# Patient Record
Sex: MALE | Race: WHITE | NOT HISPANIC OR LATINO | Employment: FULL TIME | URBAN - METROPOLITAN AREA
[De-identification: names, ages, dates, MRNs, and addresses within clinical notes are randomized per-mention and may not be internally consistent; named-entity substitution may affect disease eponyms.]

---

## 2019-02-05 ENCOUNTER — LAB VISIT (OUTPATIENT)
Dept: LAB | Facility: HOSPITAL | Age: 34
End: 2019-02-05
Attending: FAMILY MEDICINE
Payer: COMMERCIAL

## 2019-02-05 ENCOUNTER — OFFICE VISIT (OUTPATIENT)
Dept: INTERNAL MEDICINE | Facility: CLINIC | Age: 34
End: 2019-02-05
Payer: COMMERCIAL

## 2019-02-05 VITALS
SYSTOLIC BLOOD PRESSURE: 120 MMHG | WEIGHT: 156.31 LBS | HEIGHT: 69 IN | OXYGEN SATURATION: 98 % | BODY MASS INDEX: 23.15 KG/M2 | HEART RATE: 74 BPM | TEMPERATURE: 100 F | DIASTOLIC BLOOD PRESSURE: 76 MMHG

## 2019-02-05 DIAGNOSIS — Z13.6 SCREENING FOR CARDIOVASCULAR CONDITION: ICD-10-CM

## 2019-02-05 DIAGNOSIS — E55.9 VITAMIN D DEFICIENCY: ICD-10-CM

## 2019-02-05 DIAGNOSIS — J11.1 INFLUENZA: ICD-10-CM

## 2019-02-05 DIAGNOSIS — Z00.00 WELLNESS EXAMINATION: ICD-10-CM

## 2019-02-05 DIAGNOSIS — R50.9 FEVER, UNSPECIFIED FEVER CAUSE: ICD-10-CM

## 2019-02-05 DIAGNOSIS — Z00.00 WELLNESS EXAMINATION: Primary | ICD-10-CM

## 2019-02-05 DIAGNOSIS — R05.9 COUGH: ICD-10-CM

## 2019-02-05 DIAGNOSIS — Z82.49 FAMILY HISTORY OF PREMATURE CORONARY HEART DISEASE: ICD-10-CM

## 2019-02-05 LAB
25(OH)D3+25(OH)D2 SERPL-MCNC: 13 NG/ML
ANION GAP SERPL CALC-SCNC: 10 MMOL/L
BASOPHILS # BLD AUTO: 0.02 K/UL
BASOPHILS NFR BLD: 0.3 %
BUN SERPL-MCNC: 15 MG/DL
CALCIUM SERPL-MCNC: 9.8 MG/DL
CHLORIDE SERPL-SCNC: 102 MMOL/L
CHOLEST SERPL-MCNC: 211 MG/DL
CHOLEST/HDLC SERPL: 4.5 {RATIO}
CO2 SERPL-SCNC: 30 MMOL/L
CREAT SERPL-MCNC: 0.8 MG/DL
DIFFERENTIAL METHOD: ABNORMAL
EOSINOPHIL # BLD AUTO: 0.1 K/UL
EOSINOPHIL NFR BLD: 2.1 %
ERYTHROCYTE [DISTWIDTH] IN BLOOD BY AUTOMATED COUNT: 12.4 %
EST. GFR  (AFRICAN AMERICAN): >60 ML/MIN/1.73 M^2
EST. GFR  (NON AFRICAN AMERICAN): >60 ML/MIN/1.73 M^2
GLUCOSE SERPL-MCNC: 88 MG/DL
HCT VFR BLD AUTO: 46 %
HDLC SERPL-MCNC: 47 MG/DL
HDLC SERPL: 22.3 %
HGB BLD-MCNC: 15.2 G/DL
IMM GRANULOCYTES # BLD AUTO: 0.02 K/UL
IMM GRANULOCYTES NFR BLD AUTO: 0.3 %
INFLUENZA A, MOLECULAR: NEGATIVE
INFLUENZA B, MOLECULAR: NEGATIVE
LDLC SERPL CALC-MCNC: 98.6 MG/DL
LYMPHOCYTES # BLD AUTO: 1.7 K/UL
LYMPHOCYTES NFR BLD: 29.4 %
MCH RBC QN AUTO: 31.9 PG
MCHC RBC AUTO-ENTMCNC: 33 G/DL
MCV RBC AUTO: 97 FL
MONOCYTES # BLD AUTO: 0.6 K/UL
MONOCYTES NFR BLD: 10.5 %
NEUTROPHILS # BLD AUTO: 3.3 K/UL
NEUTROPHILS NFR BLD: 57.4 %
NONHDLC SERPL-MCNC: 164 MG/DL
NRBC BLD-RTO: 0 /100 WBC
PLATELET # BLD AUTO: 179 K/UL
PMV BLD AUTO: 11.7 FL
POTASSIUM SERPL-SCNC: 3.9 MMOL/L
RBC # BLD AUTO: 4.76 M/UL
SODIUM SERPL-SCNC: 142 MMOL/L
SPECIMEN SOURCE: NORMAL
TRIGL SERPL-MCNC: 327 MG/DL
TSH SERPL DL<=0.005 MIU/L-ACNC: 1.51 UIU/ML
WBC # BLD AUTO: 5.72 K/UL

## 2019-02-05 PROCEDURE — 3008F PR BODY MASS INDEX (BMI) DOCUMENTED: ICD-10-PCS | Mod: CPTII,S$GLB,, | Performed by: FAMILY MEDICINE

## 2019-02-05 PROCEDURE — 99999 PR PBB SHADOW E&M-NEW PATIENT-LVL IV: ICD-10-PCS | Mod: PBBFAC,,, | Performed by: FAMILY MEDICINE

## 2019-02-05 PROCEDURE — 99999 PR PBB SHADOW E&M-NEW PATIENT-LVL IV: CPT | Mod: PBBFAC,,, | Performed by: FAMILY MEDICINE

## 2019-02-05 PROCEDURE — 84443 ASSAY THYROID STIM HORMONE: CPT

## 2019-02-05 PROCEDURE — 99385 PREV VISIT NEW AGE 18-39: CPT | Mod: 25,1E,GY,S$GLB | Performed by: FAMILY MEDICINE

## 2019-02-05 PROCEDURE — 36415 COLL VENOUS BLD VENIPUNCTURE: CPT

## 2019-02-05 PROCEDURE — 80048 BASIC METABOLIC PNL TOTAL CA: CPT

## 2019-02-05 PROCEDURE — 87502 INFLUENZA DNA AMP PROBE: CPT

## 2019-02-05 PROCEDURE — 99203 PR OFFICE/OUTPT VISIT, NEW, LEVL III, 30-44 MIN: ICD-10-PCS | Mod: 25,S$GLB,, | Performed by: FAMILY MEDICINE

## 2019-02-05 PROCEDURE — 80061 LIPID PANEL: CPT

## 2019-02-05 PROCEDURE — 3008F BODY MASS INDEX DOCD: CPT | Mod: CPTII,S$GLB,, | Performed by: FAMILY MEDICINE

## 2019-02-05 PROCEDURE — 85025 COMPLETE CBC W/AUTO DIFF WBC: CPT

## 2019-02-05 PROCEDURE — 82306 VITAMIN D 25 HYDROXY: CPT

## 2019-02-05 PROCEDURE — 99203 OFFICE O/P NEW LOW 30 MIN: CPT | Mod: 25,S$GLB,, | Performed by: FAMILY MEDICINE

## 2019-02-05 PROCEDURE — 99385 PR PREVENTIVE VISIT,NEW,18-39: ICD-10-PCS | Mod: 25,1E,GY,S$GLB | Performed by: FAMILY MEDICINE

## 2019-02-05 RX ORDER — METHYLPREDNISOLONE 4 MG/1
TABLET ORAL
Qty: 1 PACKAGE | Refills: 0 | Status: SHIPPED | OUTPATIENT
Start: 2019-02-05 | End: 2019-02-05

## 2019-02-05 RX ORDER — METHYLPREDNISOLONE 4 MG/1
TABLET ORAL
Qty: 21 TABLET | Refills: 0 | Status: SHIPPED | OUTPATIENT
Start: 2019-02-05 | End: 2019-02-18 | Stop reason: ALTCHOICE

## 2019-02-05 RX ORDER — OSELTAMIVIR PHOSPHATE 75 MG/1
75 CAPSULE ORAL 2 TIMES DAILY
Qty: 10 CAPSULE | Refills: 0 | Status: SHIPPED | OUTPATIENT
Start: 2019-02-05 | End: 2019-02-10

## 2019-02-05 NOTE — LETTER
February 5,2019             [facility]  Internal Medicine  [address]  [place] [address]  Phone: [phone]  Fax: [phone]   February 5, 2019     Patient: Kaden Heller   YOB: 1985   Date of Visit: 2/5/2019       To Whom it May Concern:    Kaden Heller was seen in my clinic on 2/5/2019 and Dx with Influenza . He can return to work once fever free for 24 hrs .     If you have any questions or concerns, please don't hesitate to call.    Sincerely,         Dr. HARMAN Granda

## 2019-02-05 NOTE — PROGRESS NOTES
Subjective:       Patient ID: Kaden Heller is a 33 y.o. male.    Chief Complaint: Establish Care; Chest Congestion; Otalgia; and Sore Throat    34 yo male here to establish care. He has no major medical problems. Complains today of 1 day history of chest congestion, earache, and sore throat. Has tried OTC medications such as cough and cold remedies without much improvement. C/o low grade fever, cough and wheeze as most bothersome symptom. States symptoms started suddenly.    Also would like health maintenance visit; he has +FH of early MI/CAD in multiple family members. Requests referral to cardiology for eval.  He has surgical h/o appendectomy as well as repair of torn labrum of hip from track and field injury. No recent blood work; unsure of last tetanus but does not want booster; declines flu shot      does not have a problem list on file.  History reviewed. No pertinent past medical history.  Past Surgical History:   Procedure Laterality Date    APPENDECTOMY      HIP ARTHROSCOPY W/ LABRAL REPAIR       Family History   Problem Relation Age of Onset    Heart disease Mother     Heart attack Mother     Asthma Father     No Known Problems Sister     No Known Problems Daughter     No Known Problems Son     Heart attack Maternal Uncle     Heart disease Maternal Uncle     Heart attack Maternal Grandmother     Heart disease Maternal Grandmother     Heart attack Maternal Grandfather     Heart disease Maternal Grandfather      Social History     Socioeconomic History    Marital status: Single     Spouse name: Not on file    Number of children: Not on file    Years of education: Not on file    Highest education level: Not on file   Social Needs    Financial resource strain: Not on file    Food insecurity - worry: Not on file    Food insecurity - inability: Not on file    Transportation needs - medical: Not on file    Transportation needs - non-medical: Not on file   Occupational History    Not on  file   Tobacco Use    Smoking status: Never Smoker    Smokeless tobacco: Never Used   Substance and Sexual Activity    Alcohol use: Yes     Alcohol/week: 1.8 oz     Types: 1 Shots of liquor, 2 Cans of beer per week    Drug use: No    Sexual activity: No     Birth control/protection: None   Other Topics Concern    Not on file   Social History Narrative    Not on file     Review of Systems   Constitutional: Positive for activity change, appetite change, chills, diaphoresis, fatigue and fever.   HENT: Positive for congestion, postnasal drip, rhinorrhea, sneezing and sore throat.    Respiratory: Positive for cough and wheezing. Negative for shortness of breath and stridor.    Cardiovascular: Negative for chest pain and palpitations.   Gastrointestinal: Negative for diarrhea, nausea and vomiting.   Musculoskeletal: Positive for myalgias. Negative for arthralgias, back pain and gait problem.   Neurological: Negative for dizziness, light-headedness and headaches.   Psychiatric/Behavioral: Positive for sleep disturbance. Negative for dysphoric mood.   All other systems reviewed and are negative.      Objective:     Vitals:    02/05/19 1318   BP: 120/76   Pulse: 74   Temp: 99.5 °F (37.5 °C)        Physical Exam   Constitutional: He is oriented to person, place, and time. He appears well-developed and well-nourished. No distress.   HENT:   Head: Normocephalic and atraumatic.   Right Ear: Tympanic membrane normal.   Left Ear: Tympanic membrane normal.   Nose: Mucosal edema and rhinorrhea present. Right sinus exhibits maxillary sinus tenderness. Right sinus exhibits no frontal sinus tenderness. Left sinus exhibits maxillary sinus tenderness. Left sinus exhibits no frontal sinus tenderness.   Mouth/Throat: Posterior oropharyngeal erythema present. No oropharyngeal exudate, posterior oropharyngeal edema or tonsillar abscesses.   Eyes: EOM are normal. Pupils are equal, round, and reactive to light.   Neck: Normal range of  motion. Neck supple.   Cardiovascular: Normal rate, regular rhythm, normal heart sounds and intact distal pulses. Exam reveals no friction rub.   No murmur heard.  Pulmonary/Chest: Effort normal and breath sounds normal. No stridor. No respiratory distress. He has no wheezes. He has no rales.   Abdominal: Soft. Bowel sounds are normal. He exhibits no mass. There is no tenderness.   Musculoskeletal: Normal range of motion. He exhibits no edema, tenderness or deformity.   Neurological: He is alert and oriented to person, place, and time. No cranial nerve deficit or sensory deficit.   Skin: Skin is warm and dry. No rash noted. No pallor.   Psychiatric: He has a normal mood and affect. His behavior is normal.   Nursing note and vitals reviewed.      Assessment:       1. Wellness examination    2. Vitamin D deficiency    3. Screening for cardiovascular condition    4. Family history of premature coronary heart disease    5. Fever, unspecified fever cause    6. Cough    7. Influenza        Plan:           Problem List Items Addressed This Visit     None      Visit Diagnoses     Wellness examination    -  Primary    Relevant Orders    Basic metabolic panel (Completed)    CBC auto differential (Completed)    TSH (Completed)    Vitamin D deficiency        Relevant Orders    Vitamin D (Completed)    Screening for cardiovascular condition        Relevant Orders    Lipid panel (Completed)    Family history of premature coronary heart disease        Relevant Orders    Ambulatory Referral to Cardiology    Fever, unspecified fever cause        Relevant Orders    Influenza A & B by Molecular (Completed)    Cough        Relevant Orders    Influenza A & B by Molecular (Completed)    Influenza        Relevant Medications    oseltamivir (TAMIFLU) 75 MG capsule    methylPREDNISolone (MEDROL DOSEPACK) 4 mg tablet

## 2019-02-05 NOTE — PATIENT INSTRUCTIONS
The Flu (Influenza)     The virus that causes the flu spreads through the air in droplets when someone who has the flu coughs, sneezes, laughs, or talks.   The flu (influenza) is an infection that affects your respiratory tract. This tract is made up of your mouth, nose, and lungs, and the passages between them. Unlike a cold, the flu can make you very ill. And it can lead to pneumonia, a serious lung infection. The flu can have serious complications and even cause death.  Who is at risk for the flu?  Anyone can get the flu. But you are more likely to become infected if you:  · Have a weakened immune system  · Work in a healthcare setting where you may be exposed to flu germs  · Live or work with someone who has the flu  · Havent had an annual flu shot  How does the flu spread?  The flu is caused by a virus. The virus spreads through the air in droplets when someone who has the flu coughs, sneezes, laughs, or talks. You can become infected when you inhale these viruses directly. You can also become infected when you touch a surface on which the droplets have landed and then transfer the germs to your eyes, nose, or mouth. Touching used tissues, or sharing utensils, drinking glasses, or a toothbrush from an infected person can expose you to flu viruses, too.  What are the symptoms of the flu?  Flu symptoms tend to come on quickly and may last a few days to a few weeks. They include:  · Fever usually higher than 100.4°F  (38°C) and chills  · Sore throat and headache  · Dry cough  · Runny nose  · Tiredness and weakness  · Muscle aches  Who is at risk for flu complications?  For some people, the flu can be very serious. The risk for complications is greater for:  · Children younger than age 5  · Adults ages 65 and older  · People with a chronic illness such as diabetes or heart, kidney, or lung disease  · People who live in a nursing home or long-term care facility   How is the flu treated?  The flu usually gets  better after 7 days or so. In some cases, your healthcare provider may prescribe an antiviral medicine. This may help you get well a little sooner. For the medicine to help, you need to take it as soon as possible (ideally within 48 hours) after your symptoms start. If you develop pneumonia or other serious illness, you may need to stay in the hospital.  Easing flu symptoms  · Drink lots of fluids such as water, juice, and warm soup. A good rule is to drink enough so that you urinate your normal amount.  · Get plenty of rest.  · Ask your healthcare provider what to take for fever and pain.  · Call your provider if your fever is 100.4°F (38°C) or higher, or you become dizzy, lightheaded, or short of breath.  Taking steps to protect others  · Wash your hands often, especially after coughing or sneezing. Or clean your hands with an alcohol-based hand  containing at least 60% alcohol.  · Cough or sneeze into a tissue. Then throw the tissue away and wash your hands. If you dont have a tissue, cough and sneeze into your elbow.  · Stay home until at least 24 hours after you no longer have a fever or chills. Be sure the fever isnt being hidden by fever-reducing medicine.  · Dont share food, utensils, drinking glasses, or a toothbrush with others.  · Ask your healthcare provider if others in your household should get antiviral medicine to help them avoid infection.  How can the flu be prevented?  · One of the best ways to avoid the flu is to get a flu vaccine each year. The virus that causes the flu changes from year to year. For that reason, healthcare providers recommend getting the flu vaccine each year, as soon as it's available in your area. The vaccine is given as a shot. Your healthcare provider can tell you which vaccine is right for you. A nasal spray is also available but is not recommended for the 5887-8818 flu season. The CDC says this is because the nasal spray did not seem to protect against the flu  over the last several flu seasons. In the past, it was meant for people ages 2 to 49.  · Wash your hands often. Frequent handwashing is a proven way to help prevent infection.  · Carry an alcohol-based hand gel containing at least 60% alcohol. Use it when you can't use soap and water. Then wash your hands as soon as you can.  · Avoid touching your eyes, nose, and mouth.  · At home and work, clean phones, computer keyboards, and toys often with disinfectant wipes.  · If possible, avoid close contact with others who have the flu or symptoms of the flu.  Handwashing tips  Handwashing is one of the best ways to prevent many common infections. If you are caring for or visiting someone with the flu, wash your hands each time you enter and leave the room. Follow these steps:  · Use warm water and plenty of soap. Rub your hands together well.  · Clean the whole hand, including under your nails, between your fingers, and up the wrists.  · Wash for at least 15 seconds.  · Rinse, letting the water run down your fingers, not up your wrists.  · Dry your hands well. Use a paper towel to turn off the faucet and open the door.  Using alcohol-based hand   Alcohol-based hand  are also a good choice. Use them when you can't use soap and water. Follow these steps:  · Squeeze about a tablespoon of gel into the palm of one hand.  · Rub your hands together briskly, cleaning the backs of your hands, the palms, between your fingers, and up the wrists.  · Rub until the gel is gone and your hands are completely dry.  Preventing the flu in healthcare settings  The flu is a special concern for people in hospitals and long-term care facilities. To help prevent the spread of flu, many hospitals and nursing homes take these steps:  · Healthcare providers wash their hands or use an alcohol-based hand  before and after treating each patient.  · People with the flu have private rooms and bathrooms or share a room with someone  with the same infection.  · People who are at high risk for the flu but don't have it are encouraged to get the flu and pneumonia vaccines.  · All healthcare workers are encouraged or required to get flu shots.   Date Last Reviewed: 12/1/2016  © 1770-1858 [employer]. [address] [place] [address]. All rights reserved. This information is not intended as a substitute for professional medical care. Always follow your healthcare professional's instructions.

## 2019-02-06 DIAGNOSIS — Z82.49 FAMILY HISTORY OF EARLY CAD: Primary | ICD-10-CM

## 2019-02-18 ENCOUNTER — OFFICE VISIT (OUTPATIENT)
Dept: CARDIOLOGY | Facility: CLINIC | Age: 34
End: 2019-02-18
Payer: COMMERCIAL

## 2019-02-18 ENCOUNTER — CLINICAL SUPPORT (OUTPATIENT)
Dept: CARDIOLOGY | Facility: CLINIC | Age: 34
End: 2019-02-18
Payer: COMMERCIAL

## 2019-02-18 ENCOUNTER — CLINICAL SUPPORT (OUTPATIENT)
Dept: CARDIOLOGY | Facility: CLINIC | Age: 34
End: 2019-02-18
Attending: INTERNAL MEDICINE
Payer: COMMERCIAL

## 2019-02-18 VITALS
BODY MASS INDEX: 23.34 KG/M2 | SYSTOLIC BLOOD PRESSURE: 124 MMHG | DIASTOLIC BLOOD PRESSURE: 85 MMHG | HEIGHT: 68 IN | HEART RATE: 66 BPM | WEIGHT: 154 LBS

## 2019-02-18 DIAGNOSIS — Z82.49 FAMILY HISTORY OF CARDIOVASCULAR DISEASE: Primary | ICD-10-CM

## 2019-02-18 DIAGNOSIS — E78.1 HYPERTRIGLYCERIDEMIA: ICD-10-CM

## 2019-02-18 DIAGNOSIS — Z82.49 FAMILY HISTORY OF CARDIOVASCULAR DISEASE: ICD-10-CM

## 2019-02-18 DIAGNOSIS — Z82.49 FAMILY HISTORY OF EARLY CAD: ICD-10-CM

## 2019-02-18 DIAGNOSIS — R00.2 PALPITATIONS: ICD-10-CM

## 2019-02-18 DIAGNOSIS — E78.2 MIXED HYPERLIPIDEMIA: ICD-10-CM

## 2019-02-18 LAB
DIASTOLIC DYSFUNCTION: NO
ESTIMATED PA SYSTOLIC PRESSURE: 24.53
RETIRED EF AND QEF - SEE NOTES: 55 (ref 55–65)

## 2019-02-18 PROCEDURE — 99244 PR OFFICE CONSULTATION,LEVEL IV: ICD-10-PCS | Mod: S$GLB,,, | Performed by: INTERNAL MEDICINE

## 2019-02-18 PROCEDURE — 93306 2D ECHO WITH COLOR FLOW DOPPLER: ICD-10-PCS | Mod: S$GLB,,, | Performed by: NUCLEAR MEDICINE

## 2019-02-18 PROCEDURE — 93306 TTE W/DOPPLER COMPLETE: CPT | Mod: S$GLB,,, | Performed by: NUCLEAR MEDICINE

## 2019-02-18 PROCEDURE — 93000 EKG 12-LEAD: ICD-10-PCS | Mod: S$GLB,,, | Performed by: NUCLEAR MEDICINE

## 2019-02-18 PROCEDURE — 99999 PR PBB SHADOW E&M-EST. PATIENT-LVL III: CPT | Mod: PBBFAC,,, | Performed by: INTERNAL MEDICINE

## 2019-02-18 PROCEDURE — 99999 PR PBB SHADOW E&M-EST. PATIENT-LVL III: ICD-10-PCS | Mod: PBBFAC,,, | Performed by: INTERNAL MEDICINE

## 2019-02-18 PROCEDURE — 93000 ELECTROCARDIOGRAM COMPLETE: CPT | Mod: S$GLB,,, | Performed by: NUCLEAR MEDICINE

## 2019-02-18 PROCEDURE — 99244 OFF/OP CNSLTJ NEW/EST MOD 40: CPT | Mod: S$GLB,,, | Performed by: INTERNAL MEDICINE

## 2019-02-18 NOTE — LETTER
February 18, 2019      Mora Granda MD  [address] [facility] [address]           [facility]  [address] [address]  Phone: [phone]  Fax: [phone]          Patient: Kaden Heller   MR Number: [id number]   YOB: 1985   Date of Visit: 2/18/2019       Dear Dr. Mora Granda:    Thank you for referring Kaden Heller to me for evaluation. Attached you will find relevant portions of my assessment and plan of care.    If you have questions, please do not hesitate to call me. I look forward to following Kaden Heller along with you.    Sincerely,    Kj Cerda MD    Enclosure  CC:  No Recipients    If you would like to receive this communication electronically, please contact [email or web address] or [phone] to request more information on [employer] Link access.    For providers and/or their staff who would like to refer a patient to [facility], please contact us through our one-stop-shop provider referral line, [facility] [place], [facility], at [phone].    If you feel you have received this communication in error or would no longer like to receive these types of communications, please e-mail [email or web address]

## 2019-02-18 NOTE — PROGRESS NOTES
Subjective:   Patient ID:  Kaden Heller is a 33 y.o. male who presents for cardiac consult of [facility]      HPI  The patient came in today for cardiac consult of \[facility]\"" Care    Referring Physician: Mora Granda MD   Reason for consult: FH CVD    2/18/19  Kaden Heller is a 33 y.o. male pt without significant PMHx presents for initial CV evaluation due to strong FH of early CVD.     He had a recent appt with Dr. Granda and discussed significant CV disease. He had recent labwork with elevated cholesterol, TG - 327.   Ran [place] marathon recently. Saw cardiologist in past in , had occasional irregular heart beating at night when he rolls over - 7 or 8 years ago.   Had Holter which was negative. Still feels a flutter once or twice. Occ snoring.  Had syncopal episodes last year due to running, improved with hydration.     Patient feels no chest pain, no sob, no leg swelling, no PND, no dizziness,  no CNS symptoms.    Patient has fairly good exercise tolerance. 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     Patient is compliant with medications.    FH - Mother - MI at age 36, had 2, second at 41 - smoker; her mother - MI at 34, father - 52; maternal uncle - 14 MI's    ECG - NSR with sinus arrythmia    Past Medical History:   Diagnosis Date    Mixed hyperlipidemia 2/18/2019       Past Surgical History:   Procedure Laterality Date    APPENDECTOMY      HIP ARTHROSCOPY W/ LABRAL REPAIR         Social History     Tobacco Use    Smoking status: Never Smoker    Smokeless tobacco: Never Used   Substance Use Topics    Alcohol use: Yes     Alcohol/week: 1.8 oz     Types: 2 Cans of beer, 1 Shots of liquor per week    Drug use: No       Family History   Problem Relation Age of Onset    Heart disease Mother     Heart attack Mother     Asthma Father     Anemia Sister     No Known Problems Daughter     No Known Problems Son     Heart attack Maternal Uncle     Heart disease Maternal Uncle     Heart  "attack Maternal Grandmother     Heart disease Maternal Grandmother     Heart attack Maternal Grandfather     Heart disease Maternal Grandfather        Patient's Medications   New Prescriptions    No medications on file   Previous Medications    No medications on file   Modified Medications    No medications on file   Discontinued Medications    METHYLPREDNISOLONE (MEDROL DOSEPACK) 4 MG TABLET    use as directed       Review of Systems   Constitutional: Negative.    HENT: Negative.    Eyes: Negative.    Respiratory: Negative.  Negative for shortness of breath.    Cardiovascular: Positive for palpitations. Negative for chest pain and leg swelling.   Gastrointestinal: Negative.    Genitourinary: Negative.    Musculoskeletal: Negative.    Skin: Negative.    Neurological: Negative.    Endo/Heme/Allergies: Negative.    Psychiatric/Behavioral: Negative.    All 12 systems otherwise negative.      Wt Readings from Last 3 Encounters:   02/18/19 69.9 kg (154 lb)   02/05/19 70.9 kg (156 lb 4.9 oz)     Temp Readings from Last 3 Encounters:   02/05/19 99.5 °F (37.5 °C) (Oral)     BP Readings from Last 3 Encounters:   02/18/19 124/85   02/05/19 120/76     Pulse Readings from Last 3 Encounters:   02/18/19 66   02/05/19 74       /85 (BP Method: Small (Manual))   Pulse 66   Ht 5' 8" (1.727 m)   Wt 69.9 kg (154 lb)   BMI 23.42 kg/m²     Objective:   Physical Exam   Constitutional: He is oriented to person, place, and time. He appears well-developed and well-nourished. No distress.   HENT:   Head: Normocephalic and atraumatic.   Nose: Nose normal.   Mouth/Throat: Oropharynx is clear and moist.   Eyes: Conjunctivae and EOM are normal. No scleral icterus.   Neck: Normal range of motion. Neck supple. No JVD present. No thyromegaly present.   Cardiovascular: Normal rate, regular rhythm, S1 normal and S2 normal. Exam reveals no gallop, no S3, no S4 and no friction rub.   No murmur heard.  Pulmonary/Chest: Effort normal and " breath sounds normal. No stridor. No respiratory distress. He has no wheezes. He has no rales. He exhibits no tenderness.   Abdominal: Soft. Bowel sounds are normal. He exhibits no distension and no mass. There is no tenderness. There is no rebound.   Genitourinary:   Genitourinary Comments: Deferred   Musculoskeletal: Normal range of motion. He exhibits no edema, tenderness or deformity.   Lymphadenopathy:     He has no cervical adenopathy.   Neurological: He is alert and oriented to person, place, and time. He exhibits normal muscle tone. Coordination normal.   Skin: Skin is warm and dry. No rash noted. He is not diaphoretic. No erythema. No pallor.   Psychiatric: He has a normal mood and affect. His behavior is normal. Judgment and thought content normal.   Nursing note and vitals reviewed.      Lab Results   Component Value Date     02/05/2019    K 3.9 02/05/2019     02/05/2019    CO2 30 (H) 02/05/2019    BUN 15 02/05/2019    CREATININE 0.8 02/05/2019    GLU 88 02/05/2019    WBC 5.72 02/05/2019    HGB 15.2 02/05/2019    HCT 46.0 02/05/2019    MCV 97 02/05/2019     02/05/2019    TSH 1.507 02/05/2019    CHOL 211 (H) 02/05/2019    HDL 47 02/05/2019    LDLCALC 98.6 02/05/2019    TRIG 327 (H) 02/05/2019     Assessment:      1. Family history of cardiovascular disease    2. Palpitations    3. Hypertriglyceridemia    4. Mixed hyperlipidemia        Plan:   1. FH CVD  - check echo  - stress if abnormal echo  - overall very active    2. Palpitations  - ECHO eval valves/PFO    3. HLD with  HyperTG  - Mediterranean diet  - if remains elevated discussed starting meds    Thank you for allowing me to participate in this patient's care. Please do not hesitate to contact me with any questions or concerns. Consult note has been forwarded to the referral physician.

## 2019-02-22 ENCOUNTER — PATIENT MESSAGE (OUTPATIENT)
Dept: INTERNAL MEDICINE | Facility: CLINIC | Age: 34
End: 2019-02-22

## 2019-03-05 ENCOUNTER — PATIENT MESSAGE (OUTPATIENT)
Dept: INTERNAL MEDICINE | Facility: CLINIC | Age: 34
End: 2019-03-05

## 2019-03-06 ENCOUNTER — CLINICAL SUPPORT (OUTPATIENT)
Dept: AUDIOLOGY | Facility: CLINIC | Age: 34
End: 2019-03-06
Payer: COMMERCIAL

## 2019-03-06 ENCOUNTER — OFFICE VISIT (OUTPATIENT)
Dept: OTOLARYNGOLOGY | Facility: CLINIC | Age: 34
End: 2019-03-06
Payer: COMMERCIAL

## 2019-03-06 VITALS
DIASTOLIC BLOOD PRESSURE: 98 MMHG | BODY MASS INDEX: 23.57 KG/M2 | TEMPERATURE: 98 F | HEART RATE: 63 BPM | SYSTOLIC BLOOD PRESSURE: 142 MMHG | WEIGHT: 155 LBS

## 2019-03-06 DIAGNOSIS — H83.09 LABYRINTHITIS, UNSPECIFIED LATERALITY: ICD-10-CM

## 2019-03-06 DIAGNOSIS — H93.13 TINNITUS OF BOTH EARS: ICD-10-CM

## 2019-03-06 DIAGNOSIS — R42 DIZZINESS: ICD-10-CM

## 2019-03-06 DIAGNOSIS — H91.21 SUDDEN IDIOPATHIC HEARING LOSS OF RIGHT EAR WITH UNRESTRICTED HEARING OF LEFT EAR: Primary | ICD-10-CM

## 2019-03-06 PROCEDURE — 99204 OFFICE O/P NEW MOD 45 MIN: CPT | Mod: S$GLB,,, | Performed by: PHYSICIAN ASSISTANT

## 2019-03-06 PROCEDURE — 92567 PR TYMPA2METRY: ICD-10-PCS | Mod: S$GLB,,, | Performed by: AUDIOLOGIST-HEARING AID FITTER

## 2019-03-06 PROCEDURE — 92567 TYMPANOMETRY: CPT | Mod: S$GLB,,, | Performed by: AUDIOLOGIST-HEARING AID FITTER

## 2019-03-06 PROCEDURE — 3008F BODY MASS INDEX DOCD: CPT | Mod: CPTII,S$GLB,, | Performed by: PHYSICIAN ASSISTANT

## 2019-03-06 PROCEDURE — 99999 PR PBB SHADOW E&M-EST. PATIENT-LVL III: CPT | Mod: PBBFAC,,, | Performed by: PHYSICIAN ASSISTANT

## 2019-03-06 PROCEDURE — 99999 PR PBB SHADOW E&M-EST. PATIENT-LVL I: CPT | Mod: PBBFAC,,, | Performed by: AUDIOLOGIST-HEARING AID FITTER

## 2019-03-06 PROCEDURE — 92557 COMPREHENSIVE HEARING TEST: CPT | Mod: S$GLB,,, | Performed by: AUDIOLOGIST-HEARING AID FITTER

## 2019-03-06 PROCEDURE — 92557 PR COMPREHENSIVE HEARING TEST: ICD-10-PCS | Mod: S$GLB,,, | Performed by: AUDIOLOGIST-HEARING AID FITTER

## 2019-03-06 PROCEDURE — 99999 PR PBB SHADOW E&M-EST. PATIENT-LVL III: ICD-10-PCS | Mod: PBBFAC,,, | Performed by: PHYSICIAN ASSISTANT

## 2019-03-06 PROCEDURE — 99999 PR PBB SHADOW E&M-EST. PATIENT-LVL I: ICD-10-PCS | Mod: PBBFAC,,, | Performed by: AUDIOLOGIST-HEARING AID FITTER

## 2019-03-06 PROCEDURE — 99204 PR OFFICE/OUTPT VISIT, NEW, LEVL IV, 45-59 MIN: ICD-10-PCS | Mod: S$GLB,,, | Performed by: PHYSICIAN ASSISTANT

## 2019-03-06 PROCEDURE — 3008F PR BODY MASS INDEX (BMI) DOCUMENTED: ICD-10-PCS | Mod: CPTII,S$GLB,, | Performed by: PHYSICIAN ASSISTANT

## 2019-03-06 RX ORDER — PREDNISONE 20 MG/1
TABLET ORAL
Qty: 32 TABLET | Refills: 0 | Status: SHIPPED | OUTPATIENT
Start: 2019-03-06 | End: 2019-04-03

## 2019-03-06 NOTE — PROGRESS NOTES
Subjective:       Patient ID: Kaden Heller is a 33 y.o. male.    Chief Complaint: Other (dizziness, ringing in the right ear along with pressure, lots of pressure in the left ear with ringing.)    Patient is a very pleasant 33 year old male here to see me today for the first time for evaluation of tinnitus and aural fullness in his RIGHT ear for past 2-3 weeks; gradually worsening.  He denies hearing loss.  This morning he started with symptoms (pressure then tinnitus) in his LEFT ear too and then had severe dizziness which he describes as unsteadiness with nausea.  He gets relief with focusing on something and holding very still.  He initially denied hearing loss but later reported possible fluctuating hearing loss AD over past year.  He has not had any recent issues with ear pain (just pressure) or ear drainage.  He denies a family history of hearing loss, and has not had any previous otologic surgery.  He denies any history of significant loud noise exposure. He had issues with dizziness today for the first time.  He was sick with influenza earlier this month.  He does not use any nasal sprays.  Denies fever or headaches.        Review of Systems   Constitutional: Negative for activity change, appetite change, fatigue and fever.   HENT: Positive for hearing loss (AD) and tinnitus (severe AD ). Negative for congestion, ear discharge, ear pain (pressure), nosebleeds, postnasal drip, rhinorrhea, sinus pressure and sore throat.    Eyes: Positive for visual disturbance (hard to focus when dizzy). Negative for photophobia and discharge.   Respiratory: Negative for cough and shortness of breath.    Cardiovascular: Negative for chest pain and palpitations.   Gastrointestinal: Positive for nausea. Negative for diarrhea and vomiting.   Musculoskeletal: Negative for gait problem.   Allergic/Immunologic: Positive for food allergies.   Neurological: Positive for dizziness. Negative for light-headedness and headaches.    Hematological: Negative for adenopathy.   Psychiatric/Behavioral: Negative for confusion.       Objective:      Physical Exam   Constitutional: He is oriented to person, place, and time. Vital signs are normal. He appears well-developed and well-nourished. He is cooperative. He appears ill.   HENT:   Head: Normocephalic and atraumatic.   Right Ear: Tympanic membrane, external ear and ear canal normal. No middle ear effusion.   Left Ear: External ear and ear canal normal. Tympanic membrane is injected.  No middle ear effusion.   Nose: No mucosal edema, rhinorrhea, nasal deformity or septal deviation.   Mouth/Throat: Uvula is midline, oropharynx is clear and moist and mucous membranes are normal. No trismus in the jaw. Normal dentition. No uvula swelling. No oropharyngeal exudate or posterior oropharyngeal edema.   Eyes: Conjunctivae and EOM are normal. Pupils are equal, round, and reactive to light. Right eye exhibits no chemosis. Left eye exhibits no chemosis. Right conjunctiva is not injected. Left conjunctiva is not injected. No scleral icterus.   Neck: Trachea normal and phonation normal. No tracheal tenderness present. No tracheal deviation present. No thyroid mass and no thyromegaly present.   Cardiovascular: Intact distal pulses.   Pulmonary/Chest: Effort normal. No accessory muscle usage or stridor. No respiratory distress.   Lymphadenopathy:        Head (right side): No submental, no submandibular, no preauricular and no posterior auricular adenopathy present.        Head (left side): No submental, no submandibular, no preauricular and no posterior auricular adenopathy present.     He has no cervical adenopathy.        Right cervical: No superficial cervical and no deep cervical adenopathy present.       Left cervical: No superficial cervical and no deep cervical adenopathy present.   Neurological: He is alert and oriented to person, place, and time. No cranial nerve deficit.   Skin: Skin is warm and dry.  No rash noted. No erythema.   Psychiatric: He has a normal mood and affect. His behavior is normal. Thought content normal.         AUDIOGRAM:      Results reveal a mild rising to normal sensorineural hearing loss 250-8000 Hz for the right ear, and normal hearing from 250-8000 Hz for the left ear.   Speech Reception Thresholds were  25 dBHL for the right ear and 10 dBHL for the left ear.   Word recognition scores were excellent for the right ear and excellent for the left ear.   Tympanograms were Type A, normal for the right ear and Type A, normal for the left ear.     Tympanometry  Left: 0.6ml@-28daPa  ECV: 1.4ml     Right: 1.2ml@-46daPa  ECV: 1.4ml     [place] Hallpike Right: Negative for BPPV  [place] Hallpike Left: Nystagmus that had slight torsion noted. Nystagmus was a combination of vertical and horizontal movements. DHP repeated and nystagmus slowed significantly. Epley maneuver was competed due to slight torsion; however, it is unclear if non-classic BPPV is present.        A 5-position Epley maneuver was performed for the left.  Nystagmus took a amount of significant time to fatigue in each position. Patient tolerated the maneuver well and was asymptomatic upon discharge.  Post-Epley home instructions were reviewed and given to the patient.  Understanding was voiced.    Assessment:       1. Sudden idiopathic hearing loss of right ear with unrestricted hearing of left ear    2. Tinnitus of both ears    3. Dizziness    4. Labyrinthitis, unspecified laterality        Plan:         Discussed with patient that I suspect a labyrinthitis versus initial Meniere's presentation.  We discussed the relevant pathology and anatomy of sudden sensorineural hearing loss (SSNHL) and labyrinthitis, and that it is usually a viral sequelae.  The current treatment recommendation is prompt treatment with high dose oral steroids, Prednisone 60-40-20-10 over two weeks.  Risks of steroid use, including elevation of blood sugar, were  discussed with the patient who expressed understanding.  I will see the patient back in two weeks with a repeat audiogram.  If there has not been any improvement in his audiogram, then certainly transtympanic steroid injections could be considered.   In addition, due to the large asymmetry of his hearing loss, with a 20 dB difference across several frequencies, we will also order an MRI of the IAC with contrast to evaluate for retrocochlear lesions.    We discussed the relevant pathology of hydrops, and that it is caused by excess endolymphatic fluid in the inner ear.  Hydrops was previously classified as Meniere's disease in which the classic constellation of symptoms included dizziness, fullness, tinnitus, and fluctuating hearing loss.  However, as more research is being done, it is now accepted that a patient with hydrops can have one, some, or all of those symptoms.  I would recommend EcoG at time of VNG and if testing supports hydrops, the first step would be starting on the hydrops diet, which is a low sodium diet.     I had a long discussion with the patient regarding their symptoms.  Dizziness, vertigo and disequilibrium are common symptoms reported by adults during visits to their doctors. They are all symptoms that can result from a peripheral vestibular disorder (a dysfunction of the balance organs of the inner ear) or central vestibular disorder (a dysfunction of one or more parts of the central nervous system that help process balance and spatial information).  There are also non-vestibular causes of dizziness, and dizziness can be linked to a wide array of problems such as blood-flow irregularities from cardiovascular problems and blood pressure fluctuations.  I would recommend a VNG for further diagnostic testing, and will contact the patient with further recommendations when that is complete.  He may benefit from Valium and Meclizine for symptomatic relief once VNG is completed.

## 2019-03-06 NOTE — PROGRESS NOTES
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  Patient complains of tinnitus and aural fullness in his RIGHT ear for past 2-3 weeks; gradually worsening.  He was told previously it was an ear infection. He denies hearing loss.  This morning he started with symptoms (pressure then tinnitus) in his LEFT ear too and then had severe dizziness which he describes as unsteadiness and slight spinning with nausea.  He has had double vision. He gets relief with focusing on something and holding very still.  He initially denied hearing loss but later reported possible fluctuating hearing loss AD over past year.  He has not had any recent issues with ear pain (just pressure) or ear drainage.  He denies a family history of hearing loss, and has not had any previous otologic surgery.  He denies any history of significant loud noise exposure. He had issues with dizziness today for the first time.  He was sick with influenza earlier this month.  He does not use any nasal sprays.  Denies fever or headaches.    Results reveal a mild rising to normal sensorineural hearing loss 250-8000 Hz for the right ear, and normal hearing from 250-8000 Hz for the left ear.   Speech Reception Thresholds were  25 dBHL for the right ear and 10 dBHL for the left ear.   Word recognition scores were excellent for the right ear and excellent for the left ear.   Tympanograms were Type A, normal for the right ear and Type A, normal for the left ear.    Tympanometry  Left: 0.6ml@-28daPa  ECV: 1.4ml    Right: 1.2ml@-46daPa  ECV: 1.4ml    [place] Hallpike Right: Negative for BPPV  [place] Hallpike Left: Nystagmus that had slight torsion noted. Nystagmus was a combination of vertical and horizontal movements. DHP repeated and nystagmus slowed significantly. Epley maneuver was competed due to slight torsion; however, it is unclear if non-classic BPPV is present.       A 5-position Epley maneuver was performed for the  left.  Nystagmus took a amount of significant time to fatigue in each position. Patient tolerated the maneuver well and was asymptomatic upon discharge.  Post-Epley home instructions were reviewed and given to the patient.  Understanding was voiced.    Patient was counseled on the above findings.    Recommendations:  1. Diagnostic VNG and Ecog  2. Audiograms as needed.

## 2019-03-07 ENCOUNTER — TELEPHONE (OUTPATIENT)
Dept: OTOLARYNGOLOGY | Facility: CLINIC | Age: 34
End: 2019-03-07

## 2019-03-07 ENCOUNTER — CLINICAL SUPPORT (OUTPATIENT)
Dept: AUDIOLOGY | Facility: CLINIC | Age: 34
End: 2019-03-07
Payer: COMMERCIAL

## 2019-03-07 DIAGNOSIS — H81.01 MENIERE'S DISEASE (COCHLEAR HYDROPS), RIGHT: Primary | ICD-10-CM

## 2019-03-07 PROCEDURE — 92584 PR ELECTROCOCHLEOGRAPHY: ICD-10-PCS | Mod: S$GLB,,, | Performed by: AUDIOLOGIST-HEARING AID FITTER

## 2019-03-07 PROCEDURE — 92537 CALORIC VSTBLR TEST W/REC: CPT | Mod: S$GLB,,, | Performed by: AUDIOLOGIST-HEARING AID FITTER

## 2019-03-07 PROCEDURE — 92537 PR CALORIC VSTBLR TEST W/REC BITHERMAL: ICD-10-PCS | Mod: S$GLB,,, | Performed by: AUDIOLOGIST-HEARING AID FITTER

## 2019-03-07 PROCEDURE — 92553 PR AUDIOMETRY, AIR & BONE: ICD-10-PCS | Mod: S$GLB,,, | Performed by: AUDIOLOGIST-HEARING AID FITTER

## 2019-03-07 PROCEDURE — 92584 ELECTROCOCHLEOGRAPHY: CPT | Mod: S$GLB,,, | Performed by: AUDIOLOGIST-HEARING AID FITTER

## 2019-03-07 PROCEDURE — 92553 AUDIOMETRY AIR & BONE: CPT | Mod: S$GLB,,, | Performed by: AUDIOLOGIST-HEARING AID FITTER

## 2019-03-07 PROCEDURE — 92540 PR VESTIBULAR EVAL NYSTAG FOVL&PERPH STIM OSCIL TRACKING: ICD-10-PCS | Mod: S$GLB,,, | Performed by: AUDIOLOGIST-HEARING AID FITTER

## 2019-03-07 PROCEDURE — 92540 BASIC VESTIBULAR EVALUATION: CPT | Mod: S$GLB,,, | Performed by: AUDIOLOGIST-HEARING AID FITTER

## 2019-03-07 NOTE — PROGRESS NOTES
Referring provider: Janet Bailey PA-C    Kaden Heller was seen 03/07/2019 for an vestibular evaluation.  Patient was seen yesterday for tinnitus, aural fullness, hearing loss and vertigo.  He has noted pressure, fullness and decreased hearing in his right ear over the past year but yesterday had sudden onset of fullness and tinnitus in the left ear with vertigo.  He has not appreciated fluctuations in his hearing.  There is suspicion of ELH/MD vs labyrinthitis.  Patient was prescribed oral steroid therapy for possible sudden hearing loss AD.  He was also diagnosed yesterday with BPPV AS and Epley was maneuver completed in office.   He reports a few hours later his symptoms improving and by 6 pm last night, the aural fullness AU, tinnitus AU and dizziness resolved.  Patient ran a marathon a month ago and was following healthy diet, reduced sodium intake as part of his training.  Since marathon ended, he significantly reduced his aerobic activity and running, and has not been as strict with his diet.  He notes few days prior to onset of yesterday's, he was eating pre-seasoned chicken that had more salt than his usual serving.  He also took increased ibuprofen yesterday secondary to muscle pain. No history of migraine.     Audiology Report:  Results reveal normal audiometric thresholds 125-8000 Hz for the right ear and for the left ear.  Low-frequency SNHL right ear is no longer present; resolved vs fluctuations?    Electrocochleography (ECoghG):  ECochG test results were obtained utilizing insert TIPTrodes with a click stimulus rate of 9.1-11.1/sec presented at 85 dBnHL.  The average SP/AP ratio was .25 for the left ear.  The average SP/AP ratio was .42 for the right ear.    Summary: Utilizing an SP/AP criteria of .40 or greater as abnormal this finding is abnormal for the right ear, suggestive of Endolymphatic Hydrops (ELH) or Meniere's Disease (MD).      Videonystagmography Report (VNG):  Oculomotor function  tests (sinusoidal tracking, saccade, optokinetic) were normal and symmetric.  Spontaneous test was absent for nystagmus.  Gaze test was absent for nystagmus.  Head-shake test was absent for after head-shake nystagmus.  Static Positional test revealed <clinically insignificant> left-beating nystagmus:   Head Center: 3 d/s LB   Head Right: 2 d/s LB   Head Left: Absent  [place]-Hallpike Right was negative for BPPV.  [place]-Hallpike Left was negative for BPPV. There was <clinically insignificant> 2 d/s LB nystagmus.   Bi-thermal caloric test was Normal.  Fixation suppression following caloric irrigations was normal.  The following values were obtained:  Unilateral weakness (UW): 13% right ear  Directional preponderance (DP): 18% left beating  RC: 23 d/s (tested X2)   d/s  RW: 13 d/s (tested X2)  LW: 26 d/s    Summary: Normal VNG.  Interestingly, there is a right ear caloric weakness to warm air stimulations, most likely consistent with right ear ELH/MD.     Recommendations:  1. ENT  2. Patient was provided [place]'s diet handouts and we discussed in detail.  3. Patient was provided Left Epley handout in the event BPPV returns while he is on vacation next week. I also explained Right Epley maneuver; explained videos are available on YouTube.     Patient was counseled on the above findings.  Tracings are to be scanned.

## 2019-03-07 NOTE — TELEPHONE ENCOUNTER
Called and reviewed results of audio, VNG and EcoG with patient.  Supports hydrops; discussed hydrops diet.  RTC as needed

## 2019-03-11 ENCOUNTER — PATIENT MESSAGE (OUTPATIENT)
Dept: OTOLARYNGOLOGY | Facility: CLINIC | Age: 34
End: 2019-03-11

## 2019-03-11 ENCOUNTER — TELEPHONE (OUTPATIENT)
Dept: INTERNAL MEDICINE | Facility: CLINIC | Age: 34
End: 2019-03-11

## 2019-03-11 DIAGNOSIS — H93.19 TINNITUS, UNSPECIFIED LATERALITY: Primary | ICD-10-CM

## 2019-03-26 ENCOUNTER — TELEPHONE (OUTPATIENT)
Dept: RADIOLOGY | Facility: HOSPITAL | Age: 34
End: 2019-03-26

## 2019-03-27 ENCOUNTER — TELEPHONE (OUTPATIENT)
Dept: OTOLARYNGOLOGY | Facility: CLINIC | Age: 34
End: 2019-03-27

## 2019-03-27 NOTE — TELEPHONE ENCOUNTER
Spoke with patient.  He notes feeling much better and does not wish to do MRI at this time.  His asymmetrical hearing loss resolved on repeat audiogram.  I recommend he call us with any issues (worsening tinnitus, hearing loss or dizziness).  Annual audiograms and hydrops diet.

## 2019-03-29 ENCOUNTER — PATIENT MESSAGE (OUTPATIENT)
Dept: OTOLARYNGOLOGY | Facility: CLINIC | Age: 34
End: 2019-03-29

## 2019-04-03 ENCOUNTER — OFFICE VISIT (OUTPATIENT)
Dept: OTOLARYNGOLOGY | Facility: CLINIC | Age: 34
End: 2019-04-03
Payer: COMMERCIAL

## 2019-04-03 VITALS
SYSTOLIC BLOOD PRESSURE: 140 MMHG | DIASTOLIC BLOOD PRESSURE: 85 MMHG | BODY MASS INDEX: 24.87 KG/M2 | TEMPERATURE: 99 F | WEIGHT: 163.56 LBS | HEART RATE: 63 BPM

## 2019-04-03 DIAGNOSIS — H93.11 TINNITUS OF RIGHT EAR: ICD-10-CM

## 2019-04-03 DIAGNOSIS — H81.01 MENIERE DISEASE, RIGHT: Primary | ICD-10-CM

## 2019-04-03 PROCEDURE — 99214 PR OFFICE/OUTPT VISIT, EST, LEVL IV, 30-39 MIN: ICD-10-PCS | Mod: S$GLB,,, | Performed by: PHYSICIAN ASSISTANT

## 2019-04-03 PROCEDURE — 99999 PR PBB SHADOW E&M-EST. PATIENT-LVL III: CPT | Mod: PBBFAC,,, | Performed by: PHYSICIAN ASSISTANT

## 2019-04-03 PROCEDURE — 99999 PR PBB SHADOW E&M-EST. PATIENT-LVL III: ICD-10-PCS | Mod: PBBFAC,,, | Performed by: PHYSICIAN ASSISTANT

## 2019-04-03 PROCEDURE — 3008F BODY MASS INDEX DOCD: CPT | Mod: CPTII,S$GLB,, | Performed by: PHYSICIAN ASSISTANT

## 2019-04-03 PROCEDURE — 3008F PR BODY MASS INDEX (BMI) DOCUMENTED: ICD-10-PCS | Mod: CPTII,S$GLB,, | Performed by: PHYSICIAN ASSISTANT

## 2019-04-03 PROCEDURE — 99214 OFFICE O/P EST MOD 30 MIN: CPT | Mod: S$GLB,,, | Performed by: PHYSICIAN ASSISTANT

## 2019-04-03 NOTE — PROGRESS NOTES
"Subjective:       Patient ID: Kaden Heller is a 34 y.o. male.    Chief Complaint: Tinnitus and Other (ear fullness R > L)    Patient is a very pleasant 34 year old male here to see me today for aural fullness AD > AS and tinnitus AD.  He was here last month with suspected Meniere's on the RIGHT.  He had abnormal EcoG and caloric weakness on the right at that time.  He also had low-frequency HL on the RIGHT that resolved the next day.  I spoke with him last week (3/27) about scheduling MRI IAC and he declined at that time because his symptoms had resolved.  He says 2 days later he again had RIGHT ear pressure and ringing.  He's also had sensation of warmth around the external ear (AD) for past 2 days and says his eyes feel "sleepy" but denies diplopia, denies blurred vision, denies dizziness at this time.  Denies runny nose, congestion or sinus pain/pressure.  Denies nausea or vomiting.  He says loud noise or bright lights seem to make his RIGHT ear pressure worse.  He's trying to watch sodium intake since his last visit.  Drinks 2 cups of coffee in AM.    He was first here with me on 3/6/19 with pressure, fullness and decreased hearing in his right ear over the past year but had sudden onset of fullness and tinnitus in the left ear with severe vertigo starting the day prior.  He did not appreciate fluctuations in his hearing.  There was suspicion of ELH/MD vs labyrinthitis.  Patient was prescribed oral steroid therapy for possible sudden hearing loss AD (3/6/19) but today admits that he stopped it after 3 days because his symptoms resolved.  He also had nystagmus on [place]-Hallpike and had LEFT Epley done at that visit by audiologist.  The next day he came in for VNG and EcoG and reported that within a few hours of Epley maneuver the day prior, his symptoms improved and then the aural fullness AU, tinnitus AU and dizziness all resolved that same evening.  He ran a marathon a month prior to symptom onset and was " following healthy diet, reduced sodium intake as part of his training.  Since marathon ended, he has significantly reduced his aerobic activity and running, and has not been as strict with his diet; admits more salt intake than usual.  He also reported taking an increased dose of ibuprofen on day of initial visit.  No history of migraine.       Review of Systems   Constitutional: Negative for fever.   HENT: Positive for ear pain (pressure AD > AS) and tinnitus (AD). Negative for ear discharge, hearing loss, postnasal drip, rhinorrhea, sinus pressure and sinus pain.    Eyes: Positive for photophobia (makes his pressure AD worse). Negative for visual disturbance.   Gastrointestinal: Negative for nausea and vomiting.   Neurological: Negative for dizziness and light-headedness.       Objective:      Physical Exam   Constitutional: He is oriented to person, place, and time. Vital signs are normal. He appears well-developed and well-nourished. He is cooperative. He does not have a sickly appearance. He does not appear ill.   HENT:   Head: Normocephalic and atraumatic.   Right Ear: External ear and ear canal normal. Tympanic membrane is injected (superior posterior aspect). Tympanic membrane is not erythematous. No middle ear effusion.   Left Ear: External ear and ear canal normal. Tympanic membrane is not injected and not erythematous.  No middle ear effusion.   Nose: No mucosal edema, rhinorrhea, nasal deformity or septal deviation. Right sinus exhibits no maxillary sinus tenderness and no frontal sinus tenderness. Left sinus exhibits no maxillary sinus tenderness and no frontal sinus tenderness.   Mouth/Throat: Uvula is midline, oropharynx is clear and moist and mucous membranes are normal. No trismus in the jaw. Normal dentition. No uvula swelling. No oropharyngeal exudate or posterior oropharyngeal edema. Tonsils are 2+ on the right. Tonsils are 2+ on the left.   Eyes: Pupils are equal, round, and reactive to light.  Conjunctivae and EOM are normal. Right eye exhibits no chemosis. Left eye exhibits no chemosis. Right conjunctiva is not injected. Left conjunctiva is not injected. No scleral icterus.   Neck: Trachea normal and phonation normal. No tracheal tenderness present. No tracheal deviation present. No thyroid mass and no thyromegaly present.   Cardiovascular: Intact distal pulses.   Pulmonary/Chest: Effort normal. No accessory muscle usage or stridor. No respiratory distress.   Lymphadenopathy:        Head (right side): No submental, no submandibular, no preauricular and no posterior auricular adenopathy present.        Head (left side): No submental, no submandibular, no preauricular and no posterior auricular adenopathy present.     He has no cervical adenopathy.        Right cervical: No superficial cervical and no deep cervical adenopathy present.       Left cervical: No superficial cervical and no deep cervical adenopathy present.   Neurological: He is alert and oriented to person, place, and time. No cranial nerve deficit.   Skin: Skin is warm and dry. No rash noted. No erythema.   Psychiatric: He has a normal mood and affect. His behavior is normal. Thought content normal.       [place]-Hallpike:  Negative AU      Tympanograms today:  AD  0.7mL @ -9 daPa, ECV 2.1 mL  AS  1.7 mL @ -39 daPa, ECV 1.9 mL        TESTING on 3/7/2019:  Audiology Report:  Results reveal normal audiometric thresholds 125-8000 Hz for the right ear and for the left ear.  Low-frequency SNHL right ear is no longer present; resolved vs fluctuations?     Electrocochleography (ECoghG):  ECochG test results were obtained utilizing insert TIPTrodes with a click stimulus rate of 9.1-11.1/sec presented at 85 dBnHL.  The average SP/AP ratio was .25 for the left ear.  The average SP/AP ratio was .42 for the right ear.     Summary: Utilizing an SP/AP criteria of .40 or greater as abnormal this finding is abnormal for the right ear, suggestive of Endolymphatic  Hydrops (ELH) or Meniere's Disease (MD).      Videonystagmography Report (VNG):  Oculomotor function tests (sinusoidal tracking, saccade, optokinetic) were normal and symmetric.  Spontaneous test was absent for nystagmus.  Gaze test was absent for nystagmus.  Head-shake test was absent for after head-shake nystagmus.  Static Positional test revealed <clinically insignificant> left-beating nystagmus:              Head Center: 3 d/s LB              Head Right: 2 d/s LB              Head Left: Absent  [place]-Hallpike Right was negative for BPPV.  [place]-Hallpike Left was negative for BPPV. There was <clinically insignificant> 2 d/s LB nystagmus.   Bi-thermal caloric test was Normal.  Fixation suppression following caloric irrigations was normal.  The following values were obtained:  Unilateral weakness (UW): 13% right ear  Directional preponderance (DP): 18% left beating  RC: 23 d/s (tested X2)   d/s  RW: 13 d/s (tested X2)  LW: 26 d/s     Summary: Normal VNG.  Interestingly, there is a right ear caloric weakness to warm air stimulations, most likely consistent with right ear ELH/MD.          Assessment:       1. Meniere disease, right    2. Right asymmetrical SNHL    3. Tinnitus of right ear        Plan:         We reviewed his recent testing in detail, and it supports the diagnosis of hydrops in the the right ear.  We discussed the relevant pathology of hydrops, and that it is caused by excess endolymphatic fluid in the inner ear.  Hydrops was previously classified as Meniere's disease in which the classic constellation of symptoms included dizziness, fullness, tinnitus, and fluctuating hearing loss.  However, as more research is being done, it is now accepted that a patient with hydrops can have one, some, or all of those symptoms.  I would recommend first starting on the hydrops diet, which is a low sodium diet.  If symptoms persist, he will then have a BMP done to check electrolytes and will then start on oral  dyazide.  If symptoms are persistent after a two month trial of the diuretic, will then send a referral to Dr. Anna at [facility], as there are also other treatment modalities, including surgery, available.  I have instructed him to restart and complete the Prednisone taper as prescribed last month.  He will check when he gets home this evening but thinks he has enough doses for 60mg x 4 days, then 40mg x 3 days, 20mg x 3 days and then 10 mg x 3 days.    We reviewed the initial audiogram from 3/6/2019 and the fact that there is a distinct asymmetry of at least 15 dB across 3 frequencies.  We discussed that as humans we are not entirely symmetric, and that many people have one ear that hears better than the other.  However, considering the degree of asymmetry, I would recommend an MRI of the IAC with contrast to evaluate for any retrocochlear lesions.  If that is normal, the patient will continue to follow with annual audiograms.

## 2019-04-04 ENCOUNTER — PATIENT MESSAGE (OUTPATIENT)
Dept: OTOLARYNGOLOGY | Facility: CLINIC | Age: 34
End: 2019-04-04

## 2019-04-04 RX ORDER — PREDNISONE 20 MG/1
TABLET ORAL
Qty: 17 TABLET | Refills: 0 | Status: SHIPPED | OUTPATIENT
Start: 2019-04-04 | End: 2019-07-26

## 2019-04-15 ENCOUNTER — HOSPITAL ENCOUNTER (OUTPATIENT)
Dept: RADIOLOGY | Facility: HOSPITAL | Age: 34
Discharge: HOME | End: 2019-04-15
Attending: PHYSICIAN ASSISTANT
Payer: COMMERCIAL

## 2019-04-15 DIAGNOSIS — H93.11 TINNITUS OF RIGHT EAR: ICD-10-CM

## 2019-04-15 PROCEDURE — A9585 GADOBUTROL INJECTION: HCPCS | Performed by: PHYSICIAN ASSISTANT

## 2019-04-15 PROCEDURE — 70553 MRI BRAIN STEM W/O & W/DYE: CPT | Mod: TC

## 2019-04-15 PROCEDURE — 25500020 PHARM REV CODE 255: Performed by: PHYSICIAN ASSISTANT

## 2019-04-15 RX ORDER — GADOBUTROL 604.72 MG/ML
10 INJECTION INTRAVENOUS
Status: COMPLETED | OUTPATIENT
Start: 2019-04-15 | End: 2019-04-15

## 2019-04-15 RX ADMIN — GADOBUTROL 7 ML: 604.72 INJECTION INTRAVENOUS at 03:04

## 2019-05-02 ENCOUNTER — PATIENT MESSAGE (OUTPATIENT)
Dept: OTOLARYNGOLOGY | Facility: CLINIC | Age: 34
End: 2019-05-02

## 2019-05-20 ENCOUNTER — LAB VISIT (OUTPATIENT)
Dept: LAB | Facility: HOSPITAL | Age: 34
End: 2019-05-20
Attending: INTERNAL MEDICINE
Payer: COMMERCIAL

## 2019-05-20 DIAGNOSIS — E78.1 HYPERTRIGLYCERIDEMIA: ICD-10-CM

## 2019-05-20 LAB
CHOLEST SERPL-MCNC: 271 MG/DL (ref 120–199)
CHOLEST/HDLC SERPL: 6.3 {RATIO} (ref 2–5)
HDLC SERPL-MCNC: 43 MG/DL (ref 40–75)
HDLC SERPL: 15.9 % (ref 20–50)
LDLC SERPL CALC-MCNC: 167 MG/DL (ref 63–159)
NONHDLC SERPL-MCNC: 228 MG/DL
TRIGL SERPL-MCNC: 305 MG/DL (ref 30–150)

## 2019-05-20 PROCEDURE — 36415 COLL VENOUS BLD VENIPUNCTURE: CPT

## 2019-05-20 PROCEDURE — 80061 LIPID PANEL: CPT

## 2019-05-21 ENCOUNTER — PATIENT MESSAGE (OUTPATIENT)
Dept: INTERNAL MEDICINE | Facility: CLINIC | Age: 34
End: 2019-05-21
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
## 2019-05-21 DIAGNOSIS — M79.671 RIGHT FOOT PAIN: Primary | ICD-10-CM

## 2019-05-21 NOTE — TELEPHONE ENCOUNTER
Please get scheduled for foot xray and for consult to follow with Dr. Era Juan in Physical medicine rehab

## 2019-05-23 ENCOUNTER — HOSPITAL ENCOUNTER (OUTPATIENT)
Dept: RADIOLOGY | Facility: HOSPITAL | Age: 34
Discharge: HOME | End: 2019-05-23
Attending: FAMILY MEDICINE
Payer: COMMERCIAL

## 2019-05-23 DIAGNOSIS — M79.671 RIGHT FOOT PAIN: ICD-10-CM

## 2019-05-23 PROCEDURE — 73630 X-RAY EXAM OF FOOT: CPT | Mod: TC,RT

## 2019-05-23 PROCEDURE — 73630 X-RAY EXAM OF FOOT: CPT | Mod: 26,RT,, | Performed by: RADIOLOGY

## 2019-05-23 PROCEDURE — 73630 XR FOOT COMPLETE 3 VIEW RIGHT: ICD-10-PCS | Mod: 26,RT,, | Performed by: RADIOLOGY

## 2019-07-26 ENCOUNTER — OFFICE VISIT (OUTPATIENT)
Dept: INTERNAL MEDICINE | Facility: CLINIC | Age: 34
End: 2019-07-26
Payer: COMMERCIAL

## 2019-07-26 VITALS
HEIGHT: 68 IN | DIASTOLIC BLOOD PRESSURE: 62 MMHG | SYSTOLIC BLOOD PRESSURE: 122 MMHG | TEMPERATURE: 99 F | WEIGHT: 167.31 LBS | BODY MASS INDEX: 25.36 KG/M2 | HEART RATE: 71 BPM | OXYGEN SATURATION: 99 %

## 2019-07-26 DIAGNOSIS — L23.7 POISON IVY DERMATITIS: Primary | ICD-10-CM

## 2019-07-26 DIAGNOSIS — L03.115 CELLULITIS OF RIGHT THIGH: ICD-10-CM

## 2019-07-26 PROCEDURE — 96372 PR INJECTION,THERAP/PROPH/DIAG2ST, IM OR SUBCUT: ICD-10-PCS | Mod: S$GLB,,, | Performed by: NURSE PRACTITIONER

## 2019-07-26 PROCEDURE — 99999 PR PBB SHADOW E&M-EST. PATIENT-LVL IV: ICD-10-PCS | Mod: PBBFAC,,, | Performed by: NURSE PRACTITIONER

## 2019-07-26 PROCEDURE — 99999 PR PBB SHADOW E&M-EST. PATIENT-LVL IV: CPT | Mod: PBBFAC,,, | Performed by: NURSE PRACTITIONER

## 2019-07-26 PROCEDURE — 99214 OFFICE O/P EST MOD 30 MIN: CPT | Mod: 25,S$GLB,, | Performed by: NURSE PRACTITIONER

## 2019-07-26 PROCEDURE — 3008F PR BODY MASS INDEX (BMI) DOCUMENTED: ICD-10-PCS | Mod: CPTII,S$GLB,, | Performed by: NURSE PRACTITIONER

## 2019-07-26 PROCEDURE — 99214 PR OFFICE/OUTPT VISIT, EST, LEVL IV, 30-39 MIN: ICD-10-PCS | Mod: 25,S$GLB,, | Performed by: NURSE PRACTITIONER

## 2019-07-26 PROCEDURE — 96372 THER/PROPH/DIAG INJ SC/IM: CPT | Mod: S$GLB,,, | Performed by: NURSE PRACTITIONER

## 2019-07-26 PROCEDURE — 3008F BODY MASS INDEX DOCD: CPT | Mod: CPTII,S$GLB,, | Performed by: NURSE PRACTITIONER

## 2019-07-26 RX ORDER — CEPHALEXIN 500 MG/1
500 CAPSULE ORAL EVERY 12 HOURS
Qty: 20 CAPSULE | Refills: 0 | Status: SHIPPED | OUTPATIENT
Start: 2019-07-26 | End: 2020-02-11

## 2019-07-26 RX ORDER — HYDROXYZINE HYDROCHLORIDE 10 MG/1
TABLET, FILM COATED ORAL
Qty: 24 TABLET | Refills: 0 | Status: SHIPPED | OUTPATIENT
Start: 2019-07-26 | End: 2020-02-11

## 2019-07-26 RX ORDER — TRIAMCINOLONE ACETONIDE 0.25 MG/G
OINTMENT TOPICAL 2 TIMES DAILY
Qty: 15 G | Refills: 0 | Status: SHIPPED | OUTPATIENT
Start: 2019-07-26 | End: 2020-02-11

## 2019-07-26 RX ORDER — TRIAMCINOLONE ACETONIDE 40 MG/ML
60 INJECTION, SUSPENSION INTRA-ARTICULAR; INTRAMUSCULAR ONCE
Status: COMPLETED | OUTPATIENT
Start: 2019-07-26 | End: 2019-07-26

## 2019-07-26 RX ADMIN — TRIAMCINOLONE ACETONIDE 60 MG: 40 INJECTION, SUSPENSION INTRA-ARTICULAR; INTRAMUSCULAR at 03:07

## 2019-07-26 NOTE — PATIENT INSTRUCTIONS
Managing a Poison Ivy, Poison Oak, or Poison Sumac Reaction  If you come in contact with urushiol    If you think you may have come in contact with the sap oil (called urushiol) contained in poison ivy, poison oak, or poison sumac plants, wash the affected part of your skin. Do this within 15 minutes after contact. Use water or preferably, soap and water.  Undress, and wash your clothes and gear as soon as you can. Be sure to wash any pet that was with you. Taking these steps can help prevent spreading sap oil to someone else. If you have a rash, but are not sure if it is from one of these plants, see your healthcare provider.  To soothe the itching  Your skin may react to poison oak, poison ivy, and poison sumac within hours to a few days after contact. Once you have come into contact with these plants, you cant stop the reaction. But you can take these steps to soothe the itching:  · Dont scratch or scrub your rash. Not even if the itching is severe. Scratching can lead to infection.  · Bathe in lukewarm (not hot) water. Or take short cool showers to relieve the itching. For a more soothing bath, add oatmeal to the water.  · Use antihistamines that are taken by mouth. These include diphenhydramine. You can buy these at the pharmacy. Talk to your healthcare provider or pharmacist for more information on oral antihistamines.  · Use over-the-counter treatments on your skin. These include cortisone and calamine lotion.  How your skin may react  A mild rash may become red, swollen, and itchy. The rash may form a line on your skin where you brushed against the plant. If you have a severe rash, your itching may worsen. And your rash may blister and ooze. If this happens, seek medical care. The fluid from your blisters will not make your rash spread. With or without medical care, your rash may last up to 3 weeks. In the future, try to avoid coming in contact with these plants.  When to call your healthcare  provider  Call your healthcare provider if:  · Your rash is severe  · The rash spreads beyond the exposed part of your body or affects your face.  · The rash does not clear up within a few weeks  You may be given medicine to take by mouth or apply directly on the skin.  Call 911  Call 911 if you have any of the following:  · Trouble breathing or swallowing  · Any significant swelling  Date Last Reviewed: 3/1/2017  © 8017-9135 [employer]. [address] [place] [address], [place] [address]. All rights reserved. This information is not intended as a substitute for professional medical care. Always follow your healthcare professional's instructions.        Managing a Poison Ivy, Poison Oak, or Poison Sumac Reaction  If you come in contact with urushiol    If you think you may have come in contact with the sap oil (called urushiol) contained in poison ivy, poison oak, or poison sumac plants, wash the affected part of your skin. Do this within 15 minutes after contact. Use water or preferably, soap and water.  Undress, and wash your clothes and gear as soon as you can. Be sure to wash any pet that was with you. Taking these steps can help prevent spreading sap oil to someone else. If you have a rash, but are not sure if it is from one of these plants, see your healthcare provider.  To soothe the itching  Your skin may react to poison oak, poison ivy, and poison sumac within hours to a few days after contact. Once you have come into contact with these plants, you cant stop the reaction. But you can take these steps to soothe the itching:  · Dont scratch or scrub your rash. Not even if the itching is severe. Scratching can lead to infection.  · Bathe in lukewarm (not hot) water. Or take short cool showers to relieve the itching. For a more soothing bath, add oatmeal to the water.  · Use antihistamines that are taken by mouth. These include diphenhydramine. You can buy these at the pharmacy. Talk to your healthcare  provider or pharmacist for more information on oral antihistamines.  · Use over-the-counter treatments on your skin. These include cortisone and calamine lotion.  How your skin may react  A mild rash may become red, swollen, and itchy. The rash may form a line on your skin where you brushed against the plant. If you have a severe rash, your itching may worsen. And your rash may blister and ooze. If this happens, seek medical care. The fluid from your blisters will not make your rash spread. With or without medical care, your rash may last up to 3 weeks. In the future, try to avoid coming in contact with these plants.  When to call your healthcare provider  Call your healthcare provider if:  · Your rash is severe  · The rash spreads beyond the exposed part of your body or affects your face.  · The rash does not clear up within a few weeks  You may be given medicine to take by mouth or apply directly on the skin.  Call 911  Call 911 if you have any of the following:  · Trouble breathing or swallowing  · Any significant swelling  Date Last Reviewed: 3/1/2017  © 2285-6395 [employer]. [address] [place] [address]. All rights reserved. This information is not intended as a substitute for professional medical care. Always follow your healthcare professional's instructions.        Cellulitis  Cellulitis is an infection of the deep layers of skin. A break in the skin, such as a cut or scratch, can let bacteria under the skin. If the bacteria get to deep layers of the skin, it can be serious. If not treated, cellulitis can get into the bloodstream and lymph nodes. The infection can then spread throughout the body. This causes serious illness.  Cellulitis causes the affected skin to become red, swollen, warm, and sore. The reddened areas have a visible border. An open sore may leak fluid (pus). You may have a fever, chills, and pain.  Cellulitis is treated with antibiotics taken for 7 to 10 days. An  open sore may be cleaned and covered with cool wet gauze. Symptoms should get better 1 to 2 days after treatment is started. Make sure to take all the antibiotics for the full number of days until they are gone. Keep taking the medicine even if your symptoms go away.  Home care  Follow these tips:  · Limit the use of the part of your body with cellulitis.   · If the infection is on your leg, keep your leg raised while sitting. This will help to reduce swelling.  · Take all of the antibiotic medicine exactly as directed until it is gone. Do not miss any doses, especially during the first 7 days. Dont stop taking the medicine when your symptoms get better.  · Keep the affected area clean and dry.  · Wash your hands with soap and warm water before and after touching your skin. Anyone else who touches your skin should also wash his or her hands. Don't share towels.  Follow-up care  Follow up with your healthcare provider, or as advised. If your infection does not go away on the first antibiotic, your healthcare provider will prescribe a different one.  When to seek medical advice  Call your healthcare provider right away if any of these occur:  · Red areas that spread  · Swelling or pain that gets worse  · Fluid leaking from the skin (pus)  · Fever higher of 100.4º F (38.0º C) or higher after 2 days on antibiotics  Date Last Reviewed: 9/1/2016  © 3295-5946 [employer]. [address] [place] [address], [place] [address]. All rights reserved. This information is not intended as a substitute for professional medical care. Always follow your healthcare professional's instructions.

## 2019-07-26 NOTE — PROGRESS NOTES
Subjective:       Patient ID: Kaden Heller is a 34 y.o. male.    Chief Complaint: Poison Ivy    Rash   This is a new problem. The current episode started 1 to 4 weeks ago. The problem has been waxing and waning since onset. The rash is diffusetorso, abdomen, left ankle, right arm, right hand, right fingers, right upper leg and right lower leg. The rash is characterized by redness, swelling, draining and itchiness. He was exposed to plant contact. Pertinent negatives include no anorexia, congestion, cough, diarrhea, eye pain, facial edema, fatigue, fever, joint pain, nail changes, rhinorrhea, shortness of breath, sore throat or vomiting. Past treatments include anti-itch cream. The treatment provided mild relief. His past medical history is significant for varicella. There is no history of allergies, asthma or eczema.           Past Medical History:   Diagnosis Date    Mixed hyperlipidemia 2/18/2019     Past Surgical History:   Procedure Laterality Date    APPENDECTOMY      HIP ARTHROSCOPY W/ LABRAL REPAIR       Past Surgical History:   Procedure Laterality Date    APPENDECTOMY      HIP ARTHROSCOPY W/ LABRAL REPAIR       Social History     Socioeconomic History    Marital status: Single     Spouse name: Not on file    Number of children: Not on file    Years of education: Not on file    Highest education level: Not on file   Occupational History    Not on file   Social Needs    Financial resource strain: Not on file    Food insecurity:     Worry: Not on file     Inability: Not on file    Transportation needs:     Medical: Not on file     Non-medical: Not on file   Tobacco Use    Smoking status: Never Smoker    Smokeless tobacco: Never Used   Substance and Sexual Activity    Alcohol use: Yes     Alcohol/week: 1.8 oz     Types: 2 Cans of beer, 1 Shots of liquor per week    Drug use: No    Sexual activity: Never     Birth control/protection: None   Lifestyle    Physical activity:     Days per week:  Not on file     Minutes per session: Not on file    Stress: Not on file   Relationships    Social connections:     Talks on phone: Not on file     Gets together: Not on file     Attends Rastafarian service: Not on file     Active member of club or organization: Not on file     Attends meetings of clubs or organizations: Not on file     Relationship status: Not on file   Other Topics Concern    Not on file   Social History Narrative    Not on file     Review of patient's allergies indicates:   Allergen Reactions    Hazelnut Itching, Other (See Comments) and Swelling    Pérez Itching, Other (See Comments) and Swelling     Current Outpatient Medications   Medication Sig    cephALEXin (KEFLEX) 500 MG capsule Take 1 capsule (500 mg total) by mouth every 12 (twelve) hours.    hydrOXYzine HCl (ATARAX) 10 MG Tab Take nightly as needed; if tolerated can be taken every 8 hours as needed    triamcinolone acetonide 0.025% (KENALOG) 0.025 % Oint Apply topically 2 (two) times daily. 10- 14 days     No current facility-administered medications for this visit.            Review of Systems   Constitutional: Negative for activity change, appetite change, chills, diaphoresis, fatigue, fever and unexpected weight change.   HENT: Negative for congestion, ear pain, postnasal drip, rhinorrhea, sinus pressure, sinus pain, sneezing, sore throat, tinnitus, trouble swallowing and voice change.    Eyes: Negative for photophobia, pain and visual disturbance.   Respiratory: Negative for cough, chest tightness, shortness of breath and wheezing.    Cardiovascular: Negative for chest pain, palpitations and leg swelling.   Gastrointestinal: Negative for abdominal distention, abdominal pain, anorexia, constipation, diarrhea, nausea and vomiting.   Genitourinary: Negative for decreased urine volume, difficulty urinating, dysuria, flank pain, frequency, hematuria and urgency.   Musculoskeletal: Negative for arthralgias, back pain, joint pain,  joint swelling, neck pain and neck stiffness.   Skin: Positive for rash. Negative for nail changes.   Allergic/Immunologic: Negative for immunocompromised state.   Neurological: Negative for dizziness, tremors, seizures, syncope, facial asymmetry, speech difficulty, weakness, light-headedness, numbness and headaches.   Hematological: Negative for adenopathy. Does not bruise/bleed easily.   Psychiatric/Behavioral: Negative for confusion and sleep disturbance.       Objective:      Physical Exam   Constitutional: He is oriented to person, place, and time.   HENT:   Head: Normocephalic and atraumatic.   Right Ear: Tympanic membrane normal.   Left Ear: Tympanic membrane normal.   Eyes: Conjunctivae and EOM are normal.   Neck: Normal range of motion. Neck supple.   Cardiovascular: Normal rate, regular rhythm, normal heart sounds and intact distal pulses.   Pulmonary/Chest: Effort normal and breath sounds normal.   Abdominal: Soft. Bowel sounds are normal.   Musculoskeletal: Normal range of motion.   Neurological: He is alert and oriented to person, place, and time.   Skin: Skin is warm and dry. Rash noted. Rash is papular and urticarial. There is erythema.            Assessment:     Vitals:    07/26/19 1448   BP: 122/62   Pulse: 71   Temp: 98.6 °F (37 °C)         1. Poison ivy dermatitis    2. Cellulitis of right thigh        Plan:   Poison ivy dermatitis  -     triamcinolone acetonide injection 60 mg  -     triamcinolone acetonide 0.025% (KENALOG) 0.025 % Oint; Apply topically 2 (two) times daily. 10- 14 days  Dispense: 15 g; Refill: 0    Cellulitis of right thigh  -     cephALEXin (KEFLEX) 500 MG capsule; Take 1 capsule (500 mg total) by mouth every 12 (twelve) hours.  Dispense: 20 capsule; Refill: 0    Other orders  -     hydrOXYzine HCl (ATARAX) 10 MG Tab; Take nightly as needed; if tolerated can be taken every 8 hours as needed  Dispense: 24 tablet; Refill: 0          As above  AVS given and discussed  meds and SE  discussed  F/u PRN

## 2019-12-04 ENCOUNTER — OFFICE VISIT (OUTPATIENT)
Dept: OTOLARYNGOLOGY | Facility: CLINIC | Age: 34
End: 2019-12-04
Payer: COMMERCIAL

## 2019-12-04 VITALS — WEIGHT: 159.63 LBS | BODY MASS INDEX: 23.64 KG/M2 | HEIGHT: 69 IN | TEMPERATURE: 99 F

## 2019-12-04 DIAGNOSIS — H93.11 TINNITUS OF RIGHT EAR: ICD-10-CM

## 2019-12-04 DIAGNOSIS — H81.01 MENIERE DISEASE, RIGHT: Primary | ICD-10-CM

## 2019-12-04 DIAGNOSIS — J06.9 UPPER RESPIRATORY TRACT INFECTION, UNSPECIFIED TYPE: ICD-10-CM

## 2019-12-04 PROCEDURE — 3008F BODY MASS INDEX DOCD: CPT | Mod: CPTII,S$GLB,, | Performed by: PHYSICIAN ASSISTANT

## 2019-12-04 PROCEDURE — 3008F PR BODY MASS INDEX (BMI) DOCUMENTED: ICD-10-PCS | Mod: CPTII,S$GLB,, | Performed by: PHYSICIAN ASSISTANT

## 2019-12-04 PROCEDURE — 99214 PR OFFICE/OUTPT VISIT, EST, LEVL IV, 30-39 MIN: ICD-10-PCS | Mod: S$GLB,,, | Performed by: PHYSICIAN ASSISTANT

## 2019-12-04 PROCEDURE — 99999 PR PBB SHADOW E&M-EST. PATIENT-LVL III: ICD-10-PCS | Mod: PBBFAC,,, | Performed by: PHYSICIAN ASSISTANT

## 2019-12-04 PROCEDURE — 99999 PR PBB SHADOW E&M-EST. PATIENT-LVL III: CPT | Mod: PBBFAC,,, | Performed by: PHYSICIAN ASSISTANT

## 2019-12-04 PROCEDURE — 99214 OFFICE O/P EST MOD 30 MIN: CPT | Mod: S$GLB,,, | Performed by: PHYSICIAN ASSISTANT

## 2019-12-04 RX ORDER — MECLIZINE HCL 12.5 MG 12.5 MG/1
12.5 TABLET ORAL 3 TIMES DAILY PRN
Qty: 30 TABLET | Refills: 0 | Status: SHIPPED | OUTPATIENT
Start: 2019-12-04 | End: 2020-06-02 | Stop reason: SDUPTHER

## 2019-12-04 RX ORDER — FLUTICASONE PROPIONATE 50 MCG
2 SPRAY, SUSPENSION (ML) NASAL DAILY
Qty: 16 G | Refills: 12 | Status: SHIPPED | OUTPATIENT
Start: 2019-12-04 | End: 2022-02-25

## 2019-12-04 NOTE — PROGRESS NOTES
Subjective:       Patient ID: Kaden Heller is a 34 y.o. male.    Chief Complaint: Other (Pressure/ringing in both ear for the last 2 days he has also had dizziness.)    Patient is a very pleasant 34 year old gentleman here to see me today for evaluation of worsening pressure and tinnitus AD since just before Thanksgiving.  He was first here with me in 3/2019 and diagnosed with hydrops of the RIGHT ear on EcoG and VNG with right caloric weakness as well.  He also had low-frequency HL on the RIGHT that resolved the next day.  He's had negative MRI IAC.  He reports doing very well for several months with no ear issues.  Has been running more and lost about 10 pounds since August.  He says he adheres to low-salt diet and limits his caffeine intake.  He started with URI about 3 weeks ago which has gradually improved except still with dry cough.  Denies respiratory distress or shortness of breath.  Denies fever.  He complains aural fullness AD > AS and tinnitus AD.  Yesterday he felt dizzy as well (not vertigo or spinning).  He says the aural pressure gets quite intense and is distracting, making it hard to concentrate.  He has taken few doses of Ibuprofen for cough.  His voice has been different (not hoarse) since this recent URI/cough.    Review of Systems   Constitutional: Negative for fever.   HENT: Positive for ear pain (pressure AD > AS), postnasal drip, rhinorrhea (in the mornings), sinus pressure (at times (frontal)) and tinnitus (AD). Negative for congestion, ear discharge, hearing loss and sinus pain.    Eyes: Negative for visual disturbance.   Respiratory: Positive for cough. Negative for shortness of breath and stridor.    Gastrointestinal: Negative for nausea and vomiting.   Musculoskeletal: Negative for gait problem.   Neurological: Positive for dizziness. Negative for light-headedness.       Objective:      Physical Exam   Constitutional: He is oriented to person, place, and time. Vital signs are normal. He  appears well-developed and well-nourished. He is cooperative. He does not have a sickly appearance. He does not appear ill.   HENT:   Head: Normocephalic and atraumatic.   Right Ear: Tympanic membrane, external ear and ear canal normal. No drainage. Tympanic membrane is not injected and not erythematous. No middle ear effusion.   Left Ear: Tympanic membrane, external ear and ear canal normal. No drainage. Tympanic membrane is not injected and not erythematous.  No middle ear effusion.   Nose: No mucosal edema, rhinorrhea, nasal deformity or septal deviation. Right sinus exhibits no maxillary sinus tenderness and no frontal sinus tenderness. Left sinus exhibits no maxillary sinus tenderness and no frontal sinus tenderness.   Mouth/Throat: Uvula is midline, oropharynx is clear and moist and mucous membranes are normal. No trismus in the jaw. Normal dentition. No uvula swelling. No oropharyngeal exudate or posterior oropharyngeal edema. Tonsils are 2+ on the right. Tonsils are 2+ on the left.   Eyes: Pupils are equal, round, and reactive to light. Conjunctivae and EOM are normal. Right eye exhibits no chemosis. Left eye exhibits no chemosis. Right conjunctiva is not injected. Left conjunctiva is not injected. No scleral icterus.   Neck: Trachea normal and phonation normal. No tracheal tenderness present. No tracheal deviation present. No thyroid mass and no thyromegaly present.   Cardiovascular: Intact distal pulses.   Pulmonary/Chest: Effort normal. No accessory muscle usage or stridor. No tachypnea. No respiratory distress.   No cough or respiratory distress during exam   Lymphadenopathy:        Head (right side): No submental, no submandibular, no preauricular and no posterior auricular adenopathy present.        Head (left side): No submental, no submandibular, no preauricular and no posterior auricular adenopathy present.     He has no cervical adenopathy.        Right cervical: No superficial cervical and no deep  cervical adenopathy present.       Left cervical: No superficial cervical and no deep cervical adenopathy present.   Neurological: He is alert and oriented to person, place, and time. No cranial nerve deficit.   Skin: Skin is warm and dry. No rash noted. No erythema.   Psychiatric: He has a normal mood and affect. His behavior is normal. Thought content normal.       Assessment:       1. Meniere disease, right    2. Tinnitus of right ear    3. Upper respiratory tract infection, unspecified type        Plan:         We reviewed that his testing from March is consistent with hydrops in the the right ear.  We discussed the relevant pathology of hydrops, and that it is caused by excess endolymphatic fluid in the inner ear.  Hydrops was previously classified as Meniere's disease in which the classic constellation of symptoms included dizziness, fullness, tinnitus, and fluctuating hearing loss.  However, as more research is being done, it is now accepted that a patient with hydrops can have one, some, or all of those symptoms.  He reports adherence to the hydrops diet, which is a low sodium diet.  Discussed with him that the next step if symptoms persist, is to start diuretic.  He will need to have a BMP done to check electrolytes and will then start on oral dyazide.  If symptoms are persistent after a two month trial of the diuretic, will then send a referral to Dr. Anna at [facility], as there are also other treatment modalities, including surgery, available.      Discussed with him that unfortunately steroids aren't usually the treatment for an acute flare of Meniere's.  I recommend trial of Meclizine or Antivert (vestibular suppressants) for acute flare and consider something for nausea if needed.  Discussed that Klonopin may help with vertigo as well.      Discussed that cough is usually the last symptom of URI to resolve.  He can use Flonase and OTC Zyrtec or Claritin as needed for congestion  and PND.  Recommend RTC if symptoms persist greater than 8-12 weeks.

## 2019-12-04 NOTE — PATIENT INSTRUCTIONS
Flonase daily  Claritin or Zyrtec daily for postnasal drainage  Antivert as needed for Meniere's flare-up (prescription sent downstairs)

## 2019-12-19 ENCOUNTER — PATIENT MESSAGE (OUTPATIENT)
Dept: OTOLARYNGOLOGY | Facility: CLINIC | Age: 34
End: 2019-12-19

## 2019-12-19 ENCOUNTER — LAB VISIT (OUTPATIENT)
Dept: LAB | Facility: HOSPITAL | Age: 34
End: 2019-12-19
Attending: PHYSICIAN ASSISTANT
Payer: COMMERCIAL

## 2019-12-19 DIAGNOSIS — H81.01 MENIERE DISEASE, RIGHT: Primary | ICD-10-CM

## 2019-12-19 DIAGNOSIS — H81.01 MENIERE DISEASE, RIGHT: ICD-10-CM

## 2019-12-19 LAB
ANION GAP SERPL CALC-SCNC: 10 MMOL/L (ref 8–16)
BUN SERPL-MCNC: 14 MG/DL (ref 6–20)
CALCIUM SERPL-MCNC: 9.7 MG/DL (ref 8.7–10.5)
CHLORIDE SERPL-SCNC: 104 MMOL/L (ref 95–110)
CO2 SERPL-SCNC: 28 MMOL/L (ref 23–29)
CREAT SERPL-MCNC: 0.9 MG/DL (ref 0.5–1.4)
EST. GFR  (AFRICAN AMERICAN): >60 ML/MIN/1.73 M^2
EST. GFR  (NON AFRICAN AMERICAN): >60 ML/MIN/1.73 M^2
GLUCOSE SERPL-MCNC: 86 MG/DL (ref 70–110)
POTASSIUM SERPL-SCNC: 3.8 MMOL/L (ref 3.5–5.1)
SODIUM SERPL-SCNC: 142 MMOL/L (ref 136–145)

## 2019-12-19 PROCEDURE — 36415 COLL VENOUS BLD VENIPUNCTURE: CPT

## 2019-12-19 PROCEDURE — 80048 BASIC METABOLIC PNL TOTAL CA: CPT

## 2019-12-20 RX ORDER — TRIAMTERENE AND HYDROCHLOROTHIAZIDE 37.5; 25 MG/1; MG/1
1 CAPSULE ORAL EVERY MORNING
Qty: 30 CAPSULE | Refills: 1 | Status: SHIPPED | OUTPATIENT
Start: 2019-12-20 | End: 2020-02-21 | Stop reason: SDUPTHER

## 2020-02-11 ENCOUNTER — OFFICE VISIT (OUTPATIENT)
Dept: INTERNAL MEDICINE | Facility: CLINIC | Age: 35
End: 2020-02-11
Payer: COMMERCIAL

## 2020-02-11 VITALS
HEART RATE: 98 BPM | HEIGHT: 69 IN | DIASTOLIC BLOOD PRESSURE: 100 MMHG | WEIGHT: 157.88 LBS | TEMPERATURE: 100 F | SYSTOLIC BLOOD PRESSURE: 130 MMHG | BODY MASS INDEX: 23.38 KG/M2 | OXYGEN SATURATION: 99 %

## 2020-02-11 DIAGNOSIS — J11.1 INFLUENZA: Primary | ICD-10-CM

## 2020-02-11 PROCEDURE — 3008F BODY MASS INDEX DOCD: CPT | Mod: CPTII,S$GLB,, | Performed by: FAMILY MEDICINE

## 2020-02-11 PROCEDURE — 99214 PR OFFICE/OUTPT VISIT, EST, LEVL IV, 30-39 MIN: ICD-10-PCS | Mod: S$GLB,,, | Performed by: FAMILY MEDICINE

## 2020-02-11 PROCEDURE — 99999 PR PBB SHADOW E&M-EST. PATIENT-LVL III: ICD-10-PCS | Mod: PBBFAC,,, | Performed by: FAMILY MEDICINE

## 2020-02-11 PROCEDURE — 99999 PR PBB SHADOW E&M-EST. PATIENT-LVL III: CPT | Mod: PBBFAC,,, | Performed by: FAMILY MEDICINE

## 2020-02-11 PROCEDURE — 3008F PR BODY MASS INDEX (BMI) DOCUMENTED: ICD-10-PCS | Mod: CPTII,S$GLB,, | Performed by: FAMILY MEDICINE

## 2020-02-11 PROCEDURE — 99214 OFFICE O/P EST MOD 30 MIN: CPT | Mod: S$GLB,,, | Performed by: FAMILY MEDICINE

## 2020-02-11 RX ORDER — OSELTAMIVIR PHOSPHATE 75 MG/1
75 CAPSULE ORAL 2 TIMES DAILY
Qty: 10 CAPSULE | Refills: 0 | Status: SHIPPED | OUTPATIENT
Start: 2020-02-11 | End: 2020-02-16

## 2020-02-11 NOTE — PROGRESS NOTES
Subjective:       Patient ID: Kaden Heller is a 34 y.o. male.    Chief Complaint: Cough; Headache; and Nasal Congestion    HPI *yesterday some nasal congestion  cough without fever shortness of breath or malaise and today abupt malaise and body aches, headache. .  Some sore throat points to largyn area. No Over-the-counter cold medications ** used.  Past Medical History:   Diagnosis Date    Meniere disease 12/2019    Mixed hyperlipidemia 2/18/2019     Past Surgical History:   Procedure Laterality Date    APPENDECTOMY      HIP ARTHROSCOPY W/ LABRAL REPAIR       Family History   Problem Relation Age of Onset    Heart disease Mother     Heart attack Mother     Asthma Father     Anemia Sister     No Known Problems Daughter     No Known Problems Son     Heart attack Maternal Uncle     Heart disease Maternal Uncle     Heart attack Maternal Grandmother     Heart disease Maternal Grandmother     Heart attack Maternal Grandfather     Heart disease Maternal Grandfather      Social History     Socioeconomic History    Marital status: Single     Spouse name: Not on file    Number of children: Not on file    Years of education: Not on file    Highest education level: Not on file   Occupational History    Not on file   Social Needs    Financial resource strain: Not on file    Food insecurity:     Worry: Not on file     Inability: Not on file    Transportation needs:     Medical: Not on file     Non-medical: Not on file   Tobacco Use    Smoking status: Never Smoker    Smokeless tobacco: Never Used   Substance and Sexual Activity    Alcohol use: Yes     Alcohol/week: 3.0 standard drinks     Types: 2 Cans of beer, 1 Shots of liquor per week    Drug use: No    Sexual activity: Never     Birth control/protection: None   Lifestyle    Physical activity:     Days per week: Not on file     Minutes per session: Not on file    Stress: Not on file   Relationships    Social connections:     Talks on phone: Not  on file     Gets together: Not on file     Attends Oriental orthodox service: Not on file     Active member of club or organization: Not on file     Attends meetings of clubs or organizations: Not on file     Relationship status: Not on file   Other Topics Concern    Not on file   Social History Narrative    Not on file       Review of Systems  no cp sob  Objective:      Physical Exam   Constitutional: He is oriented to person, place, and time. He appears well-developed and well-nourished.   HENT:   Head: Normocephalic and atraumatic.   Right Ear: External ear normal.   Left Ear: External ear normal.   Nose: Nose normal.   Mouth/Throat: Oropharynx is clear and moist. No oropharyngeal exudate.   Nl tms   Eyes: Pupils are equal, round, and reactive to light. Conjunctivae and EOM are normal.   Neck: Normal range of motion. Neck supple. Carotid bruit is not present.   No meningismus   Cardiovascular: Normal rate and regular rhythm.   Pulmonary/Chest: Effort normal and breath sounds normal.   Lymphadenopathy:     He has no cervical adenopathy.   Neurological: He is alert and oriented to person, place, and time.   Skin: Skin is warm and dry.   Psychiatric: He has a normal mood and affect. His behavior is normal. Judgment and thought content normal.       Assessment:       1. Influenza      elev bp  Plan:     rest, hydrate  Secondary infxn precautions discussed  **return to work when free of fever 24 hours  Extra tylenol every 6 hours as needed for fever or aches  Ibuprofen 400 mg every 6 hours as needed.   You can overlap these two every few hours  Monitor blood pressure when recovered and notify dr duran if 140/90 or higher     Influenza    Other orders  -     oseltamivir (TAMIFLU) 75 MG capsule; Take 1 capsule (75 mg total) by mouth 2 (two) times daily. for 5 days  Dispense: 10 capsule; Refill: 0    contag. Precautions    D/wd +/- flu test in context of mult folks out with flu at the [facility].   *

## 2020-02-11 NOTE — PATIENT INSTRUCTIONS
Return to work when free of fever 24 hours  Extra tylenol every 6 hours as needed for fever or aches  Ibuprofen 400 mg every 6 hours as needed.   You can overlap these two every few hours  Monitor blood pressure when recovered and notify dr duran if 140/90 or higher

## 2020-02-13 ENCOUNTER — TELEPHONE (OUTPATIENT)
Dept: INTERNAL MEDICINE | Facility: CLINIC | Age: 35
End: 2020-02-13

## 2020-02-19 ENCOUNTER — PATIENT MESSAGE (OUTPATIENT)
Dept: OTOLARYNGOLOGY | Facility: CLINIC | Age: 35
End: 2020-02-19

## 2020-02-19 DIAGNOSIS — H81.01 MENIERE DISEASE, RIGHT: Primary | ICD-10-CM

## 2020-02-19 NOTE — TELEPHONE ENCOUNTER
BMP ordered; need it checked before additional Dyazide refills.  Patient will let us know when he plans to go to lab.  Will email him with results.

## 2020-02-20 ENCOUNTER — LAB VISIT (OUTPATIENT)
Dept: LAB | Facility: HOSPITAL | Age: 35
End: 2020-02-20
Attending: PHYSICIAN ASSISTANT
Payer: COMMERCIAL

## 2020-02-20 DIAGNOSIS — H81.01 MENIERE DISEASE, RIGHT: ICD-10-CM

## 2020-02-20 LAB
ANION GAP SERPL CALC-SCNC: 7 MMOL/L (ref 8–16)
BUN SERPL-MCNC: 13 MG/DL (ref 6–20)
CALCIUM SERPL-MCNC: 9 MG/DL (ref 8.7–10.5)
CHLORIDE SERPL-SCNC: 103 MMOL/L (ref 95–110)
CO2 SERPL-SCNC: 29 MMOL/L (ref 23–29)
CREAT SERPL-MCNC: 0.8 MG/DL (ref 0.5–1.4)
EST. GFR  (AFRICAN AMERICAN): >60 ML/MIN/1.73 M^2
EST. GFR  (NON AFRICAN AMERICAN): >60 ML/MIN/1.73 M^2
GLUCOSE SERPL-MCNC: 80 MG/DL (ref 70–110)
POTASSIUM SERPL-SCNC: 4.2 MMOL/L (ref 3.5–5.1)
SODIUM SERPL-SCNC: 139 MMOL/L (ref 136–145)

## 2020-02-20 PROCEDURE — 36415 COLL VENOUS BLD VENIPUNCTURE: CPT

## 2020-02-20 PROCEDURE — 80048 BASIC METABOLIC PNL TOTAL CA: CPT

## 2020-02-21 ENCOUNTER — PATIENT MESSAGE (OUTPATIENT)
Dept: OTOLARYNGOLOGY | Facility: CLINIC | Age: 35
End: 2020-02-21

## 2020-02-21 RX ORDER — TRIAMTERENE AND HYDROCHLOROTHIAZIDE 37.5; 25 MG/1; MG/1
1 CAPSULE ORAL EVERY MORNING
Qty: 30 CAPSULE | Refills: 11 | Status: SHIPPED | OUTPATIENT
Start: 2020-02-21 | End: 2022-02-25

## 2020-03-05 ENCOUNTER — PATIENT MESSAGE (OUTPATIENT)
Dept: INTERNAL MEDICINE | Facility: CLINIC | Age: 35
End: 2020-03-05

## 2020-03-12 ENCOUNTER — HOSPITAL ENCOUNTER (OUTPATIENT)
Dept: RADIOLOGY | Facility: HOSPITAL | Age: 35
Discharge: HOME | End: 2020-03-12
Attending: FAMILY MEDICINE
Payer: COMMERCIAL

## 2020-03-12 ENCOUNTER — OFFICE VISIT (OUTPATIENT)
Dept: INTERNAL MEDICINE | Facility: CLINIC | Age: 35
End: 2020-03-12
Payer: COMMERCIAL

## 2020-03-12 VITALS
WEIGHT: 156.31 LBS | HEART RATE: 84 BPM | TEMPERATURE: 98 F | BODY MASS INDEX: 23.15 KG/M2 | DIASTOLIC BLOOD PRESSURE: 85 MMHG | OXYGEN SATURATION: 98 % | SYSTOLIC BLOOD PRESSURE: 145 MMHG | HEIGHT: 69 IN

## 2020-03-12 DIAGNOSIS — N50.89 EPIDIDYMAL MASS: ICD-10-CM

## 2020-03-12 DIAGNOSIS — N50.89 TESTICLE LUMP: Primary | ICD-10-CM

## 2020-03-12 DIAGNOSIS — R03.0 ELEVATED BLOOD PRESSURE READING: ICD-10-CM

## 2020-03-12 PROCEDURE — 99214 PR OFFICE/OUTPT VISIT, EST, LEVL IV, 30-39 MIN: ICD-10-PCS | Mod: S$GLB,,, | Performed by: FAMILY MEDICINE

## 2020-03-12 PROCEDURE — 3008F BODY MASS INDEX DOCD: CPT | Mod: CPTII,S$GLB,, | Performed by: FAMILY MEDICINE

## 2020-03-12 PROCEDURE — 76870 US EXAM SCROTUM: CPT | Mod: TC

## 2020-03-12 PROCEDURE — 99999 PR PBB SHADOW E&M-EST. PATIENT-LVL III: CPT | Mod: PBBFAC,,, | Performed by: FAMILY MEDICINE

## 2020-03-12 PROCEDURE — 3008F PR BODY MASS INDEX (BMI) DOCUMENTED: ICD-10-PCS | Mod: CPTII,S$GLB,, | Performed by: FAMILY MEDICINE

## 2020-03-12 PROCEDURE — 76870 US EXAM SCROTUM: CPT | Mod: 26,,, | Performed by: RADIOLOGY

## 2020-03-12 PROCEDURE — 76870 US SCROTUM AND TESTICLES: ICD-10-PCS | Mod: 26,,, | Performed by: RADIOLOGY

## 2020-03-12 PROCEDURE — 99999 PR PBB SHADOW E&M-EST. PATIENT-LVL III: ICD-10-PCS | Mod: PBBFAC,,, | Performed by: FAMILY MEDICINE

## 2020-03-12 PROCEDURE — 99214 OFFICE O/P EST MOD 30 MIN: CPT | Mod: S$GLB,,, | Performed by: FAMILY MEDICINE

## 2020-03-12 NOTE — PROGRESS NOTES
Subjective:       Patient ID: Kaden Heller is a 34 y.o. male.    Chief Complaint: Testicle Pain and Mass    HPIhas noticed lump in epididymal area and some ttp;noticed a week ago. No dysuria or changes in urination;last self exam prior to recently noticing was 2 months ago and was not there  Running/biking often and no pain  Several yrs ago dxd epididymitis     elev bp ,he attrib to stress. grfather having surg today. infreq etoh. No nsaids, no decongest    Past Medical History:   Diagnosis Date    Meniere disease 12/2019    Mixed hyperlipidemia 2/18/2019     Past Surgical History:   Procedure Laterality Date    APPENDECTOMY      HIP ARTHROSCOPY W/ LABRAL REPAIR       Family History   Problem Relation Age of Onset    Heart disease Mother     Heart attack Mother     Asthma Father     Anemia Sister     No Known Problems Daughter     No Known Problems Son     Heart attack Maternal Uncle     Heart disease Maternal Uncle     Heart attack Maternal Grandmother     Heart disease Maternal Grandmother     Heart attack Maternal Grandfather     Heart disease Maternal Grandfather      Social History     Socioeconomic History    Marital status: Single     Spouse name: Not on file    Number of children: Not on file    Years of education: Not on file    Highest education level: Not on file   Occupational History    Not on file   Social Needs    Financial resource strain: Not on file    Food insecurity:     Worry: Not on file     Inability: Not on file    Transportation needs:     Medical: Not on file     Non-medical: Not on file   Tobacco Use    Smoking status: Never Smoker    Smokeless tobacco: Never Used   Substance and Sexual Activity    Alcohol use: Yes     Alcohol/week: 3.0 standard drinks     Types: 2 Cans of beer, 1 Shots of liquor per week    Drug use: No    Sexual activity: Never     Birth control/protection: None   Lifestyle    Physical activity:     Days per week: Not on file     Minutes per  session: Not on file    Stress: Not on file   Relationships    Social connections:     Talks on phone: Not on file     Gets together: Not on file     Attends Mosque service: Not on file     Active member of club or organization: Not on file     Attends meetings of clubs or organizations: Not on file     Relationship status: Not on file   Other Topics Concern    Not on file   Social History Narrative    Not on file       Review of Systems    Objective:      Physical Exam   Constitutional: He appears well-developed and well-nourished.   Cardiovascular: Normal rate and regular rhythm.   Pulmonary/Chest: Effort normal and breath sounds normal.     gu no bilat hernia. bilat testicles normal. Left epid. Mass <1 cm no ttp  Assessment:       1. Epididymal mass     elev bp no hxhtn  Recent stressors  Plan:       **home bps  Hold off on ua, abx  Obtain u/s*    F/u on bp in about fw weeks    Testicle lump    Elevated blood pressure reading    Epididymal mass  -     US Scrotum And Testicles; Future; Expected date: 03/12/2020

## 2020-04-21 DIAGNOSIS — Z01.84 ANTIBODY RESPONSE EXAMINATION: ICD-10-CM

## 2020-04-30 ENCOUNTER — LAB VISIT (OUTPATIENT)
Dept: LAB | Facility: HOSPITAL | Age: 35
End: 2020-04-30
Attending: INTERNAL MEDICINE
Payer: COMMERCIAL

## 2020-04-30 DIAGNOSIS — Z01.84 ANTIBODY RESPONSE EXAMINATION: ICD-10-CM

## 2020-04-30 LAB — SARS-COV-2 IGG SERPLBLD QL IA.RAPID: NEGATIVE

## 2020-04-30 PROCEDURE — 86769 SARS-COV-2 COVID-19 ANTIBODY: CPT

## 2020-04-30 PROCEDURE — 36415 COLL VENOUS BLD VENIPUNCTURE: CPT

## 2020-05-26 ENCOUNTER — HOSPITAL ENCOUNTER (OUTPATIENT)
Dept: RADIOLOGY | Facility: HOSPITAL | Age: 35
Discharge: HOME | End: 2020-05-26
Attending: FAMILY MEDICINE
Payer: COMMERCIAL

## 2020-05-26 ENCOUNTER — TELEPHONE (OUTPATIENT)
Dept: INTERNAL MEDICINE | Facility: CLINIC | Age: 35
End: 2020-05-26

## 2020-05-26 ENCOUNTER — PATIENT MESSAGE (OUTPATIENT)
Dept: INTERNAL MEDICINE | Facility: CLINIC | Age: 35
End: 2020-05-26

## 2020-05-26 DIAGNOSIS — M79.641 RIGHT HAND PAIN: Primary | ICD-10-CM

## 2020-05-26 DIAGNOSIS — M79.641 RIGHT HAND PAIN: ICD-10-CM

## 2020-05-26 PROCEDURE — 73130 X-RAY EXAM OF HAND: CPT | Mod: TC,RT

## 2020-05-26 PROCEDURE — 73130 X-RAY EXAM OF HAND: CPT | Mod: 26,RT,, | Performed by: RADIOLOGY

## 2020-05-26 PROCEDURE — 73130 XR HAND COMPLETE 3 VIEW RIGHT: ICD-10-PCS | Mod: 26,RT,, | Performed by: RADIOLOGY

## 2020-06-03 RX ORDER — MECLIZINE HCL 12.5 MG 12.5 MG/1
12.5 TABLET ORAL 2 TIMES DAILY PRN
Qty: 30 TABLET | Refills: 0 | Status: SHIPPED | OUTPATIENT
Start: 2020-06-03 | End: 2022-02-25 | Stop reason: SDUPTHER

## 2020-06-16 ENCOUNTER — OFFICE VISIT (OUTPATIENT)
Dept: INTERNAL MEDICINE | Facility: CLINIC | Age: 35
End: 2020-06-16
Payer: COMMERCIAL

## 2020-06-16 VITALS
WEIGHT: 159.38 LBS | HEIGHT: 69 IN | BODY MASS INDEX: 23.6 KG/M2 | HEART RATE: 62 BPM | OXYGEN SATURATION: 99 % | RESPIRATION RATE: 16 BRPM | DIASTOLIC BLOOD PRESSURE: 84 MMHG | SYSTOLIC BLOOD PRESSURE: 132 MMHG | TEMPERATURE: 97 F

## 2020-06-16 DIAGNOSIS — R03.0 BLOOD PRESSURE ELEVATED WITHOUT HISTORY OF HTN: Primary | ICD-10-CM

## 2020-06-16 PROCEDURE — 3008F PR BODY MASS INDEX (BMI) DOCUMENTED: ICD-10-PCS | Mod: CPTII,S$GLB,, | Performed by: FAMILY MEDICINE

## 2020-06-16 PROCEDURE — 99212 OFFICE O/P EST SF 10 MIN: CPT | Mod: S$GLB,,, | Performed by: FAMILY MEDICINE

## 2020-06-16 PROCEDURE — 99212 PR OFFICE/OUTPT VISIT, EST, LEVL II, 10-19 MIN: ICD-10-PCS | Mod: S$GLB,,, | Performed by: FAMILY MEDICINE

## 2020-06-16 PROCEDURE — 99999 PR PBB SHADOW E&M-EST. PATIENT-LVL III: ICD-10-PCS | Mod: PBBFAC,,, | Performed by: FAMILY MEDICINE

## 2020-06-16 PROCEDURE — 99999 PR PBB SHADOW E&M-EST. PATIENT-LVL III: CPT | Mod: PBBFAC,,, | Performed by: FAMILY MEDICINE

## 2020-06-16 PROCEDURE — 3008F BODY MASS INDEX DOCD: CPT | Mod: CPTII,S$GLB,, | Performed by: FAMILY MEDICINE

## 2020-07-25 ENCOUNTER — LAB VISIT (OUTPATIENT)
Dept: PRIMARY CARE CLINIC | Facility: CLINIC | Age: 35
End: 2020-07-25
Payer: COMMERCIAL

## 2020-07-25 DIAGNOSIS — Z00.6 RESEARCH EXAM: Primary | ICD-10-CM

## 2020-07-25 LAB — SARS-COV-2 IGG SERPLBLD QL IA.RAPID: NEGATIVE

## 2020-07-25 PROCEDURE — 86769 SARS-COV-2 COVID-19 ANTIBODY: CPT

## 2020-07-25 PROCEDURE — U0003 INFECTIOUS AGENT DETECTION BY NUCLEIC ACID (DNA OR RNA); SEVERE ACUTE RESPIRATORY SYNDROME CORONAVIRUS 2 (SARS-COV-2) (CORONAVIRUS DISEASE [COVID-19]), AMPLIFIED PROBE TECHNIQUE, MAKING USE OF HIGH THROUGHPUT TECHNOLOGIES AS DESCRIBED BY CMS-2020-01-R: HCPCS

## 2020-07-25 NOTE — RESEARCH
Date of Consent: 7/25/2020    Sponsor: [facility]    Study Title/IRB Number: Observational study of Sars-CoV2 Immunoglobulin G (IgG) seroprevalence among the [facility] population over time 2020.163  Principle Investigator: Randi Estrada, PhD    Did the patient need translation services? No   name: N/A    Prior to the Informed Consent (IC) being signed, or any study protocol required data collection, testing, procedure, or intervention being performed, the following was done and/or discussed:   Patient was given a paper copy of the IC for review    Patient was given study FAQ   Purpose of the study and qualifications to participate    Study design and tests or procedures done at this visit   Confidentiality and HIPAA Authorization for Release of Medical Records for the research trial/ subject's rights/research related injury   Risk, Benefits, Alternative Treatments, Compensation and Costs   Participation in the research trial is voluntary and patient may withdraw at anytime   Contact information for study related questions    Patient verbalizes understanding of the above: Yes  Contact information for PI and IRB given to patient: Yes  Patient able to adequately summarize: the purpose of the study, the risks associated with the study, and all procedures, testing, and follow-ups associated with the study: Yes    The consent was discussed verbally with the patient and all questions were answered satisfactorily. Patient gave verbal consent for the Seroprevalence research study with an IRB approval date of 06/23/2020.      The Consent, Consent Witness and name of Clinical Research Coordinator consenting was captured and documented in REDCap.    All Inclusion and Exclusion Criteria reviewed, subject meets all Inclusion criteria and does not meet any Exclusion Criteria at this time.     Patient Eligibility was confirmed.    Patient responded to survey questions.    The following biospecimen  collection procedures were collected:    -Nasopharyngeal Swab Collection  -Blood collection

## 2020-07-27 LAB — SARS-COV-2 RNA RESP QL NAA+PROBE: NOT DETECTED

## 2020-09-07 NOTE — PROGRESS NOTES
Subjective:       Patient ID: Kaden Heller is a 35 y.o. male.    Chief Complaint: Follow-up (Elevated BP)    HPIf/u bp mary nl . Better resuming exerc  Nl bps away from work    Past Medical History:   Diagnosis Date    Meniere disease 12/2019    Mixed hyperlipidemia 2/18/2019     Past Surgical History:   Procedure Laterality Date    APPENDECTOMY      HIP ARTHROSCOPY W/ LABRAL REPAIR       Family History   Problem Relation Age of Onset    Heart disease Mother     Heart attack Mother     Asthma Father     Anemia Sister     No Known Problems Daughter     No Known Problems Son     Heart attack Maternal Uncle     Heart disease Maternal Uncle     Heart attack Maternal Grandmother     Heart disease Maternal Grandmother     Heart attack Maternal Grandfather     Heart disease Maternal Grandfather      Social History     Socioeconomic History    Marital status: Single     Spouse name: Not on file    Number of children: Not on file    Years of education: Not on file    Highest education level: Not on file   Occupational History    Not on file   Social Needs    Financial resource strain: Not on file    Food insecurity     Worry: Not on file     Inability: Not on file    Transportation needs     Medical: Not on file     Non-medical: Not on file   Tobacco Use    Smoking status: Never Smoker    Smokeless tobacco: Never Used   Substance and Sexual Activity    Alcohol use: Yes     Alcohol/week: 3.0 standard drinks     Types: 2 Cans of beer, 1 Shots of liquor per week    Drug use: No    Sexual activity: Never     Birth control/protection: None   Lifestyle    Physical activity     Days per week: Not on file     Minutes per session: Not on file    Stress: Not on file   Relationships    Social connections     Talks on phone: Not on file     Gets together: Not on file     Attends Rastafari service: Not on file     Active member of club or organization: Not on file     Attends meetings of clubs or  organizations: Not on file     Relationship status: Not on file   Other Topics Concern    Not on file   Social History Narrative    Not on file       Review of Systemsno c;o cp sob      Objective:      Physical Exam  cv rrr  Assessment:       1. Blood pressure elevated without history of HTN        Plan:       **periodic bp self check*

## 2020-12-09 ENCOUNTER — CLINICAL SUPPORT (OUTPATIENT)
Dept: AUDIOLOGY | Facility: CLINIC | Age: 35
End: 2020-12-09
Payer: COMMERCIAL

## 2020-12-09 ENCOUNTER — OFFICE VISIT (OUTPATIENT)
Dept: OTOLARYNGOLOGY | Facility: CLINIC | Age: 35
End: 2020-12-09
Payer: COMMERCIAL

## 2020-12-09 ENCOUNTER — PATIENT MESSAGE (OUTPATIENT)
Dept: OTOLARYNGOLOGY | Facility: CLINIC | Age: 35
End: 2020-12-09

## 2020-12-09 VITALS
TEMPERATURE: 99 F | BODY MASS INDEX: 24.32 KG/M2 | DIASTOLIC BLOOD PRESSURE: 95 MMHG | SYSTOLIC BLOOD PRESSURE: 141 MMHG | WEIGHT: 164.69 LBS | HEART RATE: 68 BPM

## 2020-12-09 DIAGNOSIS — H90.41 SENSORINEURAL HEARING LOSS (SNHL) OF RIGHT EAR WITH UNRESTRICTED HEARING OF LEFT EAR: ICD-10-CM

## 2020-12-09 DIAGNOSIS — H81.01 MENIERE'S DISEASE, RIGHT EAR: Primary | ICD-10-CM

## 2020-12-09 DIAGNOSIS — H81.01 MENIERE DISEASE, RIGHT: Primary | ICD-10-CM

## 2020-12-09 DIAGNOSIS — H93.11 TINNITUS OF RIGHT EAR: ICD-10-CM

## 2020-12-09 PROCEDURE — 92557 PR COMPREHENSIVE HEARING TEST: ICD-10-PCS | Mod: S$GLB,,, | Performed by: AUDIOLOGIST-HEARING AID FITTER

## 2020-12-09 PROCEDURE — 99999 PR PBB SHADOW E&M-EST. PATIENT-LVL III: CPT | Mod: PBBFAC,,, | Performed by: PHYSICIAN ASSISTANT

## 2020-12-09 PROCEDURE — 99213 PR OFFICE/OUTPT VISIT, EST, LEVL III, 20-29 MIN: ICD-10-PCS | Mod: S$GLB,,, | Performed by: PHYSICIAN ASSISTANT

## 2020-12-09 PROCEDURE — 92567 TYMPANOMETRY: CPT | Mod: S$GLB,,, | Performed by: AUDIOLOGIST-HEARING AID FITTER

## 2020-12-09 PROCEDURE — 99213 OFFICE O/P EST LOW 20 MIN: CPT | Mod: S$GLB,,, | Performed by: PHYSICIAN ASSISTANT

## 2020-12-09 PROCEDURE — 1125F AMNT PAIN NOTED PAIN PRSNT: CPT | Mod: S$GLB,,, | Performed by: PHYSICIAN ASSISTANT

## 2020-12-09 PROCEDURE — 3008F PR BODY MASS INDEX (BMI) DOCUMENTED: ICD-10-PCS | Mod: CPTII,S$GLB,, | Performed by: PHYSICIAN ASSISTANT

## 2020-12-09 PROCEDURE — 99999 PR PBB SHADOW E&M-EST. PATIENT-LVL III: ICD-10-PCS | Mod: PBBFAC,,, | Performed by: PHYSICIAN ASSISTANT

## 2020-12-09 PROCEDURE — 92567 PR TYMPA2METRY: ICD-10-PCS | Mod: S$GLB,,, | Performed by: AUDIOLOGIST-HEARING AID FITTER

## 2020-12-09 PROCEDURE — 3008F BODY MASS INDEX DOCD: CPT | Mod: CPTII,S$GLB,, | Performed by: PHYSICIAN ASSISTANT

## 2020-12-09 PROCEDURE — 1125F PR PAIN SEVERITY QUANTIFIED, PAIN PRESENT: ICD-10-PCS | Mod: S$GLB,,, | Performed by: PHYSICIAN ASSISTANT

## 2020-12-09 PROCEDURE — 92557 COMPREHENSIVE HEARING TEST: CPT | Mod: S$GLB,,, | Performed by: AUDIOLOGIST-HEARING AID FITTER

## 2020-12-09 RX ORDER — IBUPROFEN 400 MG/1
400 TABLET ORAL EVERY 4 HOURS
COMMUNITY
End: 2022-02-25 | Stop reason: CLARIF

## 2020-12-09 RX ORDER — PREDNISONE 20 MG/1
TABLET ORAL
Qty: 32 TABLET | Refills: 0 | Status: SHIPPED | OUTPATIENT
Start: 2020-12-09 | End: 2022-01-21

## 2020-12-09 NOTE — PROGRESS NOTES
"Referring Provider:Janet Bailey PA-C    Kaden Heller was seen 12/09/2020 for an audiological evaluation.  Patient complains of worsening pressure and tinnitus AD over past 2 months.  He was diagnosed with Meniere's (RIGHT) in 3/2019.  His EcoG was abnormal on the right and his VNG showed right caloric weakness as well.  He also had low-frequency HL on the RIGHT that resolved the next day.  He started oral Dyazide in 2/2020 and says he takes it "most days" unless he forgets.  He's had negative MRI IAC in 4/2019.  He reports recent issues with anxiety since COVID pandemic; says he's not exercising or running as much.  His weight has fluctuated 14# gain and now down 10 pounds since start of pandemic.  He says he adheres to low-salt diet and limits his caffeine intake.  He is most bothered now by constant tinnitus AD and aural fullness AD.  He says he's been hyper-emotional and has had issues with short-term memory recently.  He denies dizziness or vertigo; denies unsteadiness or issues with his balance.  He says the aural pressure and tinnitus AD gets quite intense and distracting, making it hard to concentrate; at times he's tearful.  He feels as though his hearing is diminished 50% since his last audiogram last year.  He also complains of occasional bloating over past 2 months; also itching of his posterior neck (may last 1-2 days, no rash or lesions) recurrent over 2 months.  He also sees "white speckles" when he shuts his eyes at night.  Denies headaches or photophobia.  He says that a co-worker mentioned an expert in Meniere's at [facility] and he's requesting another course of oral steroids.  He has also been researching Meniere's and different treatments and is interested in "pressure therapy" that he read about online.    Results reveal a mild-to-moderate sensorineural hearing loss 250-8000 Hz for the right ear, and normal hearing from 250-8000 Hz for the left ear.   Speech Reception Thresholds " were  35 dBHL for the right ear and 15 dBHL for the left ear.   Word recognition scores were excellent for the right ear and excellent for the left ear.   Tympanograms were Type A, normal for the right ear and Type A, normal for the left ear.    Patient was counseled on the above findings.    Recommendations:  1. ENT review

## 2020-12-09 NOTE — PROGRESS NOTES
"Subjective:       Patient ID: Kaden Heller is a 35 y.o. male.    Chief Complaint: Other (Meniere's Disease)    Patient is a very pleasant 35 year old gentleman here to see me today for evaluation of worsening pressure and tinnitus AD over past 2 months.  He was diagnosed with Meniere's (RIGHT) in 3/2019.  His EcoG was abnormal on the right and his VNG showed right caloric weakness as well.  He also had low-frequency HL on the RIGHT that resolved the next day.  He started oral Dyazide in 2/2020 and says he takes it "most days" unless he forgets.  He's had negative MRI IAC in 4/2019.  He reports recent issues with anxiety since COVID pandemic; says he's not exercising or running as much.  His weight has fluctuated 14# gain and now down 10 pounds since start of pandemic.  He says he adheres to low-salt diet and limits his caffeine intake.  He is most bothered now by constant tinnitus AD and aural fullness AD.  He says he's been hyper-emotional and has had issues with short-term memory recently.  He denies dizziness or vertigo; denies unsteadiness or issues with his balance.  He says the aural pressure and tinnitus AD gets quite intense and distracting, making it hard to concentrate; at times he's tearful.  He feels as though his hearing is diminished 50% since his last audiogram last year.  He also complains of occasional bloating over past 2 months; also itching of his posterior neck (may last 1-2 days, no rash or lesions) recurrent over 2 months.  He also sees "white speckles" when he shuts his eyes at night.  Denies headaches or photophobia.  He says that a co-worker mentioned an expert in Meniere's at [facility] and he's requesting another course of oral steroids.  He has also been researching Meniere's and different treatments and is interested in "pressure therapy" that he read about online.    Review of Systems   Constitutional: Positive for fatigue. Negative for fever.   HENT: Positive for hearing " loss and tinnitus (AD). Negative for ear discharge and ear pain (AD pressure).    Eyes: Negative for photophobia.   Gastrointestinal: Positive for abdominal distention (bloating).   Neurological: Positive for memory loss (short term memory loss). Negative for dizziness and headaches.   Psychiatric/Behavioral: The patient is nervous/anxious (anxiety).         Hyper-emotional and tearful at times         Objective:      Physical Exam  Vitals signs reviewed.   Constitutional:       Appearance: He is well-developed. He is not ill-appearing.   HENT:      Head: Normocephalic and atraumatic.      Jaw: No trismus.      Right Ear: Tympanic membrane, ear canal and external ear normal. No drainage. No middle ear effusion. Tympanic membrane is not injected or erythematous.      Left Ear: Tympanic membrane, ear canal and external ear normal. No drainage.  No middle ear effusion. Tympanic membrane is not injected or erythematous.      Nose: No nasal deformity, septal deviation, mucosal edema or rhinorrhea.      Right Sinus: No maxillary sinus tenderness or frontal sinus tenderness.      Left Sinus: No maxillary sinus tenderness or frontal sinus tenderness.      Mouth/Throat:      Dentition: Normal dentition.      Pharynx: Uvula midline. No oropharyngeal exudate or uvula swelling.      Tonsils: 2+ on the right. 2+ on the left.   Eyes:      General: No scleral icterus.     Conjunctiva/sclera: Conjunctivae normal.      Right eye: Right conjunctiva is not injected. No chemosis.     Left eye: Left conjunctiva is not injected. No chemosis.     Pupils: Pupils are equal, round, and reactive to light.   Neck:      Thyroid: No thyroid mass or thyromegaly.      Trachea: Trachea and phonation normal. No tracheal tenderness or tracheal deviation.   Pulmonary:      Effort: Pulmonary effort is normal. No tachypnea, accessory muscle usage or respiratory distress.      Breath sounds: No stridor.   Lymphadenopathy:      Head:      Right side of  head: No submental, submandibular, preauricular or posterior auricular adenopathy.      Left side of head: No submental, submandibular, preauricular or posterior auricular adenopathy.      Cervical: No cervical adenopathy.      Right cervical: No superficial or deep cervical adenopathy.     Left cervical: No superficial or deep cervical adenopathy.   Skin:     General: Skin is warm and dry.      Findings: No erythema or rash.   Neurological:      Mental Status: He is alert and oriented to person, place, and time.      Cranial Nerves: No cranial nerve deficit.   Psychiatric:         Behavior: Behavior normal. Behavior is cooperative.         Thought Content: Thought content normal.         Assessment:       1. Meniere disease, right    2. 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  We discussed the relevant pathology of hydrops, and that it is caused by excess endolymphatic fluid in the inner ear.  Hydrops was previously classified as Meniere's disease in which the classic constellation of symptoms included dizziness, fullness, tinnitus, and fluctuating hearing loss.  However, as more research is being done, it is now accepted that a patient with hydrops can have one, some, or all of those symptoms.  He reports adherence to the hydrops diet, which is a low sodium diet. He has been on oral dyazide (almost daily) since 2/2020 and now feels as though his symptoms are persistent and worsening.  I recommend a referral to Dr. Anna at [facility] [place], as there are also other treatment modalities, including surgery, available.  He mentioned another specialist at [facility] and I'm happy to place referral there if he prefers once he's able to identify the provider.  Will also discussed his case with Dr. Chery (? Increase Dyazide dose; ? Pressure therapy).     Discussed with him that unfortunately  steroids aren't usually the treatment for an acute flare of Meniere's.  I recommend trial of Meclizine or Antivert (vestibular suppressants) for acute flare and consider something for nausea if needed.  He says the dizziness is not the issue and it's more the aural pressure and tinnitus.      I recommend audiogram for further evaluation and will review those results once available.  Will compare to audiogram 3/2019.    As for his other complaints (bloating, itching, worsening anxiety and emotional lability), I recommend he reach out to PCP for further evaluation.

## 2020-12-23 ENCOUNTER — IMMUNIZATION (OUTPATIENT)
Dept: INTERNAL MEDICINE | Facility: CLINIC | Age: 35
End: 2020-12-23

## 2020-12-23 DIAGNOSIS — Z23 NEED FOR VACCINATION: ICD-10-CM

## 2020-12-23 PROCEDURE — 91300 COVID-19, MRNA, LNP-S, PF, 30 MCG/0.3 ML DOSE VACCINE: ICD-10-PCS | Mod: S$GLB,,, | Performed by: FAMILY MEDICINE

## 2020-12-23 PROCEDURE — 0001A COVID-19, MRNA, LNP-S, PF, 30 MCG/0.3 ML DOSE VACCINE: CPT | Mod: CV19,S$GLB,, | Performed by: FAMILY MEDICINE

## 2020-12-23 PROCEDURE — 91300 COVID-19, MRNA, LNP-S, PF, 30 MCG/0.3 ML DOSE VACCINE: CPT | Mod: S$GLB,,, | Performed by: FAMILY MEDICINE

## 2020-12-23 PROCEDURE — 0001A COVID-19, MRNA, LNP-S, PF, 30 MCG/0.3 ML DOSE VACCINE: ICD-10-PCS | Mod: CV19,S$GLB,, | Performed by: FAMILY MEDICINE

## 2021-01-07 ENCOUNTER — OFFICE VISIT (OUTPATIENT)
Dept: OTOLARYNGOLOGY | Facility: CLINIC | Age: 36
End: 2021-01-07
Payer: COMMERCIAL

## 2021-01-07 ENCOUNTER — CLINICAL SUPPORT (OUTPATIENT)
Dept: AUDIOLOGY | Facility: CLINIC | Age: 36
End: 2021-01-07
Payer: COMMERCIAL

## 2021-01-07 VITALS
SYSTOLIC BLOOD PRESSURE: 139 MMHG | WEIGHT: 164.25 LBS | HEART RATE: 81 BPM | DIASTOLIC BLOOD PRESSURE: 89 MMHG | BODY MASS INDEX: 24.25 KG/M2 | TEMPERATURE: 99 F

## 2021-01-07 DIAGNOSIS — H90.41 SENSORINEURAL HEARING LOSS (SNHL) OF RIGHT EAR WITH UNRESTRICTED HEARING OF LEFT EAR: Primary | ICD-10-CM

## 2021-01-07 DIAGNOSIS — H81.01 MENIERE DISEASE, RIGHT: ICD-10-CM

## 2021-01-07 DIAGNOSIS — H81.01 MENIERE'S DISEASE, RIGHT: ICD-10-CM

## 2021-01-07 DIAGNOSIS — H93.11 TINNITUS, RIGHT EAR: ICD-10-CM

## 2021-01-07 DIAGNOSIS — F41.9 ANXIETY: ICD-10-CM

## 2021-01-07 DIAGNOSIS — H93.8X1 EAR PRESSURE, RIGHT: ICD-10-CM

## 2021-01-07 DIAGNOSIS — H93.11 TINNITUS OF RIGHT EAR: Primary | ICD-10-CM

## 2021-01-07 PROCEDURE — 3008F BODY MASS INDEX DOCD: CPT | Mod: CPTII,S$GLB,, | Performed by: OTOLARYNGOLOGY

## 2021-01-07 PROCEDURE — 99214 PR OFFICE/OUTPT VISIT, EST, LEVL IV, 30-39 MIN: ICD-10-PCS | Mod: S$GLB,,, | Performed by: OTOLARYNGOLOGY

## 2021-01-07 PROCEDURE — 92567 PR TYMPA2METRY: ICD-10-PCS | Mod: S$GLB,,, | Performed by: AUDIOLOGIST-HEARING AID FITTER

## 2021-01-07 PROCEDURE — 92567 TYMPANOMETRY: CPT | Mod: S$GLB,,, | Performed by: AUDIOLOGIST-HEARING AID FITTER

## 2021-01-07 PROCEDURE — 99999 PR PBB SHADOW E&M-EST. PATIENT-LVL III: CPT | Mod: PBBFAC,,, | Performed by: OTOLARYNGOLOGY

## 2021-01-07 PROCEDURE — 1125F PR PAIN SEVERITY QUANTIFIED, PAIN PRESENT: ICD-10-PCS | Mod: S$GLB,,, | Performed by: OTOLARYNGOLOGY

## 2021-01-07 PROCEDURE — 99214 OFFICE O/P EST MOD 30 MIN: CPT | Mod: S$GLB,,, | Performed by: OTOLARYNGOLOGY

## 2021-01-07 PROCEDURE — 92553 PR AUDIOMETRY, AIR & BONE: ICD-10-PCS | Mod: S$GLB,,, | Performed by: AUDIOLOGIST-HEARING AID FITTER

## 2021-01-07 PROCEDURE — 92553 AUDIOMETRY AIR & BONE: CPT | Mod: S$GLB,,, | Performed by: AUDIOLOGIST-HEARING AID FITTER

## 2021-01-07 PROCEDURE — 1125F AMNT PAIN NOTED PAIN PRSNT: CPT | Mod: S$GLB,,, | Performed by: OTOLARYNGOLOGY

## 2021-01-07 PROCEDURE — 99999 PR PBB SHADOW E&M-EST. PATIENT-LVL III: ICD-10-PCS | Mod: PBBFAC,,, | Performed by: OTOLARYNGOLOGY

## 2021-01-07 PROCEDURE — 3008F PR BODY MASS INDEX (BMI) DOCUMENTED: ICD-10-PCS | Mod: CPTII,S$GLB,, | Performed by: OTOLARYNGOLOGY

## 2021-01-13 ENCOUNTER — IMMUNIZATION (OUTPATIENT)
Dept: INTERNAL MEDICINE | Facility: CLINIC | Age: 36
End: 2021-01-13
Payer: COMMERCIAL

## 2021-01-13 DIAGNOSIS — Z23 NEED FOR VACCINATION: ICD-10-CM

## 2021-01-13 PROCEDURE — 0002A COVID-19, MRNA, LNP-S, PF, 30 MCG/0.3 ML DOSE VACCINE: CPT | Mod: PBBFAC | Performed by: FAMILY MEDICINE

## 2021-01-13 PROCEDURE — 91300 COVID-19, MRNA, LNP-S, PF, 30 MCG/0.3 ML DOSE VACCINE: CPT | Mod: PBBFAC | Performed by: FAMILY MEDICINE

## 2021-04-21 ENCOUNTER — PATIENT MESSAGE (OUTPATIENT)
Dept: OTOLARYNGOLOGY | Facility: CLINIC | Age: 36
End: 2021-04-21

## 2021-04-21 ENCOUNTER — LAB VISIT (OUTPATIENT)
Dept: LAB | Facility: HOSPITAL | Age: 36
End: 2021-04-21
Attending: PHYSICIAN ASSISTANT
Payer: COMMERCIAL

## 2021-04-21 DIAGNOSIS — H81.01 MENIERE DISEASE, RIGHT: Primary | ICD-10-CM

## 2021-04-21 DIAGNOSIS — H81.01 MENIERE DISEASE, RIGHT: ICD-10-CM

## 2021-04-21 PROCEDURE — 36415 COLL VENOUS BLD VENIPUNCTURE: CPT | Performed by: PHYSICIAN ASSISTANT

## 2021-04-21 PROCEDURE — 80048 BASIC METABOLIC PNL TOTAL CA: CPT | Performed by: PHYSICIAN ASSISTANT

## 2021-04-21 RX ORDER — TRIAMTERENE AND HYDROCHLOROTHIAZIDE 37.5; 25 MG/1; MG/1
1 CAPSULE ORAL EVERY MORNING
Qty: 30 CAPSULE | Refills: 11 | OUTPATIENT
Start: 2021-04-21 | End: 2022-04-21

## 2021-04-22 LAB
ANION GAP SERPL CALC-SCNC: 12 MMOL/L (ref 8–16)
BUN SERPL-MCNC: 12 MG/DL (ref 6–20)
CALCIUM SERPL-MCNC: 9.3 MG/DL (ref 8.7–10.5)
CHLORIDE SERPL-SCNC: 102 MMOL/L (ref 95–110)
CO2 SERPL-SCNC: 27 MMOL/L (ref 23–29)
CREAT SERPL-MCNC: 0.9 MG/DL (ref 0.5–1.4)
EST. GFR  (AFRICAN AMERICAN): >60 ML/MIN/1.73 M^2
EST. GFR  (NON AFRICAN AMERICAN): >60 ML/MIN/1.73 M^2
GLUCOSE SERPL-MCNC: 118 MG/DL (ref 70–110)
POTASSIUM SERPL-SCNC: 4.1 MMOL/L (ref 3.5–5.1)
SODIUM SERPL-SCNC: 141 MMOL/L (ref 136–145)

## 2021-10-14 ENCOUNTER — IMMUNIZATION (OUTPATIENT)
Dept: INTERNAL MEDICINE | Facility: CLINIC | Age: 36
End: 2021-10-14
Payer: COMMERCIAL

## 2021-10-14 DIAGNOSIS — Z23 NEED FOR VACCINATION: Primary | ICD-10-CM

## 2021-10-14 PROCEDURE — 91300 COVID-19, MRNA, LNP-S, PF, 30 MCG/0.3 ML DOSE VACCINE: CPT | Mod: PBBFAC | Performed by: INTERNAL MEDICINE

## 2021-10-14 PROCEDURE — 0003A COVID-19, MRNA, LNP-S, PF, 30 MCG/0.3 ML DOSE VACCINE: CPT | Mod: CV19,PBBFAC | Performed by: INTERNAL MEDICINE

## 2021-12-27 ENCOUNTER — LAB VISIT (OUTPATIENT)
Dept: PRIMARY CARE CLINIC | Facility: OTHER | Age: 36
End: 2021-12-27

## 2021-12-27 DIAGNOSIS — R05.9 COUGH: Primary | ICD-10-CM

## 2021-12-27 LAB
CTP QC/QA: YES
SARS-COV-2 AG RESP QL IA.RAPID: POSITIVE

## 2021-12-28 ENCOUNTER — PATIENT MESSAGE (OUTPATIENT)
Dept: ADMINISTRATIVE | Facility: OTHER | Age: 36
End: 2021-12-28
Payer: COMMERCIAL

## 2021-12-29 ENCOUNTER — PATIENT MESSAGE (OUTPATIENT)
Dept: INTERNAL MEDICINE | Facility: CLINIC | Age: 36
End: 2021-12-29
Payer: COMMERCIAL

## 2022-01-21 ENCOUNTER — OFFICE VISIT (OUTPATIENT)
Dept: INTERNAL MEDICINE | Facility: CLINIC | Age: 37
End: 2022-01-21
Payer: COMMERCIAL

## 2022-01-21 VITALS — OXYGEN SATURATION: 98 % | HEART RATE: 86 BPM | DIASTOLIC BLOOD PRESSURE: 88 MMHG | SYSTOLIC BLOOD PRESSURE: 138 MMHG

## 2022-01-21 DIAGNOSIS — H81.09 MENIERE'S DISEASE, UNSPECIFIED LATERALITY: Primary | ICD-10-CM

## 2022-01-21 DIAGNOSIS — B96.89 ACUTE BACTERIAL SINUSITIS: ICD-10-CM

## 2022-01-21 DIAGNOSIS — J01.90 ACUTE BACTERIAL SINUSITIS: ICD-10-CM

## 2022-01-21 PROCEDURE — 99214 PR OFFICE/OUTPT VISIT, EST, LEVL IV, 30-39 MIN: ICD-10-PCS | Mod: S$GLB,,, | Performed by: INTERNAL MEDICINE

## 2022-01-21 PROCEDURE — 3075F SYST BP GE 130 - 139MM HG: CPT | Mod: CPTII,S$GLB,, | Performed by: INTERNAL MEDICINE

## 2022-01-21 PROCEDURE — 99999 PR PBB SHADOW E&M-EST. PATIENT-LVL III: CPT | Mod: PBBFAC,,, | Performed by: INTERNAL MEDICINE

## 2022-01-21 PROCEDURE — 99214 OFFICE O/P EST MOD 30 MIN: CPT | Mod: S$GLB,,, | Performed by: INTERNAL MEDICINE

## 2022-01-21 PROCEDURE — 99999 PR PBB SHADOW E&M-EST. PATIENT-LVL III: ICD-10-PCS | Mod: PBBFAC,,, | Performed by: INTERNAL MEDICINE

## 2022-01-21 PROCEDURE — 3079F DIAST BP 80-89 MM HG: CPT | Mod: CPTII,S$GLB,, | Performed by: INTERNAL MEDICINE

## 2022-01-21 PROCEDURE — 3075F PR MOST RECENT SYSTOLIC BLOOD PRESS GE 130-139MM HG: ICD-10-PCS | Mod: CPTII,S$GLB,, | Performed by: INTERNAL MEDICINE

## 2022-01-21 PROCEDURE — 3079F PR MOST RECENT DIASTOLIC BLOOD PRESSURE 80-89 MM HG: ICD-10-PCS | Mod: CPTII,S$GLB,, | Performed by: INTERNAL MEDICINE

## 2022-01-21 RX ORDER — DOXYCYCLINE HYCLATE 100 MG
100 TABLET ORAL 2 TIMES DAILY
Qty: 20 TABLET | Refills: 0 | Status: SHIPPED | OUTPATIENT
Start: 2022-01-21 | End: 2022-01-31

## 2022-01-21 RX ORDER — PREDNISONE 10 MG/1
TABLET ORAL
Qty: 32 TABLET | Refills: 0 | Status: SHIPPED | OUTPATIENT
Start: 2022-01-21 | End: 2022-02-25

## 2022-01-21 NOTE — PROGRESS NOTES
Chief Complaint: Cough    HPI: this is a 36 year old man who presents due to sinus congestion and cough. He tested positive for covid on 12/27/21. He finished his 5 day quarantine on 12/31/21. His cough has never resolved. He has sinus congestion with clear rhinorrhea but a cough with yellow mucous. COugh is coming from chest. He has also had right ear pain.  He has also had a sore throat.  No shortness of breath, chest pain, nausea, vomiting, diarrhea    He has a history of Meniere's disease for the last 3 years. COVID amplified the ringing in his right ear x 2.    Past Medical History:   Diagnosis Date    Meniere disease 12/2019    Mixed hyperlipidemia 2/18/2019     Past Surgical History:   Procedure Laterality Date    APPENDECTOMY      HIP ARTHROSCOPY W/ LABRAL REPAIR       Social History     Socioeconomic History    Marital status: Single   Tobacco Use    Smoking status: Never Smoker    Smokeless tobacco: Never Used   Substance and Sexual Activity    Alcohol use: Yes     Alcohol/week: 3.0 standard drinks     Types: 2 Cans of beer, 1 Shots of liquor per week    Drug use: No    Sexual activity: Never     Birth control/protection: None             Meds and allergies: updated on Epic    Physical exam:    General: alert, oriented x 3, no apparent distress.  Affect normal  HEENT: Conjunctivae: anicteric, PERRL, EOMI, TM red fluid - right greater than left. , Oralpharynx clear  Neck: supple, no thyroid enlargement, no cervical lymphadenopathy  Resp: effort normal, lungs clear bilaterally  CV: Regular rate and rhythm without murmurs, gallops or rubs, no lower extremity edema,     Assessment/Plan:  Bacterial sinusitis and bronchitis after COVID - doxycycline 100 mg twic edialy for 10 days  Meniere's disease flare - prednisone taper. To ENT with audiogram prior.    Let me know if no improvement or worsen.

## 2022-02-23 ENCOUNTER — CLINICAL SUPPORT (OUTPATIENT)
Dept: AUDIOLOGY | Facility: CLINIC | Age: 37
End: 2022-02-23
Payer: COMMERCIAL

## 2022-02-23 DIAGNOSIS — H81.09 MENIERE'S DISEASE, UNSPECIFIED LATERALITY: ICD-10-CM

## 2022-02-23 DIAGNOSIS — H90.41 SENSORINEURAL HEARING LOSS (SNHL) OF RIGHT EAR WITH UNRESTRICTED HEARING OF LEFT EAR: Primary | ICD-10-CM

## 2022-02-23 PROCEDURE — 92567 PR TYMPA2METRY: ICD-10-PCS | Mod: S$GLB,,, | Performed by: AUDIOLOGIST

## 2022-02-23 PROCEDURE — 92567 TYMPANOMETRY: CPT | Mod: S$GLB,,, | Performed by: AUDIOLOGIST

## 2022-02-23 PROCEDURE — 92557 COMPREHENSIVE HEARING TEST: CPT | Mod: S$GLB,,, | Performed by: AUDIOLOGIST

## 2022-02-23 PROCEDURE — 99999 PR PBB SHADOW E&M-EST. PATIENT-LVL I: CPT | Mod: PBBFAC,,, | Performed by: AUDIOLOGIST

## 2022-02-23 PROCEDURE — 99999 PR PBB SHADOW E&M-EST. PATIENT-LVL I: ICD-10-PCS | Mod: PBBFAC,,, | Performed by: AUDIOLOGIST

## 2022-02-23 PROCEDURE — 92557 PR COMPREHENSIVE HEARING TEST: ICD-10-PCS | Mod: S$GLB,,, | Performed by: AUDIOLOGIST

## 2022-02-23 NOTE — PROGRESS NOTES
Kaden Heller was seen today for a hearing evaluation. Kaden reports a decreased in hearing from both ears within the last year and reports the tinnitus has increased in intensity in the right ear.     Pure tone audiometry revealed a mild-moderate SNHL for the right ear; normal hearing for the left ear  Speech Reception Threshold (SRT) and Pure Tone Average (PTA) are in Good agreement bilaterally. Indicating good test/re-test reliability   Excellent speech discrimination for the right ear: Excellent  speech discrimination for the left ear   Tympanometry revealed Type A for the right ear, Type A for the left ear    Recommendations:  1. Otologic Evaluation  2. Repeat audiogram as needed  3. Hearing Protection  4. Hearing Aid Consult

## 2022-02-25 ENCOUNTER — OFFICE VISIT (OUTPATIENT)
Dept: OTOLARYNGOLOGY | Facility: CLINIC | Age: 37
End: 2022-02-25
Payer: COMMERCIAL

## 2022-02-25 VITALS
WEIGHT: 165.81 LBS | BODY MASS INDEX: 24.48 KG/M2 | TEMPERATURE: 98 F | HEART RATE: 68 BPM | DIASTOLIC BLOOD PRESSURE: 97 MMHG | SYSTOLIC BLOOD PRESSURE: 155 MMHG

## 2022-02-25 DIAGNOSIS — H93.11 TINNITUS OF RIGHT EAR: ICD-10-CM

## 2022-02-25 DIAGNOSIS — H81.09 MENIERE'S DISEASE, UNSPECIFIED LATERALITY: ICD-10-CM

## 2022-02-25 DIAGNOSIS — H90.41 SENSORINEURAL HEARING LOSS (SNHL) OF RIGHT EAR WITH UNRESTRICTED HEARING OF LEFT EAR: Primary | ICD-10-CM

## 2022-02-25 PROCEDURE — 1159F MED LIST DOCD IN RCRD: CPT | Mod: CPTII,S$GLB,, | Performed by: SPECIALIST

## 2022-02-25 PROCEDURE — 1160F PR REVIEW ALL MEDS BY PRESCRIBER/CLIN PHARMACIST DOCUMENTED: ICD-10-PCS | Mod: CPTII,S$GLB,, | Performed by: SPECIALIST

## 2022-02-25 PROCEDURE — 3080F DIAST BP >= 90 MM HG: CPT | Mod: CPTII,S$GLB,, | Performed by: SPECIALIST

## 2022-02-25 PROCEDURE — 1160F RVW MEDS BY RX/DR IN RCRD: CPT | Mod: CPTII,S$GLB,, | Performed by: SPECIALIST

## 2022-02-25 PROCEDURE — 3008F BODY MASS INDEX DOCD: CPT | Mod: CPTII,S$GLB,, | Performed by: SPECIALIST

## 2022-02-25 PROCEDURE — 3008F PR BODY MASS INDEX (BMI) DOCUMENTED: ICD-10-PCS | Mod: CPTII,S$GLB,, | Performed by: SPECIALIST

## 2022-02-25 PROCEDURE — 3080F PR MOST RECENT DIASTOLIC BLOOD PRESSURE >= 90 MM HG: ICD-10-PCS | Mod: CPTII,S$GLB,, | Performed by: SPECIALIST

## 2022-02-25 PROCEDURE — 1159F PR MEDICATION LIST DOCUMENTED IN MEDICAL RECORD: ICD-10-PCS | Mod: CPTII,S$GLB,, | Performed by: SPECIALIST

## 2022-02-25 PROCEDURE — 99999 PR PBB SHADOW E&M-EST. PATIENT-LVL IV: CPT | Mod: PBBFAC,,, | Performed by: SPECIALIST

## 2022-02-25 PROCEDURE — 3077F SYST BP >= 140 MM HG: CPT | Mod: CPTII,S$GLB,, | Performed by: SPECIALIST

## 2022-02-25 PROCEDURE — 3077F PR MOST RECENT SYSTOLIC BLOOD PRESSURE >= 140 MM HG: ICD-10-PCS | Mod: CPTII,S$GLB,, | Performed by: SPECIALIST

## 2022-02-25 PROCEDURE — 99214 OFFICE O/P EST MOD 30 MIN: CPT | Mod: S$GLB,,, | Performed by: SPECIALIST

## 2022-02-25 PROCEDURE — 99999 PR PBB SHADOW E&M-EST. PATIENT-LVL IV: ICD-10-PCS | Mod: PBBFAC,,, | Performed by: SPECIALIST

## 2022-02-25 PROCEDURE — 99214 PR OFFICE/OUTPT VISIT, EST, LEVL IV, 30-39 MIN: ICD-10-PCS | Mod: S$GLB,,, | Performed by: SPECIALIST

## 2022-02-25 RX ORDER — TRIAMTERENE AND HYDROCHLOROTHIAZIDE 37.5; 25 MG/1; MG/1
1 CAPSULE ORAL EVERY MORNING
Qty: 30 CAPSULE | Refills: 11 | Status: SHIPPED | OUTPATIENT
Start: 2022-02-25 | End: 2023-10-31 | Stop reason: SDUPTHER

## 2022-02-25 RX ORDER — TRIAMTERENE AND HYDROCHLOROTHIAZIDE 37.5; 25 MG/1; MG/1
1 CAPSULE ORAL EVERY MORNING
Qty: 30 CAPSULE | Refills: 11 | Status: SHIPPED | OUTPATIENT
Start: 2022-02-25 | End: 2022-02-25 | Stop reason: CLARIF

## 2022-02-25 RX ORDER — MECLIZINE HCL 12.5 MG 12.5 MG/1
12.5 TABLET ORAL 2 TIMES DAILY PRN
Qty: 30 TABLET | Refills: 11 | Status: SHIPPED | OUTPATIENT
Start: 2022-02-25 | End: 2023-10-31 | Stop reason: SDUPTHER

## 2022-02-25 RX ORDER — MECLIZINE HCL 12.5 MG 12.5 MG/1
12.5 TABLET ORAL 2 TIMES DAILY PRN
Qty: 30 TABLET | Refills: 11 | Status: SHIPPED | OUTPATIENT
Start: 2022-02-25 | End: 2022-02-25 | Stop reason: CLARIF

## 2022-02-25 NOTE — PROGRESS NOTES
Subjective:       Patient ID: Kaden Heller is a 36 y.o. male.    Chief Complaint: Follow-up (With ear and hearing loss), Cough, nasal drip, and Tinnitus    The patient is referred by Dr. Tania Upton for evaluation of a chronic right ear problem.  In March 2019 he had an episode of vertigo.  Shortly thereafter he developed pressure and tinnitus in his right ear that are present all day and every day.  He initially had a hearing loud loss that improved and has now returned with slow progressive worsening.  The tinnitus and pressure very in severity but constant since Thanksgiving of 2020. .  They seem to worsen as the day goes along.  They seem to worsen if he has a high sodium intake.  In April 2019 he had an MRI which did not display any retrocochlear lesions or brain abnormalities.  One month ago he was treated for acute sinusitis and acute otitis media with doxycycline and a prednisone taper.  Ear symptoms worsen acutely with the illness and have return to the baseline level.  He has had 2 episodes where he notice black floaters in the last 3 months.    Review of Systems   Constitutional: Negative.  Negative for activity change, appetite change, chills, fatigue, fever and unexpected weight change.   HENT: Positive for nasal congestion, ear pain (, acute otitis media/ear infections), hearing loss, postnasal drip, sinus pressure/congestion, sore throat and tinnitus. Negative for ear discharge, facial swelling, mouth sores, rhinorrhea, sneezing, trouble swallowing and voice change.    Eyes: Negative.  Negative for photophobia, pain, discharge, redness, itching and visual disturbance.        Floaters   Respiratory: Positive for cough and wheezing. Negative for apnea, choking and shortness of breath.    Cardiovascular: Positive for chest pain. Negative for palpitations.   Gastrointestinal: Positive for diarrhea. Negative for abdominal distention, abdominal pain, nausea and vomiting.   Endocrine: Negative.     Genitourinary: Negative.    Musculoskeletal: Negative.  Negative for arthralgias, myalgias, neck pain and neck stiffness.   Integumentary:  Negative for color change, pallor and rash. Negative.   Allergic/Immunologic: Positive for environmental allergies (Seasonal allergies). Negative for food allergies.   Neurological: Positive for dizziness, light-headedness and headaches. Negative for facial asymmetry, speech difficulty, weakness and numbness.   Hematological: Negative.  Negative for adenopathy. Does not bruise/bleed easily.   Psychiatric/Behavioral: Negative for agitation, confusion, decreased concentration and sleep disturbance. The patient is nervous/anxious.          Objective:      Physical Exam  Vitals and nursing note reviewed.   Constitutional:       General: He is awake.      Appearance: Normal appearance. He is well-developed, well-groomed and normal weight.   HENT:      Head: Normocephalic.      Jaw: There is normal jaw occlusion.      Salivary Glands: Right salivary gland is not diffusely enlarged. Left salivary gland is not diffusely enlarged.      Right Ear: Hearing, ear canal and external ear normal. Tympanic membrane is retracted. Tympanic membrane has decreased mobility (Non mobile on pneumatic otoscopy).      Left Ear: Hearing, ear canal and external ear normal. Tympanic membrane is retracted. Tympanic membrane has decreased mobility ( decreased mobility on pneumatic otoscopy).      Nose: Septal deviation ( to the right), mucosal edema (cyanotic, boggy inferior turbinates bilaterally) and rhinorrhea (clear mucus bilaterally) present. No nasal deformity. Rhinorrhea is clear.      Right Turbinates: Enlarged and pale.      Left Turbinates: Enlarged and pale.      Mouth/Throat:      Lips: No lesions.      Mouth: No oral lesions.      Dentition: No gum lesions.      Tongue: No lesions.      Palate: No mass and lesions.      Pharynx: Oropharynx is clear. Uvula midline.      Tonsils: No tonsillar  exudate. 2+ on the right. 2+ on the left.   Eyes:      General: Lids are normal.         Right eye: No discharge.         Left eye: No discharge.      Conjunctiva/sclera:      Right eye: Right conjunctiva is injected. No exudate.     Left eye: Left conjunctiva is injected. No exudate.     Pupils: Pupils are equal, round, and reactive to light.   Neck:      Thyroid: No thyroid mass or thyromegaly.      Vascular: No carotid bruit.      Trachea: Trachea normal. No tracheal deviation.   Cardiovascular:      Rate and Rhythm: Normal rate and regular rhythm.      Pulses: Normal pulses.      Heart sounds: Normal heart sounds.   Pulmonary:      Effort: Pulmonary effort is normal.      Breath sounds: Normal breath sounds. No stridor. No decreased breath sounds, wheezing, rhonchi or rales.   Abdominal:      General: Bowel sounds are normal.      Palpations: Abdomen is soft.      Tenderness: There is no abdominal tenderness.   Musculoskeletal:         General: Normal range of motion.      Cervical back: Normal range of motion. No muscular tenderness.   Lymphadenopathy:      Head:      Right side of head: No submental, submandibular, preauricular, posterior auricular or occipital adenopathy.      Left side of head: No submental, submandibular, preauricular, posterior auricular or occipital adenopathy.      Cervical: No cervical adenopathy.   Skin:     General: Skin is warm and dry.      Findings: No petechiae or rash.      Nails: There is no clubbing.   Neurological:      Mental Status: He is alert and oriented to person, place, and time.      Cranial Nerves: No cranial nerve deficit.      Sensory: No sensory deficit.      Gait: Gait normal.      Comments: Neuro otologic-no nystagmus, normal gait   Psychiatric:         Speech: Speech normal.         Behavior: Behavior normal. Behavior is cooperative.         Thought Content: Thought content normal.         Judgment: Judgment normal.         Review of MRI of 04/15/2019-no  retrocochlear pathology, MRI brain normal                      Serial review of current of initial audiogram on March 6, 2019 through February 23, 2022 done by me-regionally a low-frequency hearing loss in the right ear that reversed itself completely and has progressed to a sloping mild-to-moderate right-sided hearing loss of progressing severity.  I read the series of audiograms with the patient and discussed the results with him    Assessment:       Problem List Items Addressed This Visit    None     Visit Diagnoses     Sensorineural hearing loss (SNHL) of right ear with unrestricted hearing of left ear    -  Primary    Meniere's disease, unspecified laterality        Tinnitus of right ear              Plan:       I will refer him to Dr. Sivakumar Rdz at [facility].  I am advising that he use either Claritin or Allegra to control allergy symptoms and have restarted his Dyazide daily therapy.  I will recheck him in 6 weeks.

## 2022-02-28 ENCOUNTER — TELEPHONE (OUTPATIENT)
Dept: OTOLARYNGOLOGY | Facility: CLINIC | Age: 37
End: 2022-02-28
Payer: COMMERCIAL

## 2022-03-14 ENCOUNTER — OFFICE VISIT (OUTPATIENT)
Dept: OTOLARYNGOLOGY | Facility: CLINIC | Age: 37
End: 2022-03-14
Payer: COMMERCIAL

## 2022-03-14 VITALS — WEIGHT: 168 LBS | BODY MASS INDEX: 24.81 KG/M2

## 2022-03-14 DIAGNOSIS — H90.41 SENSORINEURAL HEARING LOSS (SNHL) OF RIGHT EAR WITH UNRESTRICTED HEARING OF LEFT EAR: ICD-10-CM

## 2022-03-14 DIAGNOSIS — H93.11 TINNITUS OF RIGHT EAR: ICD-10-CM

## 2022-03-14 DIAGNOSIS — G43.109 MIGRAINE EQUIVALENT SYNDROME: Primary | ICD-10-CM

## 2022-03-14 PROCEDURE — 99215 PR OFFICE/OUTPT VISIT, EST, LEVL V, 40-54 MIN: ICD-10-PCS | Mod: S$GLB,,, | Performed by: OTOLARYNGOLOGY

## 2022-03-14 PROCEDURE — 99999 PR PBB SHADOW E&M-EST. PATIENT-LVL III: CPT | Mod: PBBFAC,,, | Performed by: OTOLARYNGOLOGY

## 2022-03-14 PROCEDURE — 3008F BODY MASS INDEX DOCD: CPT | Mod: CPTII,S$GLB,, | Performed by: OTOLARYNGOLOGY

## 2022-03-14 PROCEDURE — 3008F PR BODY MASS INDEX (BMI) DOCUMENTED: ICD-10-PCS | Mod: CPTII,S$GLB,, | Performed by: OTOLARYNGOLOGY

## 2022-03-14 PROCEDURE — 1159F MED LIST DOCD IN RCRD: CPT | Mod: CPTII,S$GLB,, | Performed by: OTOLARYNGOLOGY

## 2022-03-14 PROCEDURE — 1159F PR MEDICATION LIST DOCUMENTED IN MEDICAL RECORD: ICD-10-PCS | Mod: CPTII,S$GLB,, | Performed by: OTOLARYNGOLOGY

## 2022-03-14 PROCEDURE — 99215 OFFICE O/P EST HI 40 MIN: CPT | Mod: S$GLB,,, | Performed by: OTOLARYNGOLOGY

## 2022-03-14 PROCEDURE — 99999 PR PBB SHADOW E&M-EST. PATIENT-LVL III: ICD-10-PCS | Mod: PBBFAC,,, | Performed by: OTOLARYNGOLOGY

## 2022-03-14 NOTE — PROGRESS NOTES
"Subjective:       Patient ID: Kaden Heller is a 36 y.o. male.    Chief Complaint: Tinnitus    HPI: Ref Dr Saravia.    C/O R Tinn, HL.    Pres for yrs.    Now with some AS. Told has " Menieres Dz" One Dizz spell.  Has occ wooziness. Has Rec HA, Visual sx's, Pos Fam hx of HL and Migraines.    Gets Brain Fog. MRI neg.     R pinna hyper vascular. No VNG.    On HCTZ ? Help.    Past Medical History: Patient has a past medical history of Meniere disease (12/2019) and Mixed hyperlipidemia (2/18/2019).    Past Surgical History: Patient has a past surgical history that includes Hip arthroscopy w/ labral repair and Appendectomy.    Social History: Patient reports that he has never smoked. He has never used smokeless tobacco. He reports current alcohol use of about 3.0 standard drinks of alcohol per week. He reports that he does not use drugs.    Family History: family history includes Anemia in his sister; Asthma in his father; Heart attack in his maternal grandfather, maternal grandmother, maternal uncle, and mother; Heart disease in his maternal grandfather, maternal grandmother, maternal uncle, and mother; No Known Problems in his daughter and son.    Medications:   Current Outpatient Medications   Medication Sig    meclizine (ANTIVERT) 12.5 mg tablet Take 1 tablet (12.5 mg total) by mouth 2 (two) times daily as needed for Dizziness.    triamterene-hydrochlorothiazide 37.5-25 mg (DYAZIDE) 37.5-25 mg per capsule Take 1 capsule by mouth every morning.     No current facility-administered medications for this visit.       Allergies: Patient is allergic to hazelnut and micheal.    Review of Systems   Constitutional: Negative for activity change, appetite change, chills, diaphoresis, fatigue, fever and unexpected weight change.   HENT: Positive for hearing loss and tinnitus. Negative for nasal congestion, ear discharge, ear pain, facial swelling, nosebleeds, postnasal drip, rhinorrhea, sinus pressure/congestion, sneezing, sore " throat, trouble swallowing and voice change.    Eyes: Negative for photophobia, pain, discharge, redness and visual disturbance.   Respiratory: Negative.  Negative for cough, chest tightness, shortness of breath and wheezing.    Cardiovascular: Positive for chest pain. Negative for palpitations.   Gastrointestinal: Negative.  Negative for abdominal pain, constipation, diarrhea and nausea.   Endocrine: Negative.    Genitourinary: Negative.  Negative for dysuria and frequency.   Musculoskeletal: Positive for back pain and neck pain. Negative for arthralgias, gait problem, joint swelling, myalgias and neck stiffness.   Integumentary:  Negative for color change, pallor and rash. Negative.   Allergic/Immunologic: Positive for food allergies.   Neurological: Positive for dizziness, light-headedness and headaches. Negative for tremors, seizures, syncope, facial asymmetry, speech difficulty, weakness and numbness.   Hematological: Negative.  Negative for adenopathy. Does not bruise/bleed easily.   Psychiatric/Behavioral: Negative for agitation, confusion, decreased concentration, dysphoric mood and sleep disturbance. The patient is nervous/anxious. The patient is not hyperactive.          Objective:      Physical Exam  Constitutional:       General: He is not in acute distress.     Appearance: Normal appearance. He is well-developed. He is not ill-appearing, toxic-appearing or diaphoretic.   HENT:      Head: Not macrocephalic and not microcephalic. No raccoon eyes, Crain's sign, abrasion, contusion, right periorbital erythema, left periorbital erythema or laceration. Hair is normal.      Jaw: No trismus.      Right Ear: Decreased hearing noted. No laceration, drainage, swelling or tenderness. No middle ear effusion. No foreign body. No mastoid tenderness. No hemotympanum. Tympanic membrane is not injected, scarred, perforated, erythematous, retracted or bulging. Tympanic membrane has normal mobility.      Left Ear:  Decreased hearing noted. No laceration, drainage, swelling or tenderness.  No middle ear effusion. No foreign body. No mastoid tenderness. No hemotympanum. Tympanic membrane is not injected, scarred, perforated, erythematous, retracted or bulging. Tympanic membrane has normal mobility.      Ears:        Comments: R pinna slt injection.           Nose: No nasal deformity, septal deviation, laceration, mucosal edema or rhinorrhea.      Right Sinus: No maxillary sinus tenderness.      Left Sinus: No maxillary sinus tenderness.      Mouth/Throat:      Mouth: Mucous membranes are not pale, not dry and not cyanotic. No lacerations or oral lesions.      Dentition: Normal dentition. No dental caries or dental abscesses.      Pharynx: Uvula midline. No oropharyngeal exudate or uvula swelling.      Tonsils: No tonsillar abscesses.   Eyes:      General: No scleral icterus.        Right eye: No foreign body or discharge.         Left eye: No foreign body or discharge.      Extraocular Movements:      Right eye: Normal extraocular motion and no nystagmus.      Left eye: Normal extraocular motion and no nystagmus.      Conjunctiva/sclera:      Right eye: Right conjunctiva is not injected. No chemosis, exudate or hemorrhage.     Left eye: Left conjunctiva is not injected. No chemosis, exudate or hemorrhage.     Pupils: Pupils are equal.      Right eye: Pupil is round and reactive.      Left eye: Pupil is round and reactive.   Neck:      Thyroid: No thyroid mass or thyromegaly.      Vascular: No JVD.      Trachea: No tracheal tenderness or tracheal deviation.   Cardiovascular:      Rate and Rhythm: Normal rate and regular rhythm.   Pulmonary:      Effort: No tachypnea, bradypnea, accessory muscle usage or respiratory distress.      Breath sounds: Normal breath sounds. No stridor.   Abdominal:      Palpations: Abdomen is soft.   Musculoskeletal:         General: Normal range of motion.      Cervical back: No edema, erythema or  rigidity. No muscular tenderness. Normal range of motion.   Lymphadenopathy:      Head:      Right side of head: No submental, submandibular, tonsillar, preauricular, posterior auricular or occipital adenopathy.      Left side of head: No submental, submandibular, tonsillar, preauricular, posterior auricular or occipital adenopathy.      Cervical: No cervical adenopathy.      Right cervical: No superficial, deep or posterior cervical adenopathy.     Left cervical: No superficial, deep or posterior cervical adenopathy.   Skin:     Coloration: Skin is not pale.      Findings: No abrasion, bruising, burn, ecchymosis, erythema, laceration, lesion or rash.   Neurological:      Mental Status: He is alert and oriented to person, place, and time. He is not disoriented.      Cranial Nerves: No cranial nerve deficit.      Sensory: No sensory deficit.      Motor: No tremor, atrophy, abnormal muscle tone or seizure activity.   Psychiatric:         Mood and Affect: Mood is not anxious or depressed. Affect is not labile, blunt, angry or inappropriate.         Speech: He is communicative. Speech is not rapid and pressured, delayed, slurred or tangential.         Behavior: Behavior normal. Behavior is not agitated, aggressive, withdrawn, hyperactive or combative.         Thought Content: Thought content normal.         Cognition and Memory: Memory is not impaired. He does not exhibit impaired recent memory or impaired remote memory.                 Assessment:       Problem List Items Addressed This Visit    None     Visit Diagnoses     Migraine equivalent syndrome    -  Primary    Sensorineural hearing loss (SNHL) of right ear with unrestricted hearing of left ear        Tinnitus of right ear              Plan:         No evidence of MD.    Migraine Handout/ diet.    Discussed with patient multiple tinnitus management strategies including the use of maskers, instruments, background sound enrichment and other means. All questions  answered and appropriate references given.    Patient to see Audiology for hearing aid evaluation.    Migraine suppl. B2, Mg++, CoQ10.    F/U 6 mos.

## 2022-03-30 ENCOUNTER — CLINICAL SUPPORT (OUTPATIENT)
Dept: AUDIOLOGY | Facility: CLINIC | Age: 37
End: 2022-03-30
Payer: COMMERCIAL

## 2022-03-30 DIAGNOSIS — H90.41 SENSORINEURAL HEARING LOSS (SNHL) OF RIGHT EAR WITH UNRESTRICTED HEARING OF LEFT EAR: Primary | ICD-10-CM

## 2022-03-30 PROCEDURE — 99499 NO LOS: ICD-10-PCS | Mod: S$GLB,,, | Performed by: AUDIOLOGIST

## 2022-03-30 PROCEDURE — 99499 UNLISTED E&M SERVICE: CPT | Mod: S$GLB,,, | Performed by: AUDIOLOGIST

## 2022-03-31 NOTE — PROGRESS NOTES
Mr. Kaden Heller was seen today for a hearing aid consultation.  The audiogram was explained in detail and monaural amplification was recommended.  The pros and cons of various styles and technology levels was discussed.  A DARRIN style instrument was recommended for the right ear.  Kaden will receive a 20% [facility] [employer] discount.  We also discussed tinnitus management strategies.  [employer] Relief albert, How to Manage Your Tinnitus brochure, and the American Tinnitus Association website were all given as resources for tinnitus management.  Mr. Heller verbalized understanding of the 30 day trial period, of the $250 non-refundable deposit at the time of order, and of the fact that we do not file insurance on behalf of the patient.  Mr. Heller was encouraged to call or email with any questions.

## 2022-04-12 ENCOUNTER — OFFICE VISIT (OUTPATIENT)
Dept: OTOLARYNGOLOGY | Facility: CLINIC | Age: 37
End: 2022-04-12
Payer: COMMERCIAL

## 2022-04-12 VITALS
TEMPERATURE: 98 F | HEART RATE: 73 BPM | DIASTOLIC BLOOD PRESSURE: 97 MMHG | BODY MASS INDEX: 24.26 KG/M2 | SYSTOLIC BLOOD PRESSURE: 152 MMHG | HEIGHT: 69 IN | WEIGHT: 163.81 LBS

## 2022-04-12 DIAGNOSIS — G43.109 MIGRAINE EQUIVALENT SYNDROME: ICD-10-CM

## 2022-04-12 DIAGNOSIS — H90.41 SENSORINEURAL HEARING LOSS (SNHL) OF RIGHT EAR WITH UNRESTRICTED HEARING OF LEFT EAR: Primary | ICD-10-CM

## 2022-04-12 DIAGNOSIS — H81.01 MENIERE'S DISEASE OF RIGHT EAR: ICD-10-CM

## 2022-04-12 DIAGNOSIS — J30.9 ALLERGIC RHINITIS, UNSPECIFIED SEASONALITY, UNSPECIFIED TRIGGER: ICD-10-CM

## 2022-04-12 DIAGNOSIS — H93.11 TINNITUS OF RIGHT EAR: ICD-10-CM

## 2022-04-12 DIAGNOSIS — J34.2 NASAL SEPTAL DEVIATION: ICD-10-CM

## 2022-04-12 PROCEDURE — 99213 PR OFFICE/OUTPT VISIT, EST, LEVL III, 20-29 MIN: ICD-10-PCS | Mod: S$GLB,,, | Performed by: SPECIALIST

## 2022-04-12 PROCEDURE — 3080F PR MOST RECENT DIASTOLIC BLOOD PRESSURE >= 90 MM HG: ICD-10-PCS | Mod: CPTII,S$GLB,, | Performed by: SPECIALIST

## 2022-04-12 PROCEDURE — 99999 PR PBB SHADOW E&M-EST. PATIENT-LVL III: CPT | Mod: PBBFAC,,, | Performed by: SPECIALIST

## 2022-04-12 PROCEDURE — 3077F PR MOST RECENT SYSTOLIC BLOOD PRESSURE >= 140 MM HG: ICD-10-PCS | Mod: CPTII,S$GLB,, | Performed by: SPECIALIST

## 2022-04-12 PROCEDURE — 99999 PR PBB SHADOW E&M-EST. PATIENT-LVL III: ICD-10-PCS | Mod: PBBFAC,,, | Performed by: SPECIALIST

## 2022-04-12 PROCEDURE — 3080F DIAST BP >= 90 MM HG: CPT | Mod: CPTII,S$GLB,, | Performed by: SPECIALIST

## 2022-04-12 PROCEDURE — 99213 OFFICE O/P EST LOW 20 MIN: CPT | Mod: S$GLB,,, | Performed by: SPECIALIST

## 2022-04-12 PROCEDURE — 1160F RVW MEDS BY RX/DR IN RCRD: CPT | Mod: CPTII,S$GLB,, | Performed by: SPECIALIST

## 2022-04-12 PROCEDURE — 1159F PR MEDICATION LIST DOCUMENTED IN MEDICAL RECORD: ICD-10-PCS | Mod: CPTII,S$GLB,, | Performed by: SPECIALIST

## 2022-04-12 PROCEDURE — 1159F MED LIST DOCD IN RCRD: CPT | Mod: CPTII,S$GLB,, | Performed by: SPECIALIST

## 2022-04-12 PROCEDURE — 3008F BODY MASS INDEX DOCD: CPT | Mod: CPTII,S$GLB,, | Performed by: SPECIALIST

## 2022-04-12 PROCEDURE — 3077F SYST BP >= 140 MM HG: CPT | Mod: CPTII,S$GLB,, | Performed by: SPECIALIST

## 2022-04-12 PROCEDURE — 3008F PR BODY MASS INDEX (BMI) DOCUMENTED: ICD-10-PCS | Mod: CPTII,S$GLB,, | Performed by: SPECIALIST

## 2022-04-12 PROCEDURE — 1160F PR REVIEW ALL MEDS BY PRESCRIBER/CLIN PHARMACIST DOCUMENTED: ICD-10-PCS | Mod: CPTII,S$GLB,, | Performed by: SPECIALIST

## 2022-04-12 NOTE — PROGRESS NOTES
"Subjective:       Patient ID: Kaden Heller is a 37 y.o. male.    Chief Complaint: Follow-up    Follow-up    The patient is returning for follow-up visit.  Since starting on Dyazide after his last visit here he is noticed a marked improvement in his symptom level.  His tinnitus is very much decreased on the right and absent on the left.  He has had 1 brief episode tinnitus on left since starting the Dyazide.  The pressure in his right ear has resolved any is had no episodes of vertigo he has had 1 day where he had" brain fog" associated with the ear pressure.  Before starting the Dyazide he was having this brain fog 3-4 days per week.  He did see Dr. Moore at [facility] felt that he had migraine equivalent syndrome.      Review of Systems   Constitutional: Negative.  Negative for activity change, appetite change and unexpected weight change.   HENT: Positive for ear pain (, acute otitis media/ear infections), hearing loss, postnasal drip, sinus pressure/congestion and tinnitus. Negative for ear discharge, facial swelling, mouth sores, rhinorrhea, sneezing, trouble swallowing and voice change.    Eyes: Negative.  Negative for photophobia, pain, discharge, redness, itching and visual disturbance.        Floaters   Respiratory: Positive for wheezing. Negative for apnea, choking and shortness of breath.    Cardiovascular: Negative for palpitations.   Gastrointestinal: Positive for diarrhea. Negative for abdominal distention.   Endocrine: Negative.    Genitourinary: Negative.    Musculoskeletal: Negative.  Negative for neck stiffness.   Integumentary:  Negative for color change and pallor. Negative.   Allergic/Immunologic: Positive for environmental allergies (Seasonal allergies). Negative for food allergies.   Neurological: Positive for dizziness and light-headedness. Negative for facial asymmetry and speech difficulty.   Hematological: Negative.  Negative for adenopathy. Does not bruise/bleed easily. "   Psychiatric/Behavioral: Negative for agitation, confusion, decreased concentration and sleep disturbance. The patient is nervous/anxious.          Objective:      Physical Exam  Vitals and nursing note reviewed.   Constitutional:       General: He is awake.      Appearance: Normal appearance. He is well-developed, well-groomed and normal weight.   HENT:      Head: Normocephalic.      Jaw: There is normal jaw occlusion.      Salivary Glands: Right salivary gland is not diffusely enlarged. Left salivary gland is not diffusely enlarged.      Right Ear: Hearing, ear canal and external ear normal. Tympanic membrane is retracted. Tympanic membrane has decreased mobility (Non mobile on pneumatic otoscopy).      Left Ear: Hearing, ear canal and external ear normal. Tympanic membrane is retracted. Tympanic membrane has decreased mobility ( decreased mobility on pneumatic otoscopy).      Nose: Septal deviation ( to the right), mucosal edema (cyanotic, boggy inferior turbinates bilaterally) and rhinorrhea (clear mucus bilaterally) present. No nasal deformity. Rhinorrhea is clear.      Right Turbinates: Enlarged and pale.      Left Turbinates: Enlarged and pale.      Mouth/Throat:      Lips: No lesions.      Mouth: No oral lesions.      Dentition: No gum lesions.      Tongue: No lesions.      Palate: No mass and lesions.      Pharynx: Oropharynx is clear. Uvula midline.      Tonsils: No tonsillar exudate. 2+ on the right. 2+ on the left.   Eyes:      General: Lids are normal.         Right eye: No discharge.         Left eye: No discharge.      Conjunctiva/sclera:      Right eye: Right conjunctiva is injected. No exudate.     Left eye: Left conjunctiva is injected. No exudate.     Pupils: Pupils are equal, round, and reactive to light.   Neck:      Thyroid: No thyroid mass or thyromegaly.      Vascular: No carotid bruit.      Trachea: Trachea normal. No tracheal deviation.   Cardiovascular:      Rate and Rhythm: Normal rate  and regular rhythm.      Pulses: Normal pulses.      Heart sounds: Normal heart sounds.   Pulmonary:      Effort: Pulmonary effort is normal.      Breath sounds: Normal breath sounds. No stridor. No decreased breath sounds, wheezing, rhonchi or rales.   Abdominal:      General: Bowel sounds are normal.      Palpations: Abdomen is soft.      Tenderness: There is no abdominal tenderness.   Musculoskeletal:         General: Normal range of motion.      Cervical back: Normal range of motion. No muscular tenderness.   Lymphadenopathy:      Head:      Right side of head: No submental, submandibular, preauricular, posterior auricular or occipital adenopathy.      Left side of head: No submental, submandibular, preauricular, posterior auricular or occipital adenopathy.      Cervical: No cervical adenopathy.   Skin:     General: Skin is warm and dry.      Findings: No petechiae or rash.      Nails: There is no clubbing.   Neurological:      Mental Status: He is alert and oriented to person, place, and time.      Cranial Nerves: No cranial nerve deficit.      Sensory: No sensory deficit.      Gait: Gait normal.      Comments: Neuro otologic-no nystagmus, normal gait   Psychiatric:         Speech: Speech normal.         Behavior: Behavior normal. Behavior is cooperative.         Thought Content: Thought content normal.         Judgment: Judgment normal.           Assessment:       Problem List Items Addressed This Visit    None     Visit Diagnoses     Sensorineural hearing loss (SNHL) of right ear with unrestricted hearing of left ear    -  Primary    Tinnitus of right ear        Meniere's disease of right ear        Migraine equivalent syndrome              Plan:       I  will have the patient continue with the Dyazide, low-sodium diet and caffeine and nicotine restrictions.  Am advising that he obtain a hearing aid for his right ear.  He is medically cleared for amplification to the right ear.  I will recheck him in 3  months, or sooner an as-needed basis.  He will need his potassium monitored least once yearly.

## 2022-04-18 ENCOUNTER — HOSPITAL ENCOUNTER (EMERGENCY)
Facility: HOSPITAL | Age: 37
Discharge: HOME | End: 2022-04-19
Attending: EMERGENCY MEDICINE
Payer: COMMERCIAL

## 2022-04-18 DIAGNOSIS — R07.89 CHEST WALL PAIN: ICD-10-CM

## 2022-04-18 DIAGNOSIS — R07.9 CHEST PAIN: ICD-10-CM

## 2022-04-18 DIAGNOSIS — R03.0 ELEVATED BLOOD PRESSURE READING WITHOUT DIAGNOSIS OF HYPERTENSION: ICD-10-CM

## 2022-04-18 DIAGNOSIS — R74.01 TRANSAMINITIS: Primary | ICD-10-CM

## 2022-04-18 LAB
ALBUMIN SERPL BCP-MCNC: 4.8 G/DL (ref 3.5–5.2)
ALP SERPL-CCNC: 68 U/L (ref 55–135)
ALT SERPL W/O P-5'-P-CCNC: 189 U/L (ref 10–44)
ANION GAP SERPL CALC-SCNC: 13 MMOL/L (ref 8–16)
AST SERPL-CCNC: 93 U/L (ref 10–40)
BASOPHILS # BLD AUTO: 0.04 K/UL (ref 0–0.2)
BASOPHILS NFR BLD: 0.4 % (ref 0–1.9)
BILIRUB SERPL-MCNC: 1 MG/DL (ref 0.1–1)
BNP SERPL-MCNC: <10 PG/ML (ref 0–99)
BUN SERPL-MCNC: 10 MG/DL (ref 6–20)
CALCIUM SERPL-MCNC: 10.5 MG/DL (ref 8.7–10.5)
CHLORIDE SERPL-SCNC: 97 MMOL/L (ref 95–110)
CK SERPL-CCNC: 233 U/L (ref 20–200)
CO2 SERPL-SCNC: 27 MMOL/L (ref 23–29)
CREAT SERPL-MCNC: 0.8 MG/DL (ref 0.5–1.4)
D DIMER PPP IA.FEU-MCNC: 1.04 MG/L FEU
DIFFERENTIAL METHOD: ABNORMAL
EOSINOPHIL # BLD AUTO: 0 K/UL (ref 0–0.5)
EOSINOPHIL NFR BLD: 0.1 % (ref 0–8)
ERYTHROCYTE [DISTWIDTH] IN BLOOD BY AUTOMATED COUNT: 11.8 % (ref 11.5–14.5)
EST. GFR  (AFRICAN AMERICAN): >60 ML/MIN/1.73 M^2
EST. GFR  (NON AFRICAN AMERICAN): >60 ML/MIN/1.73 M^2
GLUCOSE SERPL-MCNC: 94 MG/DL (ref 70–110)
HCT VFR BLD AUTO: 48.2 % (ref 40–54)
HGB BLD-MCNC: 16.7 G/DL (ref 14–18)
IMM GRANULOCYTES # BLD AUTO: 0.02 K/UL (ref 0–0.04)
IMM GRANULOCYTES NFR BLD AUTO: 0.2 % (ref 0–0.5)
LYMPHOCYTES # BLD AUTO: 1.6 K/UL (ref 1–4.8)
LYMPHOCYTES NFR BLD: 17.5 % (ref 18–48)
MCH RBC QN AUTO: 31.6 PG (ref 27–31)
MCHC RBC AUTO-ENTMCNC: 34.6 G/DL (ref 32–36)
MCV RBC AUTO: 91 FL (ref 82–98)
MONOCYTES # BLD AUTO: 0.7 K/UL (ref 0.3–1)
MONOCYTES NFR BLD: 8 % (ref 4–15)
NEUTROPHILS # BLD AUTO: 6.7 K/UL (ref 1.8–7.7)
NEUTROPHILS NFR BLD: 73.8 % (ref 38–73)
NRBC BLD-RTO: 0 /100 WBC
PLATELET # BLD AUTO: 231 K/UL (ref 150–450)
PMV BLD AUTO: 11 FL (ref 9.2–12.9)
POTASSIUM SERPL-SCNC: 3.5 MMOL/L (ref 3.5–5.1)
PROT SERPL-MCNC: 8.6 G/DL (ref 6–8.4)
RBC # BLD AUTO: 5.28 M/UL (ref 4.6–6.2)
SODIUM SERPL-SCNC: 137 MMOL/L (ref 136–145)
TROPONIN I SERPL DL<=0.01 NG/ML-MCNC: <0.006 NG/ML (ref 0–0.03)
TSH SERPL DL<=0.005 MIU/L-ACNC: 1.45 UIU/ML (ref 0.4–4)
WBC # BLD AUTO: 9.09 K/UL (ref 3.9–12.7)

## 2022-04-18 PROCEDURE — 84484 ASSAY OF TROPONIN QUANT: CPT | Mod: 91 | Performed by: EMERGENCY MEDICINE

## 2022-04-18 PROCEDURE — 99284 EMERGENCY DEPT VISIT MOD MDM: CPT | Mod: ,,, | Performed by: EMERGENCY MEDICINE

## 2022-04-18 PROCEDURE — 82550 ASSAY OF CK (CPK): CPT | Performed by: NURSE PRACTITIONER

## 2022-04-18 PROCEDURE — 25000003 PHARM REV CODE 250: Performed by: NURSE PRACTITIONER

## 2022-04-18 PROCEDURE — 25500020 PHARM REV CODE 255: Performed by: EMERGENCY MEDICINE

## 2022-04-18 PROCEDURE — 63600175 PHARM REV CODE 636 W HCPCS: Performed by: EMERGENCY MEDICINE

## 2022-04-18 PROCEDURE — 25000003 PHARM REV CODE 250: Performed by: EMERGENCY MEDICINE

## 2022-04-18 PROCEDURE — 99285 EMERGENCY DEPT VISIT HI MDM: CPT | Mod: 25

## 2022-04-18 PROCEDURE — 84484 ASSAY OF TROPONIN QUANT: CPT | Performed by: NURSE PRACTITIONER

## 2022-04-18 PROCEDURE — 85379 FIBRIN DEGRADATION QUANT: CPT | Performed by: EMERGENCY MEDICINE

## 2022-04-18 PROCEDURE — 87389 HIV-1 AG W/HIV-1&-2 AB AG IA: CPT | Performed by: EMERGENCY MEDICINE

## 2022-04-18 PROCEDURE — 86803 HEPATITIS C AB TEST: CPT | Performed by: EMERGENCY MEDICINE

## 2022-04-18 PROCEDURE — 85025 COMPLETE CBC W/AUTO DIFF WBC: CPT | Performed by: NURSE PRACTITIONER

## 2022-04-18 PROCEDURE — 93010 EKG 12-LEAD: ICD-10-PCS | Mod: ,,, | Performed by: INTERNAL MEDICINE

## 2022-04-18 PROCEDURE — 99284 PR EMERGENCY DEPT VISIT,LEVEL IV: ICD-10-PCS | Mod: ,,, | Performed by: EMERGENCY MEDICINE

## 2022-04-18 PROCEDURE — 80053 COMPREHEN METABOLIC PANEL: CPT | Performed by: NURSE PRACTITIONER

## 2022-04-18 PROCEDURE — 84443 ASSAY THYROID STIM HORMONE: CPT | Performed by: NURSE PRACTITIONER

## 2022-04-18 PROCEDURE — 83880 ASSAY OF NATRIURETIC PEPTIDE: CPT | Performed by: NURSE PRACTITIONER

## 2022-04-18 PROCEDURE — 93010 ELECTROCARDIOGRAM REPORT: CPT | Mod: ,,, | Performed by: INTERNAL MEDICINE

## 2022-04-18 PROCEDURE — 93005 ELECTROCARDIOGRAM TRACING: CPT

## 2022-04-18 PROCEDURE — 96360 HYDRATION IV INFUSION INIT: CPT

## 2022-04-18 RX ORDER — MAG HYDROX/ALUMINUM HYD/SIMETH 200-200-20
15 SUSPENSION, ORAL (FINAL DOSE FORM) ORAL
Status: COMPLETED | OUTPATIENT
Start: 2022-04-18 | End: 2022-04-18

## 2022-04-18 RX ORDER — ASPIRIN 325 MG
325 TABLET ORAL
Status: COMPLETED | OUTPATIENT
Start: 2022-04-18 | End: 2022-04-18

## 2022-04-18 RX ADMIN — ALUMINUM HYDROXIDE, MAGNESIUM HYDROXIDE, AND SIMETHICONE 15 ML: 200; 200; 20 SUSPENSION ORAL at 10:04

## 2022-04-18 RX ADMIN — IOHEXOL 75 ML: 350 INJECTION, SOLUTION INTRAVENOUS at 09:04

## 2022-04-18 RX ADMIN — SODIUM CHLORIDE, SODIUM LACTATE, POTASSIUM CHLORIDE, AND CALCIUM CHLORIDE 1000 ML: .6; .31; .03; .02 INJECTION, SOLUTION INTRAVENOUS at 07:04

## 2022-04-18 RX ADMIN — ASPIRIN 325 MG ORAL TABLET 325 MG: 325 PILL ORAL at 06:04

## 2022-04-18 NOTE — Clinical Note
"Kaden Heller (Daniel) was seen and treated in our emergency department on 4/18/2022.  He may return to work on 04/20/2022.       If you have any questions or concerns, please don't hesitate to call.       RN    "

## 2022-04-18 NOTE — Clinical Note
"Kaden"Paola Heller was seen and treated in our emergency department on 4/18/2022.  He may return to work on 04/20/2022.       If you have any questions or concerns, please don't hesitate to call.      Steven Bedolla MD"

## 2022-04-18 NOTE — ED PROVIDER NOTES
Encounter Date: 4/18/2022       History     Chief Complaint   Patient presents with    Chest Pain     Both hands tingling, having a lot of anxiety,     37-year-old man with comorbidities of unilateral hearing loss with Meniere's disease, hyperlipidemia, as well as allergies presents to the ED for evaluation of left-sided chest discomfort as well as bilateral shoulder pain and subjective weakness of bilateral hands.  The patient also notes feeling anxious today related to work issues, and was noted to exhibit an elevated blood pressure earlier today which has improved without intervention.  At the time of my exam, he describes mild to moderate left-sided chest pain with extremity weakness.  The patient also reports that he was able to complete a 10 K run 2 days ago without significant difficulty.  He denies any associated fevers, chills, nausea, vomiting, or associated diarrhea.  He does report previous episodes of palpitations and chest discomfort in the past the workup for which did not reveal any evidence of pathology.        Review of patient's allergies indicates:   Allergen Reactions    Hazelnut Itching, Other (See Comments) and Swelling    Micheal Itching, Other (See Comments) and Swelling     Past Medical History:   Diagnosis Date    Food allergy 2015    micheal and hazelnut    GUSTABO (generalized anxiety disorder) 4/20/2022    Hearing loss 11/27/2020    same time tinnitus started 24/7    Meniere disease 12/2019    Mixed hyperlipidemia 02/18/2019    Seasonal allergies      Past Surgical History:   Procedure Laterality Date    APPENDECTOMY      HIP ARTHROSCOPY W/ LABRAL REPAIR      HIP SURGERY  3/3/2006    arthriscopic     Family History   Problem Relation Age of Onset    Heart disease Mother     Heart attack Mother     Hypertension Mother         horrible heart disease on mother's side    Asthma Father     Anemia Sister     No Known Problems Daughter     No Known Problems Son     Heart attack  Maternal Uncle     Heart disease Maternal Uncle     Heart attack Maternal Grandmother     Heart disease Maternal Grandmother     Heart attack Maternal Grandfather     Heart disease Maternal Grandfather      Social History     Tobacco Use    Smoking status: Never Smoker    Smokeless tobacco: Never Used   Substance Use Topics    Alcohol use: Yes     Alcohol/week: 3.0 standard drinks     Types: 2 Cans of beer, 1 Shots of liquor per week    Drug use: Never     Review of Systems   Constitutional: Negative for chills and fever.   HENT: Negative for nosebleeds and trouble swallowing.    Respiratory: Positive for chest tightness. Negative for cough and shortness of breath.    Cardiovascular: Positive for chest pain and palpitations. Negative for leg swelling.   Gastrointestinal: Negative for abdominal distention, anal bleeding, nausea and vomiting.   Genitourinary: Negative for dysuria and hematuria.   Musculoskeletal: Positive for neck pain. Negative for back pain and neck stiffness.   Skin: Negative for rash and wound.   Neurological: Positive for light-headedness. Negative for seizures and syncope.   Psychiatric/Behavioral: Negative for confusion. The patient is nervous/anxious.        Physical Exam     Initial Vitals [04/18/22 1743]   BP Pulse Resp Temp SpO2   (!) 149/104 100 18 98.6 °F (37 °C) 98 %      MAP       --         Physical Exam    Vitals reviewed.  Constitutional:   37-year-old  man, mildly anxious, otherwise no acute distress noted   HENT:   Head: Normocephalic and atraumatic.   Mildly desiccated mucous membranes noted without additional intraoral injury   Eyes: EOM are normal. Pupils are equal, round, and reactive to light.   Neck: No tracheal deviation present.   There is mild-to-moderate bilateral spasm of trapezii without midline neck tenderness   Cardiovascular: Normal rate, regular rhythm, normal heart sounds and intact distal pulses.   Pulmonary/Chest: Breath sounds normal. No  stridor. No respiratory distress. He has no wheezes.   Abdominal: Abdomen is soft. There is no abdominal tenderness.   Musculoskeletal:         General: No edema. Normal range of motion.     Neurological: He is alert and oriented to person, place, and time.   Skin: Skin is warm and dry.   Psychiatric: Thought content normal.   Mildly anxious, cooperative with exam, insight intact         ED Course   Procedures  Labs Reviewed   CBC W/ AUTO DIFFERENTIAL - Abnormal; Notable for the following components:       Result Value    MCH 31.6 (*)     Gran % 73.8 (*)     Lymph % 17.5 (*)     All other components within normal limits    Narrative:     Release to patient->Immediate  ADD ON CPK TSH ORD#[id number] PER MD [facility] 04/18/22 @1838   COMPREHENSIVE METABOLIC PANEL - Abnormal; Notable for the following components:    Total Protein 8.6 (*)     AST 93 (*)      (*)     All other components within normal limits    Narrative:     Release to patient->Immediate  ADD ON CPK TSH ORD#[id number] PER MD [facility] 04/18/22 @1838   CK - Abnormal; Notable for the following components:     (*)     All other components within normal limits    Narrative:     Release to patient->Immediate  ADD ON CPK TSH ORD#[id number] PER MD [facility] 04/18/22 @1838   D DIMER, QUANTITATIVE - Abnormal; Notable for the following components:    D-Dimer 1.04 (*)     All other components within normal limits   HIV 1 / 2 ANTIBODY    Narrative:     Release to patient->Immediate  ADD ON CPK TSH ORD#[id number] PER MD [facility] 04/18/22 @1838   HEPATITIS C ANTIBODY    Narrative:     Release to patient->Immediate  ADD ON CPK TSH ORD#[id number] PER MD [facility] 04/18/22 @1838   TROPONIN I    Narrative:     Release to patient->Immediate  ADD ON CPK TSH ORD#[id number] PER MD [facility] 04/18/22 @1838   B-TYPE NATRIURETIC PEPTIDE    Narrative:     Release to patient->Immediate  ADD ON CPK TSH ORD#[id number] PER MD  [facility] 04/18/22 @1838   TSH   CK   TSH    Narrative:     Release to patient->Immediate  ADD ON CPK TSH ORD#[id number] PER MD [facility] 04/18/22 @1838   TROPONIN I        ECG Results          EKG 12-lead (Final result)  Result time 04/19/22 12:35:09    Final result by Interface, Lab In [facility] (04/19/22 12:35:09)                 Narrative:    Test Reason : R07.9,    Vent. Rate : 097 BPM     Atrial Rate : 097 BPM     P-R Int : 146 ms          QRS Dur : 084 ms      QT Int : 334 ms       P-R-T Axes : 074 102 011 degrees     QTc Int : 424 ms    Normal sinus rhythm with sinus arrhythmia  Rightward axis  Nonspecific ST-t wave abnormality  Abnormal ECG  When compared with ECG of 18-FEB-2019 09:26,  Significant changes have occurred  Confirmed by RAFY UMAÑA MD ([id number]) on 4/19/2022 12:34:59 PM    Referred By: [email or web address]   SELF           Confirmed By:RAFY UMAÑA MD                            Imaging Results          CTA Chest Non-Coronary (PE Study) (Final result)  Result time 04/18/22 21:46:10    Final result by Rafy Pacheco MD (04/18/22 21:46:10)                 Impression:      No acute pulmonary thromboembolism or other thoraco pelvic abnormality.    Electronically signed by resident: Ronda Haas  Date:    04/18/2022  Time:    21:34    Electronically signed by: Rafy Pacheco  Date:    04/18/2022  Time:    21:46             Narrative:    EXAMINATION:  CTA CHEST NON CORONARY    CLINICAL HISTORY:  Pulmonary embolism (PE) suspected, positive D-dimer;    TECHNIQUE:  Low dose axial images, sagittal and coronal reformations were obtained from the thoracic inlet to the lung bases following the IV administration of 75 mL of Omnipaque 350.  Contrast timing was optimized to evaluate the pulmonary arteries.  Maximum intensity projection images were provided for review.    COMPARISON:  Chest radiograph 04/18/2022.    FINDINGS:  Pulmonary vasculature: Satisfactory opacification of the pulmonary arterial system with  no filling defect to the segmental level.    Aorta: Left-sided aortic arch.  No aneurysm and no significant atherosclerosis    Base of Neck: No significant abnormality.    Thoracic soft tissues: Unremarkable    Heart: Normal size. No effusion.    Shana/Mediastinum: No pathologic johny enlargement.    Airways: The large airways are patent. No foci of endobronchial filling.    Lungs/Pleura: Clear lungs. No pleural effusion or thickening.    Esophagus: Unremarkable    Upper Abdomen: No abnormality of the partially imaged upper abdomen.    Bones: No acute fracture. No suspicious lytic or sclerotic lesions.                               X-Ray Chest AP Portable (Final result)  Result time 04/18/22 18:44:38    Final result by Loy Haskins MD (04/18/22 18:44:38)                 Impression:      No acute process.      Electronically signed by: Loy Haskins MD  Date:    04/18/2022  Time:    18:44             Narrative:    EXAMINATION:  XR CHEST AP PORTABLE    CLINICAL HISTORY:  Chest Pain;    TECHNIQUE:  Single frontal view of the chest was performed.    COMPARISON:  None    FINDINGS:  The trachea is unremarkable.  The cardiomediastinal silhouette is within normal limits.  The hemidiaphragms are unremarkable.  There are no pleural effusions.  There is no evidence of a pneumothorax.  There is no evidence of pneumomediastinum.  No airspace opacity is present.  The osseous structures are unremarkable.                                 Medications   aspirin tablet 325 mg (325 mg Oral Given 4/18/22 1810)   lactated ringers bolus 1,000 mL (0 mLs Intravenous Stopped 4/18/22 2010)   iohexoL (OMNIPAQUE 350) injection 75 mL (75 mLs Intravenous Given 4/18/22 2120)   aluminum-magnesium hydroxide-simethicone 200-200-20 mg/5 mL suspension 15 mL (15 mLs Oral Given 4/18/22 2229)     Medical Decision Making:   History:   Old Medical Records: I decided to obtain old medical records.  Old Records Summarized: records from clinic  visits.  Differential Diagnosis:   Palpitations, lethal arrhythmia, rhabdomyolysis, anxiety reaction, unstable angina  Clinical Tests:   Lab Tests: Ordered and Reviewed  Radiological Study: Ordered and Reviewed  Medical Tests: Ordered and Reviewed            Attending Attestation:             Attending ED Notes:   Laboratory evaluation today does not reveal evidence of significant hematologic derangement.  There is mild  transaminitis identified, and the D-dimer is mildly elevated in this patient presenting with body aches and chest pain.  CPK is mildly elevated reflective of his recent exertion.  CTA of the chest does not reveal evidence of acute intrathoracic injury or embolism.  The patient will be discharged home in improved condition with instructions to follow-up with his managing PCP next available and return to the ED as needed for emergent concerns.      ED Course as of 04/22/22 2350   Mon Apr 18, 2022   1757 BP(!): 149/104 [CL]   1757 Temp: 98.6 °F (37 °C) [CL]   1757 Pulse: 100 [CL]   1757 Resp: 18 [CL]   1757 SpO2: 98 % [CL]      ED Course User Index  [CL] Renetta Bojorquez PA-C             Clinical Impression:   Final diagnoses:  [R07.9] Chest pain  [R74.01] Transaminitis (Primary)  [R03.0] Elevated blood pressure reading without diagnosis of hypertension  [R07.89] Chest wall pain          ED Disposition Condition    Discharge         ED Prescriptions     Medication Sig Dispense Start Date End Date Auth. Provider    methocarbamoL (ROBAXIN) 500 MG Tab Take 2 tablets (1,000 mg total) by mouth 3 (three) times daily. for 5 days 31 tablet 4/19/2022 4/24/2022 Steven Bedolla MD        Follow-up Information     Follow up With Specialties Details Why Contact Info    Gwendolyn Upton MD Internal Medicine Schedule an appointment as soon as possible for a visit in 3 days  [address]  [facility] [address]  [phone]      [facility] - Emergency Dept Emergency Medicine  As needed, If symptoms worsen [id number]  [facility] [address]  [phone]           Steven Bedolla MD  04/22/22 1327

## 2022-04-18 NOTE — FIRST PROVIDER EVALUATION
" Emergency Department TeleTriage Encounter Note      CHIEF COMPLAINT    Chief Complaint   Patient presents with    Chest Pain     Both hands tingling, having a lot of anxiety,       VITAL SIGNS   Initial Vitals [04/18/22 1743]   BP Pulse Resp Temp SpO2   (!) 149/104 100 18 98.6 °F (37 °C) 98 %      MAP       --            ALLERGIES    Review of patient's allergies indicates:   Allergen Reactions    Hazelnut Itching, Other (See Comments) and Swelling    [place] Itching, Other (See Comments) and Swelling       PROVIDER TRIAGE NOTE  This is a teletriage evaluation of a 37 y.o. male presenting to the ED complaining of CP intermittently for the past few days.  Returned today with associated SOB and bilateral hands "tingling and locking up."  Pt denies previous similar symptoms.  Reports extensive family cardiac history.     Initial orders will be placed and care will be transferred to an alternate provider when patient is roomed for a full evaluation. Any additional orders and the final disposition will be determined by that provider.           ORDERS  Labs Reviewed   HIV 1 / 2 ANTIBODY   HEPATITIS C ANTIBODY   CBC W/ AUTO DIFFERENTIAL   COMPREHENSIVE METABOLIC PANEL   TROPONIN I   B-TYPE NATRIURETIC PEPTIDE       ED Orders (720h ago, onward)    Start Ordered     Status Ordering Provider    04/18/22 1800 04/18/22 1756  aspirin tablet 325 mg  ED 1 Time         Ordered MERVIN SINGH N.    04/18/22 1756 04/18/22 1756  Vital signs  Every 15 min         Ordered MERVIN SINGH N.    04/18/22 1756 04/18/22 1756  Cardiac Monitoring - Adult  Continuous        Comments: Notify Physician If:    Ordered MERVIN SINGH N.    04/18/22 1756 04/18/22 1756  Pulse Oximetry Continuous  Continuous         Ordered MERVIN SINGH N.    04/18/22 1756 04/18/22 1756  Diet NPO  Diet effective now         Ordered MERVIN SINGH N.    04/18/22 1756 04/18/22 1756  Saline lock IV  Once         Ordered " MERVIN SINGH N.    04/18/22 1756 04/18/22 1756  EKG 12-lead  Once        Comments: Do not perform if previously done during this visit/ triage    Ordered MERVIN SINGH N.    04/18/22 1756 04/18/22 1756  CBC auto differential  STAT         Ordered MERVIN SINGH N.    04/18/22 1756 04/18/22 1756  Comprehensive metabolic panel  STAT         Ordered MERVIN SINGH N.    04/18/22 1756 04/18/22 1756  Troponin I #1  STAT         Ordered MERVIN SINGH N.    04/18/22 1756 04/18/22 1756  B-Type natriuretic peptide (BNP)  STAT         Ordered FRANCISCO MERVIN N.    04/18/22 1756 04/18/22 1756  X-Ray Chest AP Portable  1 time imaging         Ordered FRANCISCO MERVIN N.    04/18/22 1746 04/18/22 1745  EKG 12-lead  Once         Completed by VON WICK on 4/18/2022 at  5:50 PM BAMBI YI    04/18/22 1745 04/18/22 1745  HIV 1/2 Ag/Ab (4th Gen)  STAT         Ordered BAMBI YI    04/18/22 1745 04/18/22 1745  Hepatitis C Antibody  STAT         Ordered BAMBI YI            Virtual Visit Note: The provider triage portion of this emergency department evaluation and documentation was performed via [employer] ([employer]), a HIPAA-compliant telemedicine application, in concert with a tele-presenter in the room. A face to face patient evaluation with one of my colleagues will occur once the patient is placed in an emergency department room.      DISCLAIMER: This note was prepared with [employer] voice recognition transcription software. Garbled syntax, mangled pronouns, and other bizarre constructions may be attributed to that software system.

## 2022-04-19 VITALS
OXYGEN SATURATION: 98 % | WEIGHT: 162 LBS | TEMPERATURE: 98 F | RESPIRATION RATE: 16 BRPM | BODY MASS INDEX: 23.99 KG/M2 | HEIGHT: 69 IN | DIASTOLIC BLOOD PRESSURE: 80 MMHG | HEART RATE: 74 BPM | SYSTOLIC BLOOD PRESSURE: 134 MMHG

## 2022-04-19 LAB
HCV AB SERPL QL IA: NEGATIVE
HIV 1+2 AB+HIV1 P24 AG SERPL QL IA: NEGATIVE
TROPONIN I SERPL DL<=0.01 NG/ML-MCNC: <0.006 NG/ML (ref 0–0.03)

## 2022-04-19 RX ORDER — METHOCARBAMOL 500 MG/1
1000 TABLET, FILM COATED ORAL 3 TIMES DAILY
Qty: 31 TABLET | Refills: 0 | Status: SHIPPED | OUTPATIENT
Start: 2022-04-19 | End: 2022-04-24

## 2022-04-19 NOTE — ED NOTES
Pt presents to [facility] ER c/o midsternal chest pain for approximately one week. Pt reports the pain worsened td, rates pain 8/10. Pt also reports tingling to hands of upper extremities. Pt placed on ED telebox c pulse ox, HR 76 SR at this time, O2 sats WNL on RA. Pt troponin neg, CXR WNL. Pt D-dimer pending. Pt AAOx4, NADN at this time. Pt instructed to call for assistance, verbalizes understanding. Will continue to monitor pt while in the ER.

## 2022-04-19 NOTE — DISCHARGE INSTRUCTIONS
Due to irregularities concerning your liver enzymes, please avoid any Tylenol or alcohol ingestion until repeat laboratory evaluation and discussion with your primary care provider.

## 2022-04-20 ENCOUNTER — OFFICE VISIT (OUTPATIENT)
Dept: INTERNAL MEDICINE | Facility: CLINIC | Age: 37
End: 2022-04-20
Payer: COMMERCIAL

## 2022-04-20 VITALS
DIASTOLIC BLOOD PRESSURE: 86 MMHG | OXYGEN SATURATION: 99 % | BODY MASS INDEX: 23.8 KG/M2 | HEART RATE: 65 BPM | HEIGHT: 69 IN | SYSTOLIC BLOOD PRESSURE: 126 MMHG | WEIGHT: 160.69 LBS

## 2022-04-20 DIAGNOSIS — Z82.49 FAMILY HISTORY OF CARDIOVASCULAR DISEASE: ICD-10-CM

## 2022-04-20 DIAGNOSIS — M94.0 COSTOCHONDRITIS: Primary | ICD-10-CM

## 2022-04-20 DIAGNOSIS — F41.1 GAD (GENERALIZED ANXIETY DISORDER): ICD-10-CM

## 2022-04-20 DIAGNOSIS — G43.109 MIGRAINE EQUIVALENT SYNDROME: ICD-10-CM

## 2022-04-20 DIAGNOSIS — R79.89 ELEVATED LFTS: ICD-10-CM

## 2022-04-20 DIAGNOSIS — H81.01 MENIERE'S DISEASE OF RIGHT EAR: ICD-10-CM

## 2022-04-20 DIAGNOSIS — E78.2 MIXED HYPERLIPIDEMIA: ICD-10-CM

## 2022-04-20 PROCEDURE — 3008F BODY MASS INDEX DOCD: CPT | Mod: CPTII,S$GLB,, | Performed by: NURSE PRACTITIONER

## 2022-04-20 PROCEDURE — 3008F PR BODY MASS INDEX (BMI) DOCUMENTED: ICD-10-PCS | Mod: CPTII,S$GLB,, | Performed by: NURSE PRACTITIONER

## 2022-04-20 PROCEDURE — 99999 PR PBB SHADOW E&M-EST. PATIENT-LVL IV: CPT | Mod: PBBFAC,,, | Performed by: NURSE PRACTITIONER

## 2022-04-20 PROCEDURE — 99214 PR OFFICE/OUTPT VISIT, EST, LEVL IV, 30-39 MIN: ICD-10-PCS | Mod: S$GLB,,, | Performed by: NURSE PRACTITIONER

## 2022-04-20 PROCEDURE — 99214 OFFICE O/P EST MOD 30 MIN: CPT | Mod: S$GLB,,, | Performed by: NURSE PRACTITIONER

## 2022-04-20 PROCEDURE — 3074F PR MOST RECENT SYSTOLIC BLOOD PRESSURE < 130 MM HG: ICD-10-PCS | Mod: CPTII,S$GLB,, | Performed by: NURSE PRACTITIONER

## 2022-04-20 PROCEDURE — 99999 PR PBB SHADOW E&M-EST. PATIENT-LVL IV: ICD-10-PCS | Mod: PBBFAC,,, | Performed by: NURSE PRACTITIONER

## 2022-04-20 PROCEDURE — 3074F SYST BP LT 130 MM HG: CPT | Mod: CPTII,S$GLB,, | Performed by: NURSE PRACTITIONER

## 2022-04-20 PROCEDURE — 1159F MED LIST DOCD IN RCRD: CPT | Mod: CPTII,S$GLB,, | Performed by: NURSE PRACTITIONER

## 2022-04-20 PROCEDURE — 3079F PR MOST RECENT DIASTOLIC BLOOD PRESSURE 80-89 MM HG: ICD-10-PCS | Mod: CPTII,S$GLB,, | Performed by: NURSE PRACTITIONER

## 2022-04-20 PROCEDURE — 1159F PR MEDICATION LIST DOCUMENTED IN MEDICAL RECORD: ICD-10-PCS | Mod: CPTII,S$GLB,, | Performed by: NURSE PRACTITIONER

## 2022-04-20 PROCEDURE — 3079F DIAST BP 80-89 MM HG: CPT | Mod: CPTII,S$GLB,, | Performed by: NURSE PRACTITIONER

## 2022-04-20 RX ORDER — ESCITALOPRAM OXALATE 5 MG/1
5 TABLET ORAL DAILY
Qty: 30 TABLET | Refills: 11 | Status: SHIPPED | OUTPATIENT
Start: 2022-04-20 | End: 2022-09-20 | Stop reason: SDUPTHER

## 2022-04-20 RX ORDER — ALPRAZOLAM 0.25 MG/1
0.25 TABLET ORAL NIGHTLY PRN
Qty: 30 TABLET | Refills: 0 | Status: SHIPPED | OUTPATIENT
Start: 2022-04-20 | End: 2023-11-16 | Stop reason: SDUPTHER

## 2022-04-20 NOTE — PROGRESS NOTES
INTERNAL MEDICINE PROGRESS NOTE    CHIEF COMPLAINT     Chief Complaint   Patient presents with    Follow-up     ER       HPI     Kaden Heller is a 37 y.o. male with migraines, meniere's disease, HLD, and hypertriglyceridemia who presents for an ED follow up visit today.    Here following ED visit 2022    ED-left-sided chest discomfort as well as bilateral shoulder pain and subjective weakness of bilateral hands.  The patient also notes feeling anxious today related to work issues, and was noted to exhibit an elevated blood pressure earlier today which has improved without intervention.   The patient also reports that he was able to complete a 10 K run 2 days prior without significant difficulty    BP elevated in /100    D-dimer elevated- CTA chest - no PE and thoraco pelvic abnormality   CXR normal   liver enzymes elevated - 93 and 189    Moved to [place] in 2021 - runs regularly; family has heart disease - mother had first MI in 30s and grandmother and grandfather both  of heart attacks.     Today he reports continued chest pain and fullness in the sternal area- pain with palpation. Pain is constant. Pain resolved with robaxin.     Anxiety- pt reports work is very stressful right now. Has been feeling very anxious     Anxiety Patient Health Questionnaire (GUSTABO-7)  1. Feeling nervous, anxious, or on edge?: Nearly everyday  2. Not being able to stop or control worrying?: Nearly everyday  3. Worrying too much about different things?: Several days  4. Trouble relaxing?: Nearly everyday  5. Being so restless that it is hard to sit still?: Not at all  6. Becoming easily annoyed or irritable?: Several days  7. Feeling afraid as if something awful might happen?: Several days  GUSTABO-7 Score: 12       Past Medical History:  Past Medical History:   Diagnosis Date    Food allergy 2015    micheal and hazelnut    GUSTABO (generalized anxiety disorder) 2022    Hearing loss 2020    same time tinnitus  "started 24/7    Meniere disease 12/2019    Mixed hyperlipidemia 02/18/2019    Seasonal allergies        Home Medications:  Prior to Admission medications    Medication Sig Start Date End Date Taking? Authorizing Provider   methocarbamoL (ROBAXIN) 500 MG Tab Take 2 tablets (1,000 mg total) by mouth 3 (three) times daily. for 5 days 4/19/22 4/24/22 Yes Steven Bedolla MD   triamterene-hydrochlorothiazide 37.5-25 mg (DYAZIDE) 37.5-25 mg per capsule Take 1 capsule by mouth every morning. 2/25/22 2/25/23 Yes AGUSTIN Saravia MD   meclizine (ANTIVERT) 12.5 mg tablet Take 1 tablet (12.5 mg total) by mouth 2 (two) times daily as needed for Dizziness.  Patient not taking: Reported on 4/20/2022 2/25/22   AGUSTIN Saravia MD       Review of Systems:  Review of Systems   Constitutional: Negative for activity change, appetite change, chills, diaphoresis, fatigue and fever.   Eyes: Negative for visual disturbance.   Respiratory: Negative for cough, shortness of breath and wheezing.    Cardiovascular: Positive for chest pain. Negative for palpitations and leg swelling.   Gastrointestinal: Negative for abdominal pain, blood in stool, constipation, diarrhea, nausea, rectal pain and vomiting.   Genitourinary: Negative.    Musculoskeletal: Negative for arthralgias.   Skin: Negative for rash.   Neurological: Positive for dizziness and headaches. Negative for light-headedness.   Psychiatric/Behavioral: Negative for agitation, dysphoric mood and sleep disturbance. The patient is nervous/anxious.        Health Maintainence:   Immunizations:  Health Maintenance       Date Due Completion Date    TETANUS VACCINE Never done ---    COVID-19 Vaccine (4 - Booster for Pfizer series) 01/14/2022 10/14/2021    Lipid Panel 05/20/2024 5/20/2019           PHYSICAL EXAM     BP (!) 140/90 (BP Location: Right arm, Patient Position: Sitting, BP Method: Medium (Manual))   Pulse 65   Ht 5' 9" (1.753 m)   Wt 72.9 kg (160 lb 11.5 oz)   SpO2 99%   " BMI 23.73 kg/m²     Physical Exam  Vitals reviewed.   Constitutional:       Appearance: He is well-developed.   HENT:      Head: Normocephalic.      Right Ear: External ear normal.      Left Ear: External ear normal.      Nose: Nose normal.      Mouth/Throat:      Pharynx: No oropharyngeal exudate.   Eyes:      Pupils: Pupils are equal, round, and reactive to light.   Neck:      Thyroid: No thyromegaly.      Vascular: No JVD.      Trachea: No tracheal deviation.   Cardiovascular:      Rate and Rhythm: Normal rate and regular rhythm.      Heart sounds: No murmur heard.    No friction rub. No gallop.   Pulmonary:      Effort: No respiratory distress.      Breath sounds: Normal breath sounds. No wheezing or rales.   Chest:      Chest wall: No tenderness.   Abdominal:      General: Bowel sounds are normal. There is no distension.      Palpations: Abdomen is soft.      Tenderness: There is no abdominal tenderness.   Musculoskeletal:         General: No tenderness. Normal range of motion.   Lymphadenopathy:      Cervical: No cervical adenopathy.   Skin:     General: Skin is warm and dry.      Findings: No rash.   Neurological:      Mental Status: He is alert and oriented to person, place, and time.   Psychiatric:         Behavior: Behavior normal.         LABS     No results found for: LABA1C, HGBA1C  CMP  Sodium   Date Value Ref Range Status   04/18/2022 137 136 - 145 mmol/L Final     Potassium   Date Value Ref Range Status   04/18/2022 3.5 3.5 - 5.1 mmol/L Final     Chloride   Date Value Ref Range Status   04/18/2022 97 95 - 110 mmol/L Final     CO2   Date Value Ref Range Status   04/18/2022 27 23 - 29 mmol/L Final     Glucose   Date Value Ref Range Status   04/18/2022 94 70 - 110 mg/dL Final     BUN   Date Value Ref Range Status   04/18/2022 10 6 - 20 mg/dL Final     Creatinine   Date Value Ref Range Status   04/18/2022 0.8 0.5 - 1.4 mg/dL Final     Calcium   Date Value Ref Range Status   04/18/2022 10.5 8.7 - 10.5  mg/dL Final     Total Protein   Date Value Ref Range Status   04/18/2022 8.6 (H) 6.0 - 8.4 g/dL Final     Albumin   Date Value Ref Range Status   04/18/2022 4.8 3.5 - 5.2 g/dL Final     Total Bilirubin   Date Value Ref Range Status   04/18/2022 1.0 0.1 - 1.0 mg/dL Final     Comment:     For infants and newborns, interpretation of results should be based  on gestational age, weight and in agreement with clinical  observations.    Premature Infant recommended reference ranges:  Up to 24 hours.............<8.0 mg/dL  Up to 48 hours............<12.0 mg/dL  3-5 days..................<15.0 mg/dL  6-29 days.................<15.0 mg/dL       Alkaline Phosphatase   Date Value Ref Range Status   04/18/2022 68 55 - 135 U/L Final     AST   Date Value Ref Range Status   04/18/2022 93 (H) 10 - 40 U/L Final     ALT   Date Value Ref Range Status   04/18/2022 189 (H) 10 - 44 U/L Final     Anion Gap   Date Value Ref Range Status   04/18/2022 13 8 - 16 mmol/L Final     eGFR if    Date Value Ref Range Status   04/18/2022 >60.0 >60 mL/min/1.73 m^2 Final     eGFR if non    Date Value Ref Range Status   04/18/2022 >60.0 >60 mL/min/1.73 m^2 Final     Comment:     Calculation used to obtain the estimated glomerular filtration  rate (eGFR) is the CKD-EPI equation.        Lab Results   Component Value Date    WBC 9.09 04/18/2022    HGB 16.7 04/18/2022    HCT 48.2 04/18/2022    MCV 91 04/18/2022     04/18/2022     Lab Results   Component Value Date    CHOL 271 (H) 05/20/2019    CHOL 211 (H) 02/05/2019     Lab Results   Component Value Date    HDL 43 05/20/2019    HDL 47 02/05/2019     Lab Results   Component Value Date    LDLCALC 167.0 (H) 05/20/2019    LDLCALC 98.6 02/05/2019     Lab Results   Component Value Date    TRIG 305 (H) 05/20/2019    TRIG 327 (H) 02/05/2019     Lab Results   Component Value Date    CHOLHDL 15.9 (L) 05/20/2019    CHOLHDL 22.3 02/05/2019     Lab Results   Component Value Date     TSH 1.451 04/18/2022       ASSESSMENT/PLAN     Kaden Heller is a 37 y.o. male     Costochondritis- reviewed ED imaging and labs. Will cont aleve as needed for pain and methocarbamol for refractory pain (do not take with etoh or xanxa)     GUSTABO (generalized anxiety disorder)- steiner tart lexapro 5mg daily and xanax for panic attacks.   -     EScitalopram oxalate (LEXAPRO) 5 MG Tab; Take 1 tablet (5 mg total) by mouth once daily.  Dispense: 30 tablet; Refill: 11  -     ALPRAZolam (XANAX) 0.25 MG tablet; Take 1 tablet (0.25 mg total) by mouth nightly as needed (panic attack).  Dispense: 30 tablet; Refill: 0    Family history of cardiovascular disease- repeat FLP and refer to cardiology for advance cardiac work up with coronary calcium score   -     Lipid Panel; Future; Expected date: 04/20/2022  -     Ambulatory referral/consult to Cardiology; Future; Expected date: 04/27/2022    Mixed hyperlipidemia  -     Lipid Panel; Future; Expected date: 04/20/2022    Meniere's disease of right ear- cont current meds. F/u with ENT     Migraine equivalent syndrome- stable. Will cont current meds.     Elevated LFTs- likely from 10k RUN AND ETOH USE - will repeat next week. Increase fluids and avoid etoh   -     Hepatic Function Panel; Future; Expected date: 04/20/2022    Follow up with PCP    Patient education provided from [facility]. Patient was counseled on when and how to seek emergent care.       Lacey Mcclellan-Seth LOVE, APRN, FNP-c   Department of Internal Medicine - [facility]  3:47 PM

## 2022-04-20 NOTE — PATIENT INSTRUCTIONS
Uses aleve as needed for chest discomfort     Start lexapro 5mg nightly   May use xanax sparingly for panic attacks or insomnia     Repeat hepatic panel on Tuesday

## 2022-04-26 ENCOUNTER — LAB VISIT (OUTPATIENT)
Dept: LAB | Facility: HOSPITAL | Age: 37
End: 2022-04-26
Payer: COMMERCIAL

## 2022-04-26 DIAGNOSIS — R79.89 ELEVATED LFTS: ICD-10-CM

## 2022-04-26 DIAGNOSIS — E78.2 MIXED HYPERLIPIDEMIA: ICD-10-CM

## 2022-04-26 DIAGNOSIS — Z82.49 FAMILY HISTORY OF CARDIOVASCULAR DISEASE: ICD-10-CM

## 2022-04-26 LAB
ALBUMIN SERPL BCP-MCNC: 4.3 G/DL (ref 3.5–5.2)
ALP SERPL-CCNC: 54 U/L (ref 55–135)
ALT SERPL W/O P-5'-P-CCNC: 127 U/L (ref 10–44)
AST SERPL-CCNC: 63 U/L (ref 10–40)
BILIRUB DIRECT SERPL-MCNC: 0.3 MG/DL (ref 0.1–0.3)
BILIRUB SERPL-MCNC: 0.6 MG/DL (ref 0.1–1)
CHOLEST SERPL-MCNC: 197 MG/DL (ref 120–199)
CHOLEST/HDLC SERPL: 4.8 {RATIO} (ref 2–5)
HDLC SERPL-MCNC: 41 MG/DL (ref 40–75)
HDLC SERPL: 20.8 % (ref 20–50)
LDLC SERPL CALC-MCNC: 127.8 MG/DL (ref 63–159)
NONHDLC SERPL-MCNC: 156 MG/DL
PROT SERPL-MCNC: 7.4 G/DL (ref 6–8.4)
TRIGL SERPL-MCNC: 141 MG/DL (ref 30–150)

## 2022-04-26 PROCEDURE — 80061 LIPID PANEL: CPT | Performed by: NURSE PRACTITIONER

## 2022-04-26 PROCEDURE — 80076 HEPATIC FUNCTION PANEL: CPT | Performed by: NURSE PRACTITIONER

## 2022-04-26 PROCEDURE — 36415 COLL VENOUS BLD VENIPUNCTURE: CPT | Performed by: NURSE PRACTITIONER

## 2022-05-02 ENCOUNTER — OFFICE VISIT (OUTPATIENT)
Dept: CARDIOLOGY | Facility: CLINIC | Age: 37
End: 2022-05-02
Payer: COMMERCIAL

## 2022-05-02 ENCOUNTER — PATIENT MESSAGE (OUTPATIENT)
Dept: INTERNAL MEDICINE | Facility: CLINIC | Age: 37
End: 2022-05-02
Payer: COMMERCIAL

## 2022-05-02 VITALS
HEART RATE: 67 BPM | DIASTOLIC BLOOD PRESSURE: 96 MMHG | OXYGEN SATURATION: 98 % | SYSTOLIC BLOOD PRESSURE: 147 MMHG | BODY MASS INDEX: 23.77 KG/M2 | WEIGHT: 160.5 LBS | HEIGHT: 69 IN

## 2022-05-02 DIAGNOSIS — E78.1 HYPERTRIGLYCERIDEMIA: ICD-10-CM

## 2022-05-02 DIAGNOSIS — R07.89 OTHER CHEST PAIN: Primary | ICD-10-CM

## 2022-05-02 DIAGNOSIS — E78.2 MIXED HYPERLIPIDEMIA: ICD-10-CM

## 2022-05-02 DIAGNOSIS — Z82.49 FAMILY HISTORY OF CARDIOVASCULAR DISEASE: ICD-10-CM

## 2022-05-02 DIAGNOSIS — Z82.49 FAMILY HISTORY OF PREMATURE CAD: ICD-10-CM

## 2022-05-02 PROCEDURE — 3077F SYST BP >= 140 MM HG: CPT | Mod: CPTII,S$GLB,, | Performed by: INTERNAL MEDICINE

## 2022-05-02 PROCEDURE — 99204 PR OFFICE/OUTPT VISIT, NEW, LEVL IV, 45-59 MIN: ICD-10-PCS | Mod: S$GLB,,, | Performed by: INTERNAL MEDICINE

## 2022-05-02 PROCEDURE — 1160F RVW MEDS BY RX/DR IN RCRD: CPT | Mod: CPTII,S$GLB,, | Performed by: INTERNAL MEDICINE

## 2022-05-02 PROCEDURE — 3077F PR MOST RECENT SYSTOLIC BLOOD PRESSURE >= 140 MM HG: ICD-10-PCS | Mod: CPTII,S$GLB,, | Performed by: INTERNAL MEDICINE

## 2022-05-02 PROCEDURE — 1160F PR REVIEW ALL MEDS BY PRESCRIBER/CLIN PHARMACIST DOCUMENTED: ICD-10-PCS | Mod: CPTII,S$GLB,, | Performed by: INTERNAL MEDICINE

## 2022-05-02 PROCEDURE — 3008F BODY MASS INDEX DOCD: CPT | Mod: CPTII,S$GLB,, | Performed by: INTERNAL MEDICINE

## 2022-05-02 PROCEDURE — 3080F PR MOST RECENT DIASTOLIC BLOOD PRESSURE >= 90 MM HG: ICD-10-PCS | Mod: CPTII,S$GLB,, | Performed by: INTERNAL MEDICINE

## 2022-05-02 PROCEDURE — 1159F MED LIST DOCD IN RCRD: CPT | Mod: CPTII,S$GLB,, | Performed by: INTERNAL MEDICINE

## 2022-05-02 PROCEDURE — 99999 PR PBB SHADOW E&M-EST. PATIENT-LVL IV: ICD-10-PCS | Mod: PBBFAC,,, | Performed by: INTERNAL MEDICINE

## 2022-05-02 PROCEDURE — 3008F PR BODY MASS INDEX (BMI) DOCUMENTED: ICD-10-PCS | Mod: CPTII,S$GLB,, | Performed by: INTERNAL MEDICINE

## 2022-05-02 PROCEDURE — 3080F DIAST BP >= 90 MM HG: CPT | Mod: CPTII,S$GLB,, | Performed by: INTERNAL MEDICINE

## 2022-05-02 PROCEDURE — 1159F PR MEDICATION LIST DOCUMENTED IN MEDICAL RECORD: ICD-10-PCS | Mod: CPTII,S$GLB,, | Performed by: INTERNAL MEDICINE

## 2022-05-02 PROCEDURE — 99999 PR PBB SHADOW E&M-EST. PATIENT-LVL IV: CPT | Mod: PBBFAC,,, | Performed by: INTERNAL MEDICINE

## 2022-05-02 PROCEDURE — 99204 OFFICE O/P NEW MOD 45 MIN: CPT | Mod: S$GLB,,, | Performed by: INTERNAL MEDICINE

## 2022-05-02 NOTE — PROGRESS NOTES
Subjective:   Patient ID:  Kaden Heller is a 37 y.o. male is a new patient who presents for evaluation of Family history of cardiovascular disease and Transaminitis (Ed f/u 4/18/22)    Referral for at risk for CV disease  Kaden Heller is a 33 y.o. male pt without significant PMHx presents for initial CV evaluation due to strong FH of early CVD.    He had a recent appt with Dr. Granda and discussed significant CV disease. He had recent labwork with elevated cholesterol, TG - 327.   [place] marathon recently. Saw cardiologist in past in , had occasional irregular heart beating at night when he rolls over - 7 or 8 years ago.   Had Holter which was negative. Still feels a flutter once or twice. Occ snoring.  Had syncopal episodes last year due to running, improved with hydration.    Patient feels no chest pain, no sob, no leg swelling, no PND, no dizziness,  no CNS symptoms.   Patient has fairly good exercise tolerance. 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    Patient is compliant with medications.   FH - Mother - MI at age 36, had 2, second at 41 - smoker; her mother - MI at 34, father - 52; maternal uncle - 14 MI's   ECG - NSR with sinus arrythmia      Echo 2019   1 - No wall motion abnormalities.     2 - Normal left ventricular systolic function (EF 55-60%).     3 - Normal left ventricular diastolic function.     4 - Normal right ventricular systolic function .     5 - The estimated PA systolic pressure is 25 mmHg.     HPI:  Patient runs occasionally  Recent chest pain for which he was in the ER and likely due to anxiety and was started on anxiolytic and his chest pain has improved  Gets occasional palpitations    ER visit:  37-year-old man with comorbidities of unilateral hearing loss with Meniere's disease, hyperlipidemia, as well as allergies presents to the ED for evaluation of left-sided chest discomfort as well as bilateral shoulder pain and subjective weakness of bilateral hands.  The patient also  "notes feeling anxious today related to work issues, and was noted to exhibit an elevated blood pressure earlier today which has improved without intervention.  At the time of my exam, he describes mild to moderate left-sided chest pain with extremity weakness.  The patient also reports that he was able to complete a 10 K run 2 days ago without significant difficulty.  He denies any associated fevers, chills, nausea, vomiting, or associated diarrhea.  He does report previous episodes of palpitations and chest discomfort in the past the workup for which did not reveal any evidence of pathology.       EKG:  Normal sinus rhythm with sinus arrhythmia  Rightward axis  Nonspecific ST-t wave abnormality  Abnormal ECG  When compared    Patient Active Problem List   Diagnosis    Family history of cardiovascular disease    Mixed hyperlipidemia    Hypertriglyceridemia    Sensorineural hearing loss (SNHL) of right ear with unrestricted hearing of left ear    Tinnitus of right ear    Meniere's disease of right ear    Migraine equivalent syndrome    Allergic rhinitis    Nasal septal deviation    GUSTABO (generalized anxiety disorder)     BP (!) 147/96 (BP Location: Left arm, Patient Position: Sitting, BP Method: Medium (Automatic))   Pulse 67   Ht 5' 9" (1.753 m)   Wt 72.8 kg (160 lb 7.9 oz)   SpO2 98%   BMI 23.70 kg/m²   Body mass index is 23.7 kg/m².  CrCl cannot be calculated (Patient's most recent lab result is older than the maximum 7 days allowed.).    Lab Results   Component Value Date     04/18/2022    K 3.5 04/18/2022    CL 97 04/18/2022    CO2 27 04/18/2022    BUN 10 04/18/2022    CREATININE 0.8 04/18/2022    GLU 94 04/18/2022    AST 63 (H) 04/26/2022     (H) 04/26/2022    ALBUMIN 4.3 04/26/2022    PROT 7.4 04/26/2022    BILITOT 0.6 04/26/2022    WBC 9.09 04/18/2022    HGB 16.7 04/18/2022    HCT 48.2 04/18/2022    MCV 91 04/18/2022     04/18/2022    TSH 1.451 04/18/2022    CHOL 197 " 04/26/2022    HDL 41 04/26/2022    LDLCALC 127.8 04/26/2022    TRIG 141 04/26/2022       Current Outpatient Medications   Medication Sig    ALPRAZolam (XANAX) 0.25 MG tablet Take 1 tablet (0.25 mg total) by mouth nightly as needed (panic attack).    EScitalopram oxalate (LEXAPRO) 5 MG Tab Take 1 tablet (5 mg total) by mouth once daily.    loratadine (CLARITIN) 5 mg chewable tablet Take 5 mg by mouth once daily.    meclizine (ANTIVERT) 12.5 mg tablet Take 1 tablet (12.5 mg total) by mouth 2 (two) times daily as needed for Dizziness.    triamterene-hydrochlorothiazide 37.5-25 mg (DYAZIDE) 37.5-25 mg per capsule Take 1 capsule by mouth every morning.     No current facility-administered medications for this visit.       Review of Systems   Constitutional: Negative for chills, decreased appetite, malaise/fatigue, night sweats, weight gain and weight loss.   Eyes: Negative for blurred vision, double vision, visual disturbance and visual halos.   Cardiovascular: Positive for chest pain. Negative for claudication, cyanosis, dyspnea on exertion, irregular heartbeat, leg swelling, near-syncope, orthopnea, palpitations, paroxysmal nocturnal dyspnea and syncope.   Respiratory: Negative for cough, hemoptysis, snoring, sputum production and wheezing.    Endocrine: Negative for cold intolerance, heat intolerance, polydipsia and polyphagia.   Hematologic/Lymphatic: Negative for adenopathy and bleeding problem. Does not bruise/bleed easily.   Skin: Negative for flushing, itching, poor wound healing and rash.   Musculoskeletal: Negative for arthritis, back pain, falls, gout, joint pain, joint swelling, muscle cramps, muscle weakness, myalgias, neck pain and stiffness.   Gastrointestinal: Negative for bloating, abdominal pain, anorexia, diarrhea, dysphagia, excessive appetite, flatus, hematemesis, jaundice, melena and nausea.   Genitourinary: Negative for hesitancy and incomplete emptying.   Neurological: Negative for aphonia,  brief paralysis, difficulty with concentration, disturbances in coordination, excessive daytime sleepiness, dizziness, focal weakness, light-headedness, loss of balance and weakness.   Psychiatric/Behavioral: Negative for altered mental status, depression, hallucinations, hypervigilance, memory loss, substance abuse and suicidal ideas. The patient does not have insomnia and is not nervous/anxious.        Objective:   Physical Exam  Constitutional:       General: He is not in acute distress.     Appearance: He is well-developed. He is not diaphoretic.   HENT:      Head: Normocephalic and atraumatic.      Nose: Nose normal.      Mouth/Throat:      Pharynx: No oropharyngeal exudate.   Eyes:      General: No scleral icterus.        Right eye: No discharge.         Left eye: No discharge.      Conjunctiva/sclera: Conjunctivae normal.      Pupils: Pupils are equal, round, and reactive to light.   Neck:      Thyroid: No thyromegaly.      Vascular: No JVD.      Trachea: No tracheal deviation.   Cardiovascular:      Rate and Rhythm: Normal rate and regular rhythm.      Pulses: Intact distal pulses.      Heart sounds: Normal heart sounds. No murmur heard.    No friction rub. No gallop.   Pulmonary:      Effort: Pulmonary effort is normal. No respiratory distress.      Breath sounds: Normal breath sounds. No stridor. No wheezing or rales.   Chest:      Chest wall: No tenderness.   Abdominal:      General: Bowel sounds are normal. There is no distension.      Palpations: Abdomen is soft. There is no mass.      Tenderness: There is no abdominal tenderness.   Musculoskeletal:         General: No tenderness.      Cervical back: Normal range of motion and neck supple.   Lymphadenopathy:      Cervical: No cervical adenopathy.   Skin:     General: Skin is warm.      Coloration: Skin is not pale.      Findings: No erythema or rash.   Neurological:      Mental Status: He is alert and oriented to person, place, and time.      Cranial  Nerves: No cranial nerve deficit.      Motor: No abnormal muscle tone.      Coordination: Coordination normal.      Deep Tendon Reflexes: Reflexes normal.   Psychiatric:         Behavior: Behavior normal.         Thought Content: Thought content normal.         Judgment: Judgment normal.         Assessment:     1. Other chest pain    2. Family history of cardiovascular disease    3. Hypertriglyceridemia    4. Mixed hyperlipidemia    5. Family history of premature CAD        Plan:   Non cardiac chest pain. Patient's pain is better after treatment with anti anxiety. Will discuss  initiation of BP medications.   Kaden was seen today for family history of cardiovascular disease and transaminitis.    Diagnoses and all orders for this visit:    Other chest pain    Family history of cardiovascular disease  -     CT Calcium Scoring Cardiac; Future  -     Cancel: CRP, High Sensitivity; Future  -     LIPOPROTEIN A (LPA); Future  -     C-REACTIVE PROTEIN; Future    Hypertriglyceridemia    Mixed hyperlipidemia    Family history of premature CAD  -     Ambulatory referral/consult to Cardiology      RTC 11 wks.

## 2022-05-09 ENCOUNTER — PATIENT MESSAGE (OUTPATIENT)
Dept: CARDIOLOGY | Facility: CLINIC | Age: 37
End: 2022-05-09
Payer: COMMERCIAL

## 2022-05-20 ENCOUNTER — HOSPITAL ENCOUNTER (OUTPATIENT)
Dept: RADIOLOGY | Facility: HOSPITAL | Age: 37
Discharge: HOME | End: 2022-05-20
Attending: INTERNAL MEDICINE
Payer: COMMERCIAL

## 2022-05-20 DIAGNOSIS — Z82.49 FAMILY HISTORY OF CARDIOVASCULAR DISEASE: ICD-10-CM

## 2022-05-20 PROCEDURE — 75571 CT HRT W/O DYE W/CA TEST: CPT | Mod: TC

## 2022-05-20 PROCEDURE — 75571 CT CALCIUM SCORING CARDIAC: ICD-10-PCS | Mod: 26,,, | Performed by: RADIOLOGY

## 2022-05-20 PROCEDURE — 75571 CT HRT W/O DYE W/CA TEST: CPT | Mod: 26,,, | Performed by: RADIOLOGY

## 2022-05-23 ENCOUNTER — TELEPHONE (OUTPATIENT)
Dept: CARDIOLOGY | Facility: CLINIC | Age: 37
End: 2022-05-23
Payer: COMMERCIAL

## 2022-07-12 ENCOUNTER — OFFICE VISIT (OUTPATIENT)
Dept: OTOLARYNGOLOGY | Facility: CLINIC | Age: 37
End: 2022-07-12
Payer: COMMERCIAL

## 2022-07-12 VITALS
WEIGHT: 162.94 LBS | DIASTOLIC BLOOD PRESSURE: 95 MMHG | SYSTOLIC BLOOD PRESSURE: 138 MMHG | BODY MASS INDEX: 24.06 KG/M2 | HEART RATE: 67 BPM | TEMPERATURE: 98 F

## 2022-07-12 DIAGNOSIS — H90.41 SENSORINEURAL HEARING LOSS (SNHL) OF RIGHT EAR WITH UNRESTRICTED HEARING OF LEFT EAR: ICD-10-CM

## 2022-07-12 DIAGNOSIS — H81.01 MENIERE'S DISEASE OF RIGHT EAR: Primary | ICD-10-CM

## 2022-07-12 DIAGNOSIS — J34.2 NASAL SEPTAL DEVIATION: ICD-10-CM

## 2022-07-12 DIAGNOSIS — J30.9 ALLERGIC RHINITIS, UNSPECIFIED SEASONALITY, UNSPECIFIED TRIGGER: ICD-10-CM

## 2022-07-12 DIAGNOSIS — H93.11 TINNITUS OF RIGHT EAR: ICD-10-CM

## 2022-07-12 DIAGNOSIS — G43.109 MIGRAINE EQUIVALENT SYNDROME: ICD-10-CM

## 2022-07-12 PROCEDURE — 99999 PR PBB SHADOW E&M-EST. PATIENT-LVL III: CPT | Mod: PBBFAC,,, | Performed by: SPECIALIST

## 2022-07-12 PROCEDURE — 99999 PR PBB SHADOW E&M-EST. PATIENT-LVL III: ICD-10-PCS | Mod: PBBFAC,,, | Performed by: SPECIALIST

## 2022-07-12 PROCEDURE — 99214 PR OFFICE/OUTPT VISIT, EST, LEVL IV, 30-39 MIN: ICD-10-PCS | Mod: 25,S$GLB,, | Performed by: SPECIALIST

## 2022-07-12 PROCEDURE — 99214 OFFICE O/P EST MOD 30 MIN: CPT | Mod: 25,S$GLB,, | Performed by: SPECIALIST

## 2022-07-12 PROCEDURE — 1160F PR REVIEW ALL MEDS BY PRESCRIBER/CLIN PHARMACIST DOCUMENTED: ICD-10-PCS | Mod: CPTII,S$GLB,, | Performed by: SPECIALIST

## 2022-07-12 PROCEDURE — 1160F RVW MEDS BY RX/DR IN RCRD: CPT | Mod: CPTII,S$GLB,, | Performed by: SPECIALIST

## 2022-07-12 PROCEDURE — 96372 THER/PROPH/DIAG INJ SC/IM: CPT | Mod: S$GLB,,, | Performed by: SPECIALIST

## 2022-07-12 PROCEDURE — 3080F DIAST BP >= 90 MM HG: CPT | Mod: CPTII,S$GLB,, | Performed by: SPECIALIST

## 2022-07-12 PROCEDURE — 96372 PR INJECTION,THERAP/PROPH/DIAG2ST, IM OR SUBCUT: ICD-10-PCS | Mod: S$GLB,,, | Performed by: SPECIALIST

## 2022-07-12 PROCEDURE — 3008F BODY MASS INDEX DOCD: CPT | Mod: CPTII,S$GLB,, | Performed by: SPECIALIST

## 2022-07-12 PROCEDURE — 3008F PR BODY MASS INDEX (BMI) DOCUMENTED: ICD-10-PCS | Mod: CPTII,S$GLB,, | Performed by: SPECIALIST

## 2022-07-12 PROCEDURE — 1159F PR MEDICATION LIST DOCUMENTED IN MEDICAL RECORD: ICD-10-PCS | Mod: CPTII,S$GLB,, | Performed by: SPECIALIST

## 2022-07-12 PROCEDURE — 1159F MED LIST DOCD IN RCRD: CPT | Mod: CPTII,S$GLB,, | Performed by: SPECIALIST

## 2022-07-12 PROCEDURE — 3080F PR MOST RECENT DIASTOLIC BLOOD PRESSURE >= 90 MM HG: ICD-10-PCS | Mod: CPTII,S$GLB,, | Performed by: SPECIALIST

## 2022-07-12 PROCEDURE — 3075F SYST BP GE 130 - 139MM HG: CPT | Mod: CPTII,S$GLB,, | Performed by: SPECIALIST

## 2022-07-12 PROCEDURE — 3075F PR MOST RECENT SYSTOLIC BLOOD PRESS GE 130-139MM HG: ICD-10-PCS | Mod: CPTII,S$GLB,, | Performed by: SPECIALIST

## 2022-07-12 RX ORDER — MOMETASONE FUROATE 50 UG/1
2 SPRAY, METERED NASAL DAILY
Qty: 17 G | Refills: 11 | Status: SHIPPED | OUTPATIENT
Start: 2022-07-12

## 2022-07-12 RX ORDER — FLUTICASONE PROPIONATE 50 MCG
2 SPRAY, SUSPENSION (ML) NASAL DAILY
Qty: 16 G | Refills: 11 | Status: SHIPPED | OUTPATIENT
Start: 2022-07-12 | End: 2022-07-12 | Stop reason: SINTOL

## 2022-07-12 RX ORDER — TRIAMCINOLONE ACETONIDE 40 MG/ML
40 INJECTION, SUSPENSION INTRA-ARTICULAR; INTRAMUSCULAR ONCE
Status: COMPLETED | OUTPATIENT
Start: 2022-07-12 | End: 2022-07-12

## 2022-07-12 RX ADMIN — TRIAMCINOLONE ACETONIDE 40 MG: 40 INJECTION, SUSPENSION INTRA-ARTICULAR; INTRAMUSCULAR at 02:07

## 2022-07-12 NOTE — PROGRESS NOTES
Subjective:       Patient ID: Kaden Heller is a 37 y.o. male.    Chief Complaint: Follow-up (With ear ringing)    The patient is returning for follow-up visit.  It has been 3 months since I last saw him.  There are multiple issues to discuss:  1. Meniere's disease:  In the last few weeks he has had more unsteadiness.  In general his balance has been very stable and much improved since his last visit.  The problem me is having now is much less severe in degree than that which she presented with when I 1st saw him.  He does not believe he has had a significant change in diet or been consuming significant amounts of salt.  He does drink 1 caffeinated beverage every morning.  2. Tinnitus:  His tinnitus continues.  It is cyst much less loud than at last visit.  3. Chest pain:  Since his last visit he had an episode of severe chest pain that brought him to the emergency room.  The ultimate diagnosis was anxiety.  He has been started on Lexapro which she takes daily.  He has alprazolam to use on an as-needed basis.  He rarely takes it.  4. Allergies:  He has been having a flare of allergy symptoms over the last few weeks.  He is having nasal congestion, stuffiness and postnasal drip.  He is using loratadine to control symptoms.  Nasal secretions are clear.        Review of Systems   Constitutional: Positive for appetite change and fatigue. Negative for activity change and unexpected weight change.   HENT: Positive for ear pain (, acute otitis media/ear infections), hearing loss, postnasal drip, sinus pressure/congestion and tinnitus. Negative for ear discharge, facial swelling, mouth sores, rhinorrhea, sneezing, trouble swallowing and voice change.    Eyes: Negative.  Negative for photophobia, pain, discharge, redness, itching and visual disturbance.        Floaters   Respiratory: Positive for wheezing. Negative for apnea, choking and shortness of breath.         Snoring   Cardiovascular: Positive for chest pain. Negative  for palpitations.   Gastrointestinal: Positive for diarrhea. Negative for abdominal distention.   Endocrine: Negative.    Genitourinary: Negative.    Musculoskeletal: Positive for myalgias. Negative for neck stiffness.   Integumentary:  Negative for color change and pallor. Negative.   Allergic/Immunologic: Positive for environmental allergies (Seasonal allergies). Negative for food allergies.   Neurological: Positive for dizziness, weakness and light-headedness. Negative for facial asymmetry and speech difficulty.   Hematological: Negative.  Negative for adenopathy. Does not bruise/bleed easily.   Psychiatric/Behavioral: Positive for sleep disturbance. Negative for agitation, confusion and decreased concentration. The patient is nervous/anxious.          Objective:      Physical Exam  Vitals and nursing note reviewed.   Constitutional:       General: He is awake.      Appearance: Normal appearance. He is well-developed, well-groomed and normal weight.   HENT:      Head: Normocephalic.      Jaw: There is normal jaw occlusion.      Salivary Glands: Right salivary gland is not diffusely enlarged. Left salivary gland is not diffusely enlarged.      Right Ear: Hearing, ear canal and external ear normal. Tympanic membrane is retracted. Tympanic membrane has decreased mobility (Non mobile on pneumatic otoscopy).      Left Ear: Hearing, ear canal and external ear normal. Tympanic membrane is retracted. Tympanic membrane has decreased mobility ( decreased mobility on pneumatic otoscopy).      Nose: Septal deviation ( to the right), mucosal edema (cyanotic, boggy inferior turbinates bilaterally) and rhinorrhea (clear mucus bilaterally) present. No nasal deformity. Rhinorrhea is clear.      Right Turbinates: Enlarged and pale.      Left Turbinates: Enlarged and pale.      Mouth/Throat:      Lips: No lesions.      Mouth: No oral lesions.      Dentition: No gum lesions.      Tongue: No lesions.      Palate: No mass and lesions.       Pharynx: Oropharynx is clear. Uvula midline.      Tonsils: No tonsillar exudate. 2+ on the right. 2+ on the left.   Eyes:      General: Lids are normal.         Right eye: No discharge.         Left eye: No discharge.      Conjunctiva/sclera:      Right eye: Right conjunctiva is not injected. No exudate.     Left eye: Left conjunctiva is not injected. No exudate.     Pupils: Pupils are equal, round, and reactive to light.   Neck:      Thyroid: No thyroid mass or thyromegaly.      Vascular: No carotid bruit.      Trachea: Trachea normal. No tracheal deviation.   Cardiovascular:      Rate and Rhythm: Normal rate and regular rhythm.      Pulses: Normal pulses.      Heart sounds: Normal heart sounds.   Pulmonary:      Effort: Pulmonary effort is normal.      Breath sounds: Normal breath sounds. No stridor. No decreased breath sounds, wheezing, rhonchi or rales.   Abdominal:      General: Bowel sounds are normal.      Palpations: Abdomen is soft.      Tenderness: There is no abdominal tenderness.   Musculoskeletal:         General: Normal range of motion.      Cervical back: Normal range of motion. No muscular tenderness.   Lymphadenopathy:      Head:      Right side of head: No submental, submandibular, preauricular, posterior auricular or occipital adenopathy.      Left side of head: No submental, submandibular, preauricular, posterior auricular or occipital adenopathy.      Cervical: No cervical adenopathy.   Skin:     General: Skin is warm and dry.      Findings: No petechiae or rash.      Nails: There is no clubbing.   Neurological:      Mental Status: He is alert and oriented to person, place, and time.      Cranial Nerves: No cranial nerve deficit.      Sensory: No sensory deficit.      Gait: Gait normal.      Comments: Neuro otologic-no nystagmus, normal gait, Romberg negative, tandem Romberg for unsteady   Psychiatric:         Mood and Affect: Mood and affect normal.         Speech: Speech normal.          Behavior: Behavior normal. Behavior is cooperative.         Thought Content: Thought content normal.         Judgment: Judgment normal.           Assessment:       Problem List Items Addressed This Visit        Neuro    Migraine equivalent syndrome       ENT    Sensorineural hearing loss (SNHL) of right ear with unrestricted hearing of left ear    Tinnitus of right ear    Meniere's disease of right ear - Primary    Allergic rhinitis    Nasal septal deviation          Plan:       I  will have the patient try a nasal steroid.  In the past he has developed headaches when he used fluticasone so I will use mometosone in its place.  He will use both the nasal steroid and loratadine on a daily basis.  If his level of symptoms is not improved he will contact me.  If he is doing well I will recheck him in November.

## 2022-07-18 ENCOUNTER — LAB VISIT (OUTPATIENT)
Dept: LAB | Facility: HOSPITAL | Age: 37
End: 2022-07-18
Attending: INTERNAL MEDICINE
Payer: COMMERCIAL

## 2022-07-18 DIAGNOSIS — Z82.49 FAMILY HISTORY OF CARDIOVASCULAR DISEASE: ICD-10-CM

## 2022-07-18 LAB — CRP SERPL-MCNC: 2 MG/L (ref 0–8.2)

## 2022-07-18 PROCEDURE — 83695 ASSAY OF LIPOPROTEIN(A): CPT | Performed by: INTERNAL MEDICINE

## 2022-07-18 PROCEDURE — 86140 C-REACTIVE PROTEIN: CPT | Performed by: INTERNAL MEDICINE

## 2022-07-18 PROCEDURE — 36415 COLL VENOUS BLD VENIPUNCTURE: CPT | Performed by: INTERNAL MEDICINE

## 2022-07-19 ENCOUNTER — TELEPHONE (OUTPATIENT)
Dept: CARDIOLOGY | Facility: CLINIC | Age: 37
End: 2022-07-19
Payer: COMMERCIAL

## 2022-07-20 ENCOUNTER — PATIENT MESSAGE (OUTPATIENT)
Dept: INTERNAL MEDICINE | Facility: CLINIC | Age: 37
End: 2022-07-20
Payer: COMMERCIAL

## 2022-07-21 LAB — LPA SERPL-MCNC: 68 MG/DL (ref 0–30)

## 2022-07-22 ENCOUNTER — OFFICE VISIT (OUTPATIENT)
Dept: CARDIOLOGY | Facility: CLINIC | Age: 37
End: 2022-07-22
Payer: COMMERCIAL

## 2022-07-22 VITALS
BODY MASS INDEX: 23.67 KG/M2 | DIASTOLIC BLOOD PRESSURE: 101 MMHG | SYSTOLIC BLOOD PRESSURE: 142 MMHG | WEIGHT: 159.81 LBS | HEIGHT: 69 IN | HEART RATE: 65 BPM

## 2022-07-22 DIAGNOSIS — Z82.49 FAMILY HISTORY OF CARDIOVASCULAR DISEASE: Primary | ICD-10-CM

## 2022-07-22 DIAGNOSIS — E78.2 MIXED HYPERLIPIDEMIA: ICD-10-CM

## 2022-07-22 DIAGNOSIS — E78.1 HYPERTRIGLYCERIDEMIA: ICD-10-CM

## 2022-07-22 PROCEDURE — 1159F PR MEDICATION LIST DOCUMENTED IN MEDICAL RECORD: ICD-10-PCS | Mod: CPTII,S$GLB,, | Performed by: INTERNAL MEDICINE

## 2022-07-22 PROCEDURE — 99214 OFFICE O/P EST MOD 30 MIN: CPT | Mod: S$GLB,,, | Performed by: INTERNAL MEDICINE

## 2022-07-22 PROCEDURE — 99214 PR OFFICE/OUTPT VISIT, EST, LEVL IV, 30-39 MIN: ICD-10-PCS | Mod: S$GLB,,, | Performed by: INTERNAL MEDICINE

## 2022-07-22 PROCEDURE — 3077F SYST BP >= 140 MM HG: CPT | Mod: CPTII,S$GLB,, | Performed by: INTERNAL MEDICINE

## 2022-07-22 PROCEDURE — 1160F RVW MEDS BY RX/DR IN RCRD: CPT | Mod: CPTII,S$GLB,, | Performed by: INTERNAL MEDICINE

## 2022-07-22 PROCEDURE — 3080F DIAST BP >= 90 MM HG: CPT | Mod: CPTII,S$GLB,, | Performed by: INTERNAL MEDICINE

## 2022-07-22 PROCEDURE — 1159F MED LIST DOCD IN RCRD: CPT | Mod: CPTII,S$GLB,, | Performed by: INTERNAL MEDICINE

## 2022-07-22 PROCEDURE — 3080F PR MOST RECENT DIASTOLIC BLOOD PRESSURE >= 90 MM HG: ICD-10-PCS | Mod: CPTII,S$GLB,, | Performed by: INTERNAL MEDICINE

## 2022-07-22 PROCEDURE — 3008F BODY MASS INDEX DOCD: CPT | Mod: CPTII,S$GLB,, | Performed by: INTERNAL MEDICINE

## 2022-07-22 PROCEDURE — 99999 PR PBB SHADOW E&M-EST. PATIENT-LVL III: ICD-10-PCS | Mod: PBBFAC,,, | Performed by: INTERNAL MEDICINE

## 2022-07-22 PROCEDURE — 1160F PR REVIEW ALL MEDS BY PRESCRIBER/CLIN PHARMACIST DOCUMENTED: ICD-10-PCS | Mod: CPTII,S$GLB,, | Performed by: INTERNAL MEDICINE

## 2022-07-22 PROCEDURE — 3077F PR MOST RECENT SYSTOLIC BLOOD PRESSURE >= 140 MM HG: ICD-10-PCS | Mod: CPTII,S$GLB,, | Performed by: INTERNAL MEDICINE

## 2022-07-22 PROCEDURE — 99999 PR PBB SHADOW E&M-EST. PATIENT-LVL III: CPT | Mod: PBBFAC,,, | Performed by: INTERNAL MEDICINE

## 2022-07-22 PROCEDURE — 3008F PR BODY MASS INDEX (BMI) DOCUMENTED: ICD-10-PCS | Mod: CPTII,S$GLB,, | Performed by: INTERNAL MEDICINE

## 2022-07-22 RX ORDER — AMPICILLIN TRIHYDRATE 250 MG
CAPSULE ORAL
Qty: 120 EACH | Refills: 3 | Status: SHIPPED | OUTPATIENT
Start: 2022-07-22 | End: 2022-11-08

## 2022-07-22 NOTE — PROGRESS NOTES
Subjective:   Patient ID:  Kaden Heller is a 37 y.o. male who presents for follow-up of Other chest pain  (2 month f/u )  Kaden Heller is a 37 y.o. male is a new patient who presents for evaluation of Family history of cardiovascular disease and Transaminitis (Ed f/u 4/18/22)     Referral for at risk for CV disease  Kaden Heller is a 33 y.o. male pt without significant PMHx presents for initial CV evaluation due to strong FH of early CVD.    He had a recent appt with Dr. Granda and discussed significant CV disease. He had recent labwork with elevated cholesterol, TG - 327.   [place] recently. Saw cardiologist in past in [place], had occasional irregular heart beating at night when he rolls over - 7 or 8 years ago.   Had Holter which was negative. Still feels a flutter once or twice. Occ snoring.  Had syncopal episodes last year due to running, improved with hydration.    Patient feels no chest pain, no sob, no leg swelling, no PND, no dizziness,  no CNS symptoms.   Patient has fairly good exercise tolerance. 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    Patient is compliant with medications.   FH - Mother - MI at age 36, had 2, second at 41 - smoker; her mother - MI at 34, father - 52; maternal uncle - 14 MI's   ECG - NSR with sinus arrythmia        Echo 2019   1 - No wall motion abnormalities.     2 - Normal left ventricular systolic function (EF 55-60%).     3 - Normal left ventricular diastolic function.     4 - Normal right ventricular systolic function .     5 - The estimated PA systolic pressure is 25 mmHg.      HPI:  Patient runs occasionally  Recent chest pain for which he was in the ER and likely due to anxiety and was started on anxiolytic and his chest pain has improved  Gets occasional palpitations     ER visit:  37-year-old man with comorbidities of unilateral hearing loss with Meniere's disease, hyperlipidemia, as well as allergies presents to the ED for evaluation of left-sided chest  "discomfort as well as bilateral shoulder pain and subjective weakness of bilateral hands.  The patient also notes feeling anxious today related to work issues, and was noted to exhibit an elevated blood pressure earlier today which has improved without intervention.  At the time of my exam, he describes mild to moderate left-sided chest pain with extremity weakness.  The patient also reports that he was able to complete a 10 K run 2 days ago without significant difficulty.  He denies any associated fevers, chills, nausea, vomiting, or associated diarrhea.  He does report previous episodes of palpitations and chest discomfort in the past the workup for which did not reveal any evidence of pathology.        EKG:  Normal sinus rhythm with sinus arrhythmia  Rightward axis  Nonspecific ST-t wave abnormality  Abnormal ECG  When compared    HPI:   Doing well.   Now on antianxiety meds  No chest pain, Orthopnea, PND of heart failure symptoms.   Denies palpitations or fluttering in the chest    Calcium score  Agatston calcium score equals 0, which translates to the 10th percentile for coronary calcium load based on age and sex.  Additional findings and recommendations above.     Patient Active Problem List   Diagnosis    Family history of cardiovascular disease    Mixed hyperlipidemia    Hypertriglyceridemia    Sensorineural hearing loss (SNHL) of right ear with unrestricted hearing of left ear    Tinnitus of right ear    Meniere's disease of right ear    Migraine equivalent syndrome    Allergic rhinitis    Nasal septal deviation    GUSTABO (generalized anxiety disorder)     BP (!) 142/101 (BP Location: Left arm, Patient Position: Sitting, BP Method: Medium (Automatic))   Pulse 65   Ht 5' 9" (1.753 m)   Wt 72.5 kg (159 lb 13.3 oz)   BMI 23.60 kg/m²   Body mass index is 23.6 kg/m².  CrCl cannot be calculated (Patient's most recent lab result is older than the maximum 7 days allowed.).    Lab Results   Component " Value Date     04/18/2022    K 3.5 04/18/2022    CL 97 04/18/2022    CO2 27 04/18/2022    BUN 10 04/18/2022    CREATININE 0.8 04/18/2022    GLU 94 04/18/2022    AST 63 (H) 04/26/2022     (H) 04/26/2022    ALBUMIN 4.3 04/26/2022    PROT 7.4 04/26/2022    BILITOT 0.6 04/26/2022    WBC 9.09 04/18/2022    HGB 16.7 04/18/2022    HCT 48.2 04/18/2022    MCV 91 04/18/2022     04/18/2022    TSH 1.451 04/18/2022    CHOL 197 04/26/2022    HDL 41 04/26/2022    LDLCALC 127.8 04/26/2022    TRIG 141 04/26/2022       Current Outpatient Medications   Medication Sig    ALPRAZolam (XANAX) 0.25 MG tablet Take 1 tablet (0.25 mg total) by mouth nightly as needed (panic attack).    EScitalopram oxalate (LEXAPRO) 5 MG Tab Take 1 tablet (5 mg total) by mouth once daily.    loratadine (CLARITIN) 5 mg chewable tablet Take 5 mg by mouth once daily.    meclizine (ANTIVERT) 12.5 mg tablet Take 1 tablet (12.5 mg total) by mouth 2 (two) times daily as needed for Dizziness.    mometasone (NASONEX) 50 mcg/actuation nasal spray Use 2 sprays by Nasal route once daily.    triamterene-hydrochlorothiazide 37.5-25 mg (DYAZIDE) 37.5-25 mg per capsule Take 1 capsule by mouth every morning.     No current facility-administered medications for this visit.       Review of Systems   Constitutional: Negative for chills, decreased appetite, malaise/fatigue, night sweats, weight gain and weight loss.   Eyes: Negative for blurred vision, double vision, visual disturbance and visual halos.   Cardiovascular: Negative for chest pain, claudication, cyanosis, dyspnea on exertion, irregular heartbeat, leg swelling, near-syncope, orthopnea, palpitations, paroxysmal nocturnal dyspnea and syncope.   Respiratory: Negative for cough, hemoptysis, snoring, sputum production and wheezing.    Endocrine: Negative for cold intolerance, heat intolerance, polydipsia and polyphagia.   Hematologic/Lymphatic: Negative for adenopathy and bleeding problem. Does  not bruise/bleed easily.   Skin: Negative for flushing, itching, poor wound healing and rash.   Musculoskeletal: Negative for arthritis, back pain, falls, gout, joint pain, joint swelling, muscle cramps, muscle weakness, myalgias, neck pain and stiffness.   Gastrointestinal: Negative for bloating, abdominal pain, anorexia, diarrhea, dysphagia, excessive appetite, flatus, hematemesis, jaundice, melena and nausea.   Genitourinary: Negative for hesitancy and incomplete emptying.   Neurological: Negative for aphonia, brief paralysis, difficulty with concentration, disturbances in coordination, excessive daytime sleepiness, dizziness, focal weakness, light-headedness, loss of balance and weakness.   Psychiatric/Behavioral: Negative for altered mental status, depression, hallucinations, hypervigilance, memory loss, substance abuse and suicidal ideas. The patient is nervous/anxious. The patient does not have insomnia.        Objective:   Physical Exam  Constitutional:       General: He is not in acute distress.     Appearance: He is well-developed. He is not diaphoretic.   HENT:      Head: Normocephalic and atraumatic.      Nose: Nose normal.      Mouth/Throat:      Pharynx: No oropharyngeal exudate.   Eyes:      General: No scleral icterus.        Right eye: No discharge.         Left eye: No discharge.      Conjunctiva/sclera: Conjunctivae normal.      Pupils: Pupils are equal, round, and reactive to light.   Neck:      Thyroid: No thyromegaly.      Vascular: No JVD.      Trachea: No tracheal deviation.   Cardiovascular:      Rate and Rhythm: Normal rate and regular rhythm.      Pulses: Intact distal pulses.      Heart sounds: Normal heart sounds. No murmur heard.    No friction rub. No gallop.   Pulmonary:      Effort: Pulmonary effort is normal. No respiratory distress.      Breath sounds: Normal breath sounds. No stridor. No wheezing or rales.   Chest:      Chest wall: No tenderness.   Abdominal:      General: Bowel  sounds are normal. There is no distension.      Palpations: Abdomen is soft. There is no mass.      Tenderness: There is no abdominal tenderness.   Musculoskeletal:         General: No tenderness.      Cervical back: Normal range of motion and neck supple.   Lymphadenopathy:      Cervical: No cervical adenopathy.   Skin:     General: Skin is warm.      Coloration: Skin is not pale.      Findings: No erythema or rash.   Neurological:      Mental Status: He is alert and oriented to person, place, and time.      Cranial Nerves: No cranial nerve deficit.      Motor: No abnormal muscle tone.      Coordination: Coordination normal.      Deep Tendon Reflexes: Reflexes normal.   Psychiatric:         Behavior: Behavior normal.         Thought Content: Thought content normal.         Judgment: Judgment normal.         Assessment:     1. Family history of cardiovascular disease    2. Hypertriglyceridemia    3. Mixed hyperlipidemia        Plan:   Kaden was seen today for other chest pain .    Diagnoses and all orders for this visit:    Family history of cardiovascular disease  -     Lipid Panel; Future    Hypertriglyceridemia  -     Lipid Panel; Future    Mixed hyperlipidemia  -     Lipid Panel; Future    Other orders  -     red yeast rice 600 mg Cap; Take 2 caps twice daily    RTC 1.5 years. Given LP(A), low thershold for statin initiation.   Counseled on importance of heart healthy diet low in saturated and trans fat and salt as well gradually starting a regular aerobic exercise regimen with goal of 30min 5x/week. Recommend BP diary. Call if systolic BP > 130 mmHg on checking repeatedly

## 2022-09-20 ENCOUNTER — PATIENT MESSAGE (OUTPATIENT)
Dept: INTERNAL MEDICINE | Facility: CLINIC | Age: 37
End: 2022-09-20
Payer: COMMERCIAL

## 2022-09-20 DIAGNOSIS — F41.1 GAD (GENERALIZED ANXIETY DISORDER): ICD-10-CM

## 2022-09-20 RX ORDER — ESCITALOPRAM OXALATE 10 MG/1
10 TABLET ORAL DAILY
Qty: 30 TABLET | Refills: 11 | Status: SHIPPED | OUTPATIENT
Start: 2022-09-20 | End: 2023-11-16

## 2022-10-24 ENCOUNTER — LAB VISIT (OUTPATIENT)
Dept: LAB | Facility: HOSPITAL | Age: 37
End: 2022-10-24
Attending: INTERNAL MEDICINE
Payer: COMMERCIAL

## 2022-10-24 DIAGNOSIS — E78.2 MIXED HYPERLIPIDEMIA: ICD-10-CM

## 2022-10-24 DIAGNOSIS — Z82.49 FAMILY HISTORY OF CARDIOVASCULAR DISEASE: ICD-10-CM

## 2022-10-24 DIAGNOSIS — E78.1 HYPERTRIGLYCERIDEMIA: ICD-10-CM

## 2022-10-24 LAB
CHOLEST SERPL-MCNC: 274 MG/DL (ref 120–199)
CHOLEST/HDLC SERPL: 6.9 {RATIO} (ref 2–5)
HDLC SERPL-MCNC: 40 MG/DL (ref 40–75)
HDLC SERPL: 14.6 % (ref 20–50)
LDLC SERPL CALC-MCNC: 175.6 MG/DL (ref 63–159)
NONHDLC SERPL-MCNC: 234 MG/DL
TRIGL SERPL-MCNC: 292 MG/DL (ref 30–150)

## 2022-10-24 PROCEDURE — 36415 COLL VENOUS BLD VENIPUNCTURE: CPT | Performed by: INTERNAL MEDICINE

## 2022-10-24 PROCEDURE — 80061 LIPID PANEL: CPT | Performed by: INTERNAL MEDICINE

## 2022-11-08 ENCOUNTER — OFFICE VISIT (OUTPATIENT)
Dept: OTOLARYNGOLOGY | Facility: CLINIC | Age: 37
End: 2022-11-08
Payer: COMMERCIAL

## 2022-11-08 VITALS
DIASTOLIC BLOOD PRESSURE: 88 MMHG | SYSTOLIC BLOOD PRESSURE: 134 MMHG | WEIGHT: 164.25 LBS | TEMPERATURE: 98 F | BODY MASS INDEX: 24.25 KG/M2 | HEART RATE: 76 BPM

## 2022-11-08 DIAGNOSIS — H69.93 DYSFUNCTION OF BOTH EUSTACHIAN TUBES: ICD-10-CM

## 2022-11-08 DIAGNOSIS — H93.11 TINNITUS OF RIGHT EAR: ICD-10-CM

## 2022-11-08 DIAGNOSIS — H93.8X2 PRESSURE SENSATION IN LEFT EAR: ICD-10-CM

## 2022-11-08 DIAGNOSIS — H81.01 MENIERE'S DISEASE OF RIGHT EAR: Primary | ICD-10-CM

## 2022-11-08 DIAGNOSIS — H90.41 SENSORINEURAL HEARING LOSS (SNHL) OF RIGHT EAR WITH UNRESTRICTED HEARING OF LEFT EAR: ICD-10-CM

## 2022-11-08 DIAGNOSIS — J30.9 ALLERGIC RHINITIS, UNSPECIFIED SEASONALITY, UNSPECIFIED TRIGGER: ICD-10-CM

## 2022-11-08 PROCEDURE — 3079F PR MOST RECENT DIASTOLIC BLOOD PRESSURE 80-89 MM HG: ICD-10-PCS | Mod: CPTII,S$GLB,, | Performed by: SPECIALIST

## 2022-11-08 PROCEDURE — 99999 PR PBB SHADOW E&M-EST. PATIENT-LVL III: ICD-10-PCS | Mod: PBBFAC,,, | Performed by: SPECIALIST

## 2022-11-08 PROCEDURE — 1160F PR REVIEW ALL MEDS BY PRESCRIBER/CLIN PHARMACIST DOCUMENTED: ICD-10-PCS | Mod: CPTII,S$GLB,, | Performed by: SPECIALIST

## 2022-11-08 PROCEDURE — 3075F PR MOST RECENT SYSTOLIC BLOOD PRESS GE 130-139MM HG: ICD-10-PCS | Mod: CPTII,S$GLB,, | Performed by: SPECIALIST

## 2022-11-08 PROCEDURE — 99999 PR PBB SHADOW E&M-EST. PATIENT-LVL III: CPT | Mod: PBBFAC,,, | Performed by: SPECIALIST

## 2022-11-08 PROCEDURE — 99213 OFFICE O/P EST LOW 20 MIN: CPT | Mod: S$GLB,,, | Performed by: SPECIALIST

## 2022-11-08 PROCEDURE — 99213 PR OFFICE/OUTPT VISIT, EST, LEVL III, 20-29 MIN: ICD-10-PCS | Mod: S$GLB,,, | Performed by: SPECIALIST

## 2022-11-08 PROCEDURE — 1159F MED LIST DOCD IN RCRD: CPT | Mod: CPTII,S$GLB,, | Performed by: SPECIALIST

## 2022-11-08 PROCEDURE — 3008F BODY MASS INDEX DOCD: CPT | Mod: CPTII,S$GLB,, | Performed by: SPECIALIST

## 2022-11-08 PROCEDURE — 3075F SYST BP GE 130 - 139MM HG: CPT | Mod: CPTII,S$GLB,, | Performed by: SPECIALIST

## 2022-11-08 PROCEDURE — 3079F DIAST BP 80-89 MM HG: CPT | Mod: CPTII,S$GLB,, | Performed by: SPECIALIST

## 2022-11-08 PROCEDURE — 3008F PR BODY MASS INDEX (BMI) DOCUMENTED: ICD-10-PCS | Mod: CPTII,S$GLB,, | Performed by: SPECIALIST

## 2022-11-08 PROCEDURE — 1159F PR MEDICATION LIST DOCUMENTED IN MEDICAL RECORD: ICD-10-PCS | Mod: CPTII,S$GLB,, | Performed by: SPECIALIST

## 2022-11-08 PROCEDURE — 1160F RVW MEDS BY RX/DR IN RCRD: CPT | Mod: CPTII,S$GLB,, | Performed by: SPECIALIST

## 2022-11-08 RX ORDER — MONTELUKAST SODIUM 10 MG/1
TABLET ORAL
Qty: 30 TABLET | Refills: 11 | Status: SHIPPED | OUTPATIENT
Start: 2022-11-08 | End: 2024-01-23 | Stop reason: SDUPTHER

## 2022-11-08 RX ORDER — PREDNISONE 10 MG/1
TABLET ORAL
Qty: 18 TABLET | Refills: 0 | Status: SHIPPED | OUTPATIENT
Start: 2022-11-08 | End: 2023-11-08

## 2022-11-08 NOTE — PROGRESS NOTES
Subjective:       Patient ID: Kaden Heller is a 37 y.o. male.    Chief Complaint: Follow-up (With right ear ringing and left ear ringing)    The patient is returning for follow-up visit.  It has been 4 months since I last saw him.  There are multiple issues to discuss:  1. Meniere's disease:   The patient continues to have is, pressure and fullness in his right ear.  He is taking Dyazide once daily and maintaining low-sodium intake and restriction caffeine and nicotine.  2. Tinnitus:  His tinnitus continues.     3. Chest pain:  He is not had further issues with chest pain   4. Allergies:   In the last few weeks he has noticed pressure in his left ear, nasal congestion and blockage in postnasal drip.  Both ears are experiencing pressure but the right side more than the left.  He is not had any episodes of vertigo he does experience a brain fog at least 3 of 7 days.  Onset has been after we have had recent weather fronts passed through the area.        Review of Systems   Constitutional:  Positive for appetite change and fatigue. Negative for activity change, chills, fever and unexpected weight change.   HENT:  Positive for nasal congestion, ear pain (, acute otitis media/ear infections), hearing loss, postnasal drip, sinus pressure/congestion and tinnitus. Negative for ear discharge, facial swelling, mouth sores, rhinorrhea, sneezing, sore throat, trouble swallowing and voice change.    Eyes: Negative.  Negative for photophobia, pain, discharge, redness, itching and visual disturbance.        Floaters   Respiratory:  Positive for wheezing. Negative for apnea, cough, choking and shortness of breath.         Snoring   Cardiovascular:  Positive for chest pain. Negative for palpitations.   Gastrointestinal:  Positive for diarrhea. Negative for abdominal distention, abdominal pain, nausea and vomiting.   Endocrine: Negative.    Genitourinary: Negative.    Musculoskeletal:  Positive for myalgias. Negative for arthralgias,  neck pain and neck stiffness.   Integumentary:  Negative for color change, pallor and rash. Negative.   Allergic/Immunologic: Positive for environmental allergies (Seasonal allergies). Negative for food allergies.   Neurological:  Positive for dizziness, weakness, light-headedness and headaches. Negative for facial asymmetry, speech difficulty and numbness.   Hematological: Negative.  Negative for adenopathy. Does not bruise/bleed easily.   Psychiatric/Behavioral:  Positive for sleep disturbance. Negative for agitation, confusion and decreased concentration. The patient is nervous/anxious.        Objective:      Physical Exam  Vitals and nursing note reviewed.   Constitutional:       General: He is awake.      Appearance: Normal appearance. He is well-developed, well-groomed and normal weight.   HENT:      Head: Normocephalic.      Jaw: There is normal jaw occlusion.      Salivary Glands: Right salivary gland is not diffusely enlarged. Left salivary gland is not diffusely enlarged.      Right Ear: Hearing, ear canal and external ear normal. Tympanic membrane is retracted. Tympanic membrane has decreased mobility (Non mobile on pneumatic otoscopy).      Left Ear: Hearing, ear canal and external ear normal. Tympanic membrane is retracted. Tympanic membrane has decreased mobility ( decreased mobility on pneumatic otoscopy).      Nose: Septal deviation ( to the right), mucosal edema (cyanotic, boggy inferior turbinates bilaterally) and rhinorrhea (clear mucus bilaterally) present. No nasal deformity. Rhinorrhea is clear.      Right Turbinates: Enlarged and pale.      Left Turbinates: Enlarged and pale.      Mouth/Throat:      Lips: No lesions.      Mouth: No oral lesions.      Dentition: No gum lesions.      Tongue: No lesions.      Palate: No mass and lesions.      Pharynx: Oropharynx is clear. Uvula midline.      Tonsils: No tonsillar exudate. 2+ on the right. 2+ on the left.   Eyes:      General: Lids are normal.          Right eye: No discharge.         Left eye: No discharge.      Conjunctiva/sclera:      Right eye: Right conjunctiva is not injected. No exudate.     Left eye: Left conjunctiva is not injected. No exudate.     Pupils: Pupils are equal, round, and reactive to light.   Neck:      Thyroid: No thyroid mass or thyromegaly.      Vascular: No carotid bruit.      Trachea: Trachea normal. No tracheal deviation.   Cardiovascular:      Rate and Rhythm: Normal rate and regular rhythm.      Pulses: Normal pulses.      Heart sounds: Normal heart sounds.   Pulmonary:      Effort: Pulmonary effort is normal.      Breath sounds: Normal breath sounds. No stridor. No decreased breath sounds, wheezing, rhonchi or rales.   Abdominal:      General: Bowel sounds are normal.      Palpations: Abdomen is soft.      Tenderness: There is no abdominal tenderness.   Musculoskeletal:         General: Normal range of motion.      Cervical back: Normal range of motion and neck supple. No muscular tenderness.   Lymphadenopathy:      Head:      Right side of head: No submental, submandibular, preauricular, posterior auricular or occipital adenopathy.      Left side of head: No submental, submandibular, preauricular, posterior auricular or occipital adenopathy.      Cervical: No cervical adenopathy.   Skin:     General: Skin is warm and dry.      Findings: No petechiae or rash.      Nails: There is no clubbing.   Neurological:      Mental Status: He is alert and oriented to person, place, and time.      Cranial Nerves: No cranial nerve deficit.      Sensory: No sensory deficit.      Gait: Gait normal.      Comments: Neuro otologic-no nystagmus, normal gait, Romberg negative, tandem Romberg for unsteady   Psychiatric:         Mood and Affect: Mood and affect normal.         Speech: Speech normal.         Behavior: Behavior normal. Behavior is cooperative.         Thought Content: Thought content normal.         Judgment: Judgment normal.          Assessment:       Problem List Items Addressed This Visit          ENT    Sensorineural hearing loss (SNHL) of right ear with unrestricted hearing of left ear    Relevant Orders    Ambulatory referral/consult to Audiology    Tinnitus of right ear    Meniere's disease of right ear - Primary    Relevant Orders    Ambulatory referral/consult to Audiology    Allergic rhinitis     Other Visit Diagnoses       Pressure sensation in left ear        Relevant Orders    Ambulatory referral/consult to Audiology    Dysfunction of both eustachian tubes                Plan:       I  am placing the patient on an oral  prednisone taper.  He will continue with the daily loratadine, mometasone nasal spray and montelukast at night and the daily triamterene-hydrochlorothiazide.  If he is having pressure persist in the left ear I will recheck him in 10 days.  Otherwise I will recheck him in April.  He will undergo a comprehensive auditory evaluation prior to the visit in April.  I will move that up if he is having persistence of symptoms.

## 2022-11-09 ENCOUNTER — PATIENT MESSAGE (OUTPATIENT)
Dept: CARDIOLOGY | Facility: CLINIC | Age: 37
End: 2022-11-09
Payer: COMMERCIAL

## 2022-11-09 DIAGNOSIS — E78.2 MIXED HYPERLIPIDEMIA: Primary | ICD-10-CM

## 2023-01-31 ENCOUNTER — OCCUPATIONAL HEALTH (OUTPATIENT)
Dept: URGENT CARE | Facility: CLINIC | Age: 38
End: 2023-01-31

## 2023-01-31 DIAGNOSIS — Z11.59 ENCOUNTER FOR SCREENING FOR OTHER VIRAL DISEASES: Primary | ICD-10-CM

## 2023-01-31 LAB
CTP QC/QA: YES
SARS-COV-2 AG RESP QL IA.RAPID: NEGATIVE

## 2023-01-31 PROCEDURE — 87811 SARS CORONAVIRUS 2 ANTIGEN POCT, MANUAL READ: ICD-10-PCS | Mod: QW,S$GLB,, | Performed by: NURSE PRACTITIONER

## 2023-01-31 PROCEDURE — 87811 SARS-COV-2 COVID19 W/OPTIC: CPT | Mod: QW,S$GLB,, | Performed by: NURSE PRACTITIONER

## 2023-02-02 ENCOUNTER — LAB VISIT (OUTPATIENT)
Dept: LAB | Facility: HOSPITAL | Age: 38
End: 2023-02-02
Attending: INTERNAL MEDICINE
Payer: COMMERCIAL

## 2023-02-02 DIAGNOSIS — E78.2 MIXED HYPERLIPIDEMIA: ICD-10-CM

## 2023-02-02 DIAGNOSIS — E78.00 HYPERCHOLESTEREMIA: ICD-10-CM

## 2023-02-02 LAB
ALBUMIN SERPL BCP-MCNC: 4.5 G/DL (ref 3.5–5.2)
ALP SERPL-CCNC: 75 U/L (ref 55–135)
ALT SERPL W/O P-5'-P-CCNC: 235 U/L (ref 10–44)
AST SERPL-CCNC: 143 U/L (ref 10–40)
BILIRUB DIRECT SERPL-MCNC: 0.3 MG/DL (ref 0.1–0.3)
BILIRUB SERPL-MCNC: 0.8 MG/DL (ref 0.1–1)
CHOLEST SERPL-MCNC: 167 MG/DL (ref 120–199)
CHOLEST/HDLC SERPL: 4.5 {RATIO} (ref 2–5)
HDLC SERPL-MCNC: 37 MG/DL (ref 40–75)
HDLC SERPL: 22.2 % (ref 20–50)
LDLC SERPL CALC-MCNC: 100 MG/DL (ref 63–159)
NONHDLC SERPL-MCNC: 130 MG/DL
PROT SERPL-MCNC: 7.9 G/DL (ref 6–8.4)
TRIGL SERPL-MCNC: 150 MG/DL (ref 30–150)

## 2023-02-02 PROCEDURE — 36415 COLL VENOUS BLD VENIPUNCTURE: CPT | Performed by: INTERNAL MEDICINE

## 2023-02-02 PROCEDURE — 80061 LIPID PANEL: CPT | Performed by: INTERNAL MEDICINE

## 2023-02-02 PROCEDURE — 80076 HEPATIC FUNCTION PANEL: CPT | Performed by: INTERNAL MEDICINE

## 2023-02-20 ENCOUNTER — PATIENT MESSAGE (OUTPATIENT)
Dept: CARDIOLOGY | Facility: CLINIC | Age: 38
End: 2023-02-20
Payer: COMMERCIAL

## 2023-02-20 NOTE — PROGRESS NOTES
As Dr. Wells requested me to do is to inform Mr. Heller about his test results. Tried to reach him by phone but did not get a response. I then messaged him stating that his Cholesterol levels are much improved continue medications as prescribed. I am now waiting for his response.

## 2023-03-08 ENCOUNTER — CLINICAL SUPPORT (OUTPATIENT)
Dept: OTOLARYNGOLOGY | Facility: CLINIC | Age: 38
End: 2023-03-08
Payer: COMMERCIAL

## 2023-03-08 ENCOUNTER — PATIENT MESSAGE (OUTPATIENT)
Dept: OTOLARYNGOLOGY | Facility: CLINIC | Age: 38
End: 2023-03-08
Payer: COMMERCIAL

## 2023-03-08 DIAGNOSIS — H93.8X2 PRESSURE SENSATION IN LEFT EAR: ICD-10-CM

## 2023-03-08 DIAGNOSIS — H81.01 MENIERE'S DISEASE OF RIGHT EAR: ICD-10-CM

## 2023-03-08 DIAGNOSIS — H90.41 SENSORINEURAL HEARING LOSS (SNHL) OF RIGHT EAR WITH UNRESTRICTED HEARING OF LEFT EAR: ICD-10-CM

## 2023-03-08 PROCEDURE — 92567 TYMPANOMETRY: CPT | Mod: S$GLB,,, | Performed by: AUDIOLOGIST

## 2023-03-08 PROCEDURE — 92557 PR COMPREHENSIVE HEARING TEST: ICD-10-PCS | Mod: S$GLB,,, | Performed by: AUDIOLOGIST

## 2023-03-08 PROCEDURE — 92557 COMPREHENSIVE HEARING TEST: CPT | Mod: S$GLB,,, | Performed by: AUDIOLOGIST

## 2023-03-08 PROCEDURE — 92567 PR TYMPA2METRY: ICD-10-PCS | Mod: S$GLB,,, | Performed by: AUDIOLOGIST

## 2023-03-08 NOTE — Clinical Note
Lorne Saravia!  Please see the results of Mr. Heller's audiogram from today. If you have any questions, please let me know. He will need to make an appt with you for April.  Thank you! Niyah

## 2023-03-08 NOTE — PROGRESS NOTES
Mr. Kaden Heller was seen in the clinic today for an audiological evaluation.  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  Kaden reported that he feels aural pressure and tinnitus are now bilateral (right worse than left).    Audiological testing revealed a moderate rising to mild sloping to moderate sensorineural hearing loss for the right ear and normal hearing sensitivity for the left ear.  A speech reception threshold was obtained at 40 dBHL for the right ear and at 10 dBHL for the left ear.  Speech discrimination was 96% for the right ear and 100% for the left ear.      Tympanometry testing revealed a Type A tympanogram for the right ear and a Type A tympanogram for the left ear.      Recommendations:  1. Otologic evaluation  2. Annual audiological evaluation (may consider every 6 months due to reported symptoms of left ear now)   3. Hearing protection when in noise   4. Hearing aid consultation of the right ear

## 2023-08-03 ENCOUNTER — PATIENT MESSAGE (OUTPATIENT)
Dept: INTERNAL MEDICINE | Facility: CLINIC | Age: 38
End: 2023-08-03
Payer: COMMERCIAL

## 2023-08-30 ENCOUNTER — HOSPITAL ENCOUNTER (EMERGENCY)
Facility: HOSPITAL | Age: 38
Discharge: HOME | End: 2023-08-30
Attending: EMERGENCY MEDICINE
Payer: COMMERCIAL

## 2023-08-30 VITALS
SYSTOLIC BLOOD PRESSURE: 138 MMHG | RESPIRATION RATE: 18 BRPM | BODY MASS INDEX: 24.73 KG/M2 | HEART RATE: 75 BPM | HEIGHT: 69 IN | WEIGHT: 167 LBS | DIASTOLIC BLOOD PRESSURE: 88 MMHG | TEMPERATURE: 98 F | OXYGEN SATURATION: 97 %

## 2023-08-30 DIAGNOSIS — R11.2 NAUSEA, VOMITING, AND DIARRHEA: ICD-10-CM

## 2023-08-30 DIAGNOSIS — R19.7 NAUSEA, VOMITING, AND DIARRHEA: ICD-10-CM

## 2023-08-30 DIAGNOSIS — R10.10 UPPER ABDOMINAL PAIN: Primary | ICD-10-CM

## 2023-08-30 LAB
ALBUMIN SERPL BCP-MCNC: 4.3 G/DL (ref 3.5–5.2)
ALP SERPL-CCNC: 59 U/L (ref 55–135)
ALT SERPL W/O P-5'-P-CCNC: 138 U/L (ref 10–44)
ANION GAP SERPL CALC-SCNC: 10 MMOL/L (ref 8–16)
AST SERPL-CCNC: 80 U/L (ref 10–40)
BASOPHILS # BLD AUTO: 0.03 K/UL (ref 0–0.2)
BASOPHILS NFR BLD: 0.4 % (ref 0–1.9)
BILIRUB SERPL-MCNC: 0.8 MG/DL (ref 0.1–1)
BILIRUB UR QL STRIP: NEGATIVE
BUN SERPL-MCNC: 7 MG/DL (ref 6–20)
BUN SERPL-MCNC: 8 MG/DL (ref 6–30)
CALCIUM SERPL-MCNC: 9.3 MG/DL (ref 8.7–10.5)
CHLORIDE SERPL-SCNC: 103 MMOL/L (ref 95–110)
CHLORIDE SERPL-SCNC: 98 MMOL/L (ref 95–110)
CLARITY UR REFRACT.AUTO: CLEAR
CO2 SERPL-SCNC: 26 MMOL/L (ref 23–29)
COLOR UR AUTO: YELLOW
CREAT SERPL-MCNC: 0.8 MG/DL (ref 0.5–1.4)
CREAT SERPL-MCNC: 0.9 MG/DL (ref 0.5–1.4)
DIFFERENTIAL METHOD: NORMAL
EOSINOPHIL # BLD AUTO: 0 K/UL (ref 0–0.5)
EOSINOPHIL NFR BLD: 0.5 % (ref 0–8)
ERYTHROCYTE [DISTWIDTH] IN BLOOD BY AUTOMATED COUNT: 11.9 % (ref 11.5–14.5)
EST. GFR  (NO RACE VARIABLE): >60 ML/MIN/1.73 M^2
GLUCOSE SERPL-MCNC: 103 MG/DL (ref 70–110)
GLUCOSE SERPL-MCNC: 106 MG/DL (ref 70–110)
GLUCOSE UR QL STRIP: NEGATIVE
HAV IGM SERPL QL IA: NORMAL
HBV CORE IGM SERPL QL IA: NORMAL
HBV SURFACE AG SERPL QL IA: NORMAL
HCT VFR BLD AUTO: 45.7 % (ref 40–54)
HCT VFR BLD CALC: 46 %PCV (ref 36–54)
HCV AB SERPL QL IA: NORMAL
HCV AB SERPL QL IA: NORMAL
HGB BLD-MCNC: 15.3 G/DL (ref 14–18)
HGB UR QL STRIP: NEGATIVE
HIV 1+2 AB+HIV1 P24 AG SERPL QL IA: NORMAL
IMM GRANULOCYTES # BLD AUTO: 0.02 K/UL (ref 0–0.04)
IMM GRANULOCYTES NFR BLD AUTO: 0.3 % (ref 0–0.5)
KETONES UR QL STRIP: NEGATIVE
LEUKOCYTE ESTERASE UR QL STRIP: NEGATIVE
LIPASE SERPL-CCNC: 14 U/L (ref 4–60)
LYMPHOCYTES # BLD AUTO: 2.3 K/UL (ref 1–4.8)
LYMPHOCYTES NFR BLD: 31.4 % (ref 18–48)
MCH RBC QN AUTO: 30.4 PG (ref 27–31)
MCHC RBC AUTO-ENTMCNC: 33.5 G/DL (ref 32–36)
MCV RBC AUTO: 91 FL (ref 82–98)
MONOCYTES # BLD AUTO: 0.7 K/UL (ref 0.3–1)
MONOCYTES NFR BLD: 8.7 % (ref 4–15)
NEUTROPHILS # BLD AUTO: 4.4 K/UL (ref 1.8–7.7)
NEUTROPHILS NFR BLD: 58.7 % (ref 38–73)
NITRITE UR QL STRIP: NEGATIVE
NRBC BLD-RTO: 0 /100 WBC
PH UR STRIP: 7 [PH] (ref 5–8)
PLATELET # BLD AUTO: 202 K/UL (ref 150–450)
PMV BLD AUTO: 11 FL (ref 9.2–12.9)
POC IONIZED CALCIUM: 1.11 MMOL/L (ref 1.06–1.42)
POC TCO2 (MEASURED): 29 MMOL/L (ref 23–29)
POTASSIUM BLD-SCNC: 3.9 MMOL/L (ref 3.5–5.1)
POTASSIUM SERPL-SCNC: 3.5 MMOL/L (ref 3.5–5.1)
PROT SERPL-MCNC: 7.5 G/DL (ref 6–8.4)
PROT UR QL STRIP: NEGATIVE
RBC # BLD AUTO: 5.04 M/UL (ref 4.6–6.2)
SAMPLE: NORMAL
SODIUM BLD-SCNC: 139 MMOL/L (ref 136–145)
SODIUM SERPL-SCNC: 139 MMOL/L (ref 136–145)
SP GR UR STRIP: 1.02 (ref 1–1.03)
URN SPEC COLLECT METH UR: NORMAL
WBC # BLD AUTO: 7.46 K/UL (ref 3.9–12.7)

## 2023-08-30 PROCEDURE — 80053 COMPREHEN METABOLIC PANEL: CPT | Performed by: EMERGENCY MEDICINE

## 2023-08-30 PROCEDURE — 81003 URINALYSIS AUTO W/O SCOPE: CPT | Performed by: EMERGENCY MEDICINE

## 2023-08-30 PROCEDURE — 85025 COMPLETE CBC W/AUTO DIFF WBC: CPT | Performed by: EMERGENCY MEDICINE

## 2023-08-30 PROCEDURE — 25000003 PHARM REV CODE 250: Performed by: EMERGENCY MEDICINE

## 2023-08-30 PROCEDURE — 96375 TX/PRO/DX INJ NEW DRUG ADDON: CPT

## 2023-08-30 PROCEDURE — 80047 BASIC METABLC PNL IONIZED CA: CPT

## 2023-08-30 PROCEDURE — 86803 HEPATITIS C AB TEST: CPT | Performed by: PHYSICIAN ASSISTANT

## 2023-08-30 PROCEDURE — 96361 HYDRATE IV INFUSION ADD-ON: CPT

## 2023-08-30 PROCEDURE — 25500020 PHARM REV CODE 255: Performed by: EMERGENCY MEDICINE

## 2023-08-30 PROCEDURE — 83690 ASSAY OF LIPASE: CPT | Performed by: EMERGENCY MEDICINE

## 2023-08-30 PROCEDURE — 93010 EKG 12-LEAD: ICD-10-PCS | Mod: ,,, | Performed by: INTERNAL MEDICINE

## 2023-08-30 PROCEDURE — 93005 ELECTROCARDIOGRAM TRACING: CPT

## 2023-08-30 PROCEDURE — 87389 HIV-1 AG W/HIV-1&-2 AB AG IA: CPT | Performed by: PHYSICIAN ASSISTANT

## 2023-08-30 PROCEDURE — 99285 EMERGENCY DEPT VISIT HI MDM: CPT | Mod: 25

## 2023-08-30 PROCEDURE — 93010 ELECTROCARDIOGRAM REPORT: CPT | Mod: ,,, | Performed by: INTERNAL MEDICINE

## 2023-08-30 PROCEDURE — 80074 ACUTE HEPATITIS PANEL: CPT | Performed by: EMERGENCY MEDICINE

## 2023-08-30 PROCEDURE — 96374 THER/PROPH/DIAG INJ IV PUSH: CPT

## 2023-08-30 PROCEDURE — 63600175 PHARM REV CODE 636 W HCPCS: Performed by: EMERGENCY MEDICINE

## 2023-08-30 RX ORDER — ONDANSETRON 2 MG/ML
4 INJECTION INTRAMUSCULAR; INTRAVENOUS
Status: COMPLETED | OUTPATIENT
Start: 2023-08-30 | End: 2023-08-30

## 2023-08-30 RX ORDER — PROMETHAZINE HYDROCHLORIDE 25 MG/1
25 TABLET ORAL EVERY 6 HOURS PRN
Qty: 20 TABLET | Refills: 0 | Status: SHIPPED | OUTPATIENT
Start: 2023-08-30 | End: 2023-11-21

## 2023-08-30 RX ORDER — ONDANSETRON 4 MG/1
4 TABLET, ORALLY DISINTEGRATING ORAL EVERY 6 HOURS PRN
Qty: 10 TABLET | Refills: 0 | Status: SHIPPED | OUTPATIENT
Start: 2023-08-30 | End: 2023-11-21

## 2023-08-30 RX ORDER — MORPHINE SULFATE 2 MG/ML
6 INJECTION, SOLUTION INTRAMUSCULAR; INTRAVENOUS
Status: COMPLETED | OUTPATIENT
Start: 2023-08-30 | End: 2023-08-30

## 2023-08-30 RX ADMIN — MORPHINE SULFATE 6 MG: 2 INJECTION, SOLUTION INTRAMUSCULAR; INTRAVENOUS at 02:08

## 2023-08-30 RX ADMIN — IOHEXOL 100 ML: 350 INJECTION, SOLUTION INTRAVENOUS at 03:08

## 2023-08-30 RX ADMIN — SODIUM CHLORIDE 1000 ML: 9 INJECTION, SOLUTION INTRAVENOUS at 03:08

## 2023-08-30 RX ADMIN — ONDANSETRON 4 MG: 2 INJECTION INTRAMUSCULAR; INTRAVENOUS at 02:08

## 2023-08-30 NOTE — ED NOTES
Patient identifiers verified and correct for  Mr Heller  C/C: ABD pain, vomiting SEE NN  APPEARANCE: awake and alert in NAD. PAIN  *8/10  SKIN: warm, dry and intact. No breakdown or bruising.  MUSCULOSKELETAL: Patient moving all extremities spontaneously, no obvious swelling or deformities noted. Ambulates independently.  RESPIRATORY: Denies shortness of breath.Respirations unlabored.   CARDIAC: Denies CP, 2+ distal pulses; no peripheral edema  ABDOMEN: ADBomen mid upper epigastric pain , reports emesis x6  : voids spontaneously, denies difficulty  Neurologic: AAO x 4; follows commands equal strength in all extremities; denies numbness/tingling. Denies dizziness Denies new weakness

## 2023-08-30 NOTE — ED NOTES
Patient sattes mid upper epigastric pain onset 0600, reports pain  intermittent, reports emesis x 6 today, phenergan today

## 2023-08-30 NOTE — ED PROVIDER NOTES
Encounter Date: 8/30/2023       History     Chief Complaint   Patient presents with    Abdominal Pain     Nvd since [place] [employer] on [place]     This history was obtained from the patient without limitations.  He is a 38-year-old with the below past medical history who presents by personal transportation.  He complains upper abdominal pain, nausea, vomiting, and diarrhea that began 2 days ago.  He attributes the onset to possibly eating bad jambalaya at the [employer] three days ago.  The symptoms have waxing and waning intensity and are marginally improved with Zofran and promethazine.        Review of patient's allergies indicates:   Allergen Reactions    Hazelnut Itching, Other (See Comments) and Swelling    Micheal Itching, Other (See Comments) and Swelling     Past Medical History:   Diagnosis Date    Food allergy 2015    micheal and hazelnut    GUSTABO (generalized anxiety disorder) 4/20/2022    Hearing loss 11/27/2020    same time tinnitus started 24/7    Meniere disease 12/2019    Mixed hyperlipidemia 02/18/2019    Seasonal allergies      Past Surgical History:   Procedure Laterality Date    APPENDECTOMY      HIP ARTHROSCOPY W/ LABRAL REPAIR      HIP SURGERY  3/3/2006    arthriscopic     Family History   Problem Relation Age of Onset    Heart disease Mother     Heart attack Mother     Hypertension Mother         horrible heart disease on mother's side    Asthma Father     Anemia Sister     No Known Problems Daughter     No Known Problems Son     Heart attack Maternal Uncle     Heart disease Maternal Uncle     Heart attack Maternal Grandmother     Heart disease Maternal Grandmother     Heart attack Maternal Grandfather     Heart disease Maternal Grandfather      Social History     Tobacco Use    Smoking status: Never    Smokeless tobacco: Never   Substance Use Topics    Alcohol use: Yes     Alcohol/week: 3.0 standard drinks of alcohol     Types: 2 Cans of beer, 1 Shots of liquor per week     Comment: occas    Drug  use: Never     Review of Systems  See HPI.      Physical Exam     Initial Vitals [08/30/23 1318]   BP Pulse Resp Temp SpO2   (!) 158/93 98 18 99.1 °F (37.3 °C) 98 %      MAP       --         Physical Exam    Nursing note and vitals reviewed.  Constitutional: He is not diaphoretic. No distress.   Eyes: Conjunctivae are normal. No scleral icterus.   Cardiovascular:  Normal rate, regular rhythm and normal heart sounds.     Exam reveals no gallop and no friction rub.       No murmur heard.  Pulmonary/Chest: No respiratory distress. He has no wheezes. He has no rhonchi. He has no rales.   Abdominal: Abdomen is soft. Bowel sounds are normal. He exhibits no distension. There is generalized abdominal tenderness. There is guarding.     Neurological: He is alert and oriented to person, place, and time. GCS eye subscore is 4. GCS verbal subscore is 5. GCS motor subscore is 6.   Skin: Skin is warm and dry. No pallor.   Psychiatric: He is not actively hallucinating. He is attentive.         ED Course   Procedures  Labs Reviewed   COMPREHENSIVE METABOLIC PANEL - Abnormal; Notable for the following components:       Result Value    AST 80 (*)      (*)     All other components within normal limits   HIV 1 / 2 ANTIBODY    Narrative:     Release to patient->Immediate   HEPATITIS C ANTIBODY    Narrative:     Release to patient->Immediate   CBC W/ AUTO DIFFERENTIAL   LIPASE   URINALYSIS, REFLEX TO URINE CULTURE    Narrative:     Specimen Source->Urine   HEPATITIS PANEL, ACUTE   ISTAT PROCEDURE            Imaging Results              CT Abdomen Pelvis With Contrast (Final result)  Result time 08/30/23 15:37:17      Final result by Mahesh Meadows MD (08/30/23 15:37:17)                   Impression:      1. Findings suggesting hepatic steatosis, correlation with LFTs recommended.  2. No findings to suggest obstructive uropathy.  3. Please see above for additional findings.      Electronically signed by: Mahesh Meadows  MD  Date:    08/30/2023  Time:    15:37               Narrative:    EXAMINATION:  CT ABDOMEN PELVIS WITH CONTRAST    CLINICAL HISTORY:  Abdominal abscess/infection suspected;    TECHNIQUE:  Low dose axial images, sagittal and coronal reformations were obtained from the lung bases to the pubic symphysis following the IV administration of 100 mL of Omnipaque 350 .  Oral contrast was not given.    COMPARISON:  None.    FINDINGS:  Images of the lower thorax are remarkable for mild dependent atelectasis.    The liver is hypoattenuating suggesting steatosis, correlation with LFTs recommended.  The spleen, pancreas, gallbladder and adrenal glands are unremarkable.  There is no biliary dilation or ascites.  The portal vein, splenic vein, SMV, celiac axis and SMA all are patent.  No significant abdominal lymphadenopathy.    The kidneys enhance symmetrically without hydronephrosis or nephrolithiasis.  There is a low attenuating lesion arising from the lower pole of the left kidney measuring 1.4 cm, attenuation of which suggests cyst.  The bilateral ureters are unremarkable without calculi seen.  The urinary bladder is unremarkable.  The prostate is not enlarged.    The distal large bowel is for the most part decompressed.  The terminal ileum is unremarkable.  There is surgical change of appendectomy.  The small bowel is grossly unremarkable.  There are few scattered shotty periaortic, pericaval, and mesenteric lymph nodes.  No focal organized pelvic fluid collection.    There are degenerative changes of the spine.  No significant inguinal lymphadenopathy.                                       Medications   morphine injection 6 mg (6 mg Intravenous Given 8/30/23 1456)   ondansetron injection 4 mg (4 mg Intravenous Given 8/30/23 1455)   iohexoL (OMNIPAQUE 350) injection 100 mL (100 mLs Intravenous Given 8/30/23 1518)   sodium chloride 0.9% bolus 1,000 mL 1,000 mL (0 mLs Intravenous Stopped 8/30/23 1654)     Medical Decision  Making  Amount and/or Complexity of Data Reviewed  Labs: ordered. Decision-making details documented in ED Course.  Radiology: ordered.  ECG/medicine tests:  Decision-making details documented in ED Course.    Risk  Prescription drug management.               ED Course as of 09/04/23 1340   Wed Aug 30, 2023   1328 No STEMI [AC]   1425 Workup/treatment plan:   Evaluation for acute abdominal pain with nausea, vomiting, and diarrhea.  Differential diagnoses includes gastritis, enteritis, pancreatitis, cholelithiasis, cholecystitis, pyelonephritis, diverticulitis, and other conditions.  Obtaining labs (CBC, CMP, lipase, UA) and CT abdomen/pelvis with IV contrast.  Administering IV morphine and Zofran. [LP]   1533 EKG 12-lead  Independent interpretation:   Normal sinus rhythm. Ventricular rate 90 bpm.  Normal axis.  Normal QRS and QT intervals.  No ST segment elevation or depression.  Normal T-wave morphology. Overall impression:  Normal EKG. [LP]   1645 Comp. Metabolic Panel(!)  Mildly elevated transaminases.  Likely due to an acute viral illness or food-borne illness. [LP]   1646 CBC W/ AUTO DIFFERENTIAL  Normal. [LP]   1646 Urinalysis, Reflex to Urine Culture Urine, Clean Catch  Normal. [LP]   1646 Lipase: 14  Normal. [LP]      ED Course User Index  [AC] Brendon Ewing DO  [LP] Bran Sharma III, MD                    Clinical Impression:   Final diagnoses:  [R10.10] Upper abdominal pain (Primary)  [R11.2, R19.7] Nausea, vomiting, and diarrhea        ED Disposition Condition    Discharge Stable          ED Prescriptions       Medication Sig Dispense Start Date End Date Auth. Provider    ondansetron (ZOFRAN-ODT) 4 MG TbDL Take 1 tablet (4 mg total) by mouth every 6 (six) hours as needed (nausea). 10 tablet 8/30/2023 -- Bran Sharma III, MD    promethazine (PHENERGAN) 25 MG tablet Take 1 tablet (25 mg total) by mouth every 6 (six) hours as needed for Nausea. 20 tablet 8/30/2023 -- Bran Sharma III, MD           Follow-up Information       Follow up With Specialties Details Why Contact Info    Gwendolyn Upton MD Internal Medicine In 1 week  [address]  [place] [id number] [phone]      ER   Return to the ER for worsened symptoms or for any other immediate concerns.              Bran Sharma III, MD  09/04/23 7820

## 2023-08-30 NOTE — ED NOTES
I-STAT Chem-8+ Results:   Value Reference Range   Sodium 139 136-145 mmol/L   Potassium  3.9 3.5-5.1 mmol/L   Chloride 98  mmol/L   Ionized Calcium 1.11 1.06-1.42 mmol/L   CO2 (measured) 29 23-29 mmol/L   Glucose 106  mg/dL   BUN 8 6-30 mg/dL   Creatinine 0.8 0.5-1.4 mg/dL   Hematocrit 46 36-54%

## 2023-08-30 NOTE — DISCHARGE INSTRUCTIONS
We know that you have many choices and are honored that you chose us. We hope that we met or exceeded your expectations and goals for this visit and will keep the [facility] family in mind for your future needs and those of your family and friends.     - Dr. Sharma

## 2023-10-31 DIAGNOSIS — F41.1 GAD (GENERALIZED ANXIETY DISORDER): ICD-10-CM

## 2023-10-31 RX ORDER — ALPRAZOLAM 0.25 MG/1
0.25 TABLET ORAL NIGHTLY PRN
Qty: 30 TABLET | Refills: 0 | OUTPATIENT
Start: 2023-10-31 | End: 2023-11-30

## 2023-10-31 RX ORDER — TRIAMTERENE AND HYDROCHLOROTHIAZIDE 37.5; 25 MG/1; MG/1
1 CAPSULE ORAL EVERY MORNING
Qty: 30 CAPSULE | Refills: 11 | Status: SHIPPED | OUTPATIENT
Start: 2023-10-31 | End: 2024-10-30

## 2023-10-31 RX ORDER — MECLIZINE HCL 12.5 MG 12.5 MG/1
12.5 TABLET ORAL 2 TIMES DAILY PRN
Qty: 30 TABLET | Refills: 11 | Status: SHIPPED | OUTPATIENT
Start: 2023-10-31

## 2023-10-31 RX ORDER — ESCITALOPRAM OXALATE 10 MG/1
10 TABLET ORAL DAILY
Qty: 30 TABLET | Refills: 11 | OUTPATIENT
Start: 2023-10-31 | End: 2024-10-30

## 2023-11-07 ENCOUNTER — PATIENT MESSAGE (OUTPATIENT)
Dept: INTERNAL MEDICINE | Facility: CLINIC | Age: 38
End: 2023-11-07
Payer: COMMERCIAL

## 2023-11-08 ENCOUNTER — OFFICE VISIT (OUTPATIENT)
Dept: INTERNAL MEDICINE | Facility: CLINIC | Age: 38
End: 2023-11-08
Payer: COMMERCIAL

## 2023-11-08 VITALS
WEIGHT: 161.38 LBS | HEIGHT: 69 IN | BODY MASS INDEX: 23.9 KG/M2 | OXYGEN SATURATION: 97 % | TEMPERATURE: 99 F | DIASTOLIC BLOOD PRESSURE: 100 MMHG | SYSTOLIC BLOOD PRESSURE: 130 MMHG | HEART RATE: 80 BPM

## 2023-11-08 DIAGNOSIS — H66.002 NON-RECURRENT ACUTE SUPPURATIVE OTITIS MEDIA OF LEFT EAR WITHOUT SPONTANEOUS RUPTURE OF TYMPANIC MEMBRANE: Primary | ICD-10-CM

## 2023-11-08 DIAGNOSIS — J02.9 SORE THROAT: ICD-10-CM

## 2023-11-08 DIAGNOSIS — R05.1 ACUTE COUGH: ICD-10-CM

## 2023-11-08 DIAGNOSIS — F41.1 GAD (GENERALIZED ANXIETY DISORDER): ICD-10-CM

## 2023-11-08 DIAGNOSIS — Z00.00 ANNUAL PHYSICAL EXAM: ICD-10-CM

## 2023-11-08 DIAGNOSIS — H81.01 MENIERE'S DISEASE OF RIGHT EAR: ICD-10-CM

## 2023-11-08 DIAGNOSIS — H93.11 TINNITUS OF RIGHT EAR: ICD-10-CM

## 2023-11-08 DIAGNOSIS — E78.2 MIXED HYPERLIPIDEMIA: ICD-10-CM

## 2023-11-08 DIAGNOSIS — I10 HYPERTENSION, UNSPECIFIED TYPE: ICD-10-CM

## 2023-11-08 DIAGNOSIS — H90.41 SENSORINEURAL HEARING LOSS (SNHL) OF RIGHT EAR WITH UNRESTRICTED HEARING OF LEFT EAR: ICD-10-CM

## 2023-11-08 DIAGNOSIS — R50.9 FEVER, UNSPECIFIED FEVER CAUSE: ICD-10-CM

## 2023-11-08 LAB
CTP QC/QA: YES
MOLECULAR STREP A: NEGATIVE
POC MOLECULAR INFLUENZA A AGN: NEGATIVE
POC MOLECULAR INFLUENZA B AGN: NEGATIVE
SARS-COV-2 RDRP RESP QL NAA+PROBE: NEGATIVE

## 2023-11-08 PROCEDURE — 3075F SYST BP GE 130 - 139MM HG: CPT | Mod: CPTII,S$GLB,, | Performed by: NURSE PRACTITIONER

## 2023-11-08 PROCEDURE — 87651 POCT STREP A MOLECULAR: ICD-10-PCS | Mod: QW,S$GLB,, | Performed by: NURSE PRACTITIONER

## 2023-11-08 PROCEDURE — 87502 POCT INFLUENZA A/B MOLECULAR: ICD-10-PCS | Mod: QW,S$GLB,, | Performed by: NURSE PRACTITIONER

## 2023-11-08 PROCEDURE — 99214 OFFICE O/P EST MOD 30 MIN: CPT | Mod: S$GLB,,, | Performed by: NURSE PRACTITIONER

## 2023-11-08 PROCEDURE — 3075F PR MOST RECENT SYSTOLIC BLOOD PRESS GE 130-139MM HG: ICD-10-PCS | Mod: CPTII,S$GLB,, | Performed by: NURSE PRACTITIONER

## 2023-11-08 PROCEDURE — 87635 SARS-COV-2 COVID-19 AMP PRB: CPT | Mod: QW,S$GLB,, | Performed by: NURSE PRACTITIONER

## 2023-11-08 PROCEDURE — 87651 STREP A DNA AMP PROBE: CPT | Mod: QW,S$GLB,, | Performed by: NURSE PRACTITIONER

## 2023-11-08 PROCEDURE — 99214 PR OFFICE/OUTPT VISIT, EST, LEVL IV, 30-39 MIN: ICD-10-PCS | Mod: S$GLB,,, | Performed by: NURSE PRACTITIONER

## 2023-11-08 PROCEDURE — 1159F MED LIST DOCD IN RCRD: CPT | Mod: CPTII,S$GLB,, | Performed by: NURSE PRACTITIONER

## 2023-11-08 PROCEDURE — 3080F DIAST BP >= 90 MM HG: CPT | Mod: CPTII,S$GLB,, | Performed by: NURSE PRACTITIONER

## 2023-11-08 PROCEDURE — 3080F PR MOST RECENT DIASTOLIC BLOOD PRESSURE >= 90 MM HG: ICD-10-PCS | Mod: CPTII,S$GLB,, | Performed by: NURSE PRACTITIONER

## 2023-11-08 PROCEDURE — 87635: ICD-10-PCS | Mod: QW,S$GLB,, | Performed by: NURSE PRACTITIONER

## 2023-11-08 PROCEDURE — 99999 PR PBB SHADOW E&M-EST. PATIENT-LVL V: ICD-10-PCS | Mod: PBBFAC,,, | Performed by: NURSE PRACTITIONER

## 2023-11-08 PROCEDURE — 87502 INFLUENZA DNA AMP PROBE: CPT | Mod: QW,S$GLB,, | Performed by: NURSE PRACTITIONER

## 2023-11-08 PROCEDURE — 1159F PR MEDICATION LIST DOCUMENTED IN MEDICAL RECORD: ICD-10-PCS | Mod: CPTII,S$GLB,, | Performed by: NURSE PRACTITIONER

## 2023-11-08 PROCEDURE — 3008F PR BODY MASS INDEX (BMI) DOCUMENTED: ICD-10-PCS | Mod: CPTII,S$GLB,, | Performed by: NURSE PRACTITIONER

## 2023-11-08 PROCEDURE — 3008F BODY MASS INDEX DOCD: CPT | Mod: CPTII,S$GLB,, | Performed by: NURSE PRACTITIONER

## 2023-11-08 PROCEDURE — 99999 PR PBB SHADOW E&M-EST. PATIENT-LVL V: CPT | Mod: PBBFAC,,, | Performed by: NURSE PRACTITIONER

## 2023-11-08 RX ORDER — AMOXICILLIN 500 MG/1
500 TABLET, FILM COATED ORAL EVERY 12 HOURS
Qty: 14 TABLET | Refills: 0 | Status: SHIPPED | OUTPATIENT
Start: 2023-11-08 | End: 2023-11-15

## 2023-11-08 NOTE — PROGRESS NOTES
INTERNAL MEDICINE URGENT CARE NOTE    CHIEF COMPLAINT     Chief Complaint   Patient presents with    Nasal Congestion    Sore Throat    Headache    Fever       HPI     Kaden Heller is a 38 y.o. male with GUSTABO, migraines, SNHL, AR, HLD, tinnitus who presents for an urgent visit today.    Here with c/o nasal congestion, sore throat, headache and fever   Started Monday (3 days ago) with sore throat progressively worsening. Yesterday started with nasal congestion   Cough - nonproductive  Headache - horrible   Treated with dayquil cough syrup     GUSTABO- no longer taking lexparo 10mg - stopped this summer and was stable but is now feeling more anxious   Stopped 2/2 sweating     HLD- taking crestor 10mg     HTN- taking dyazide         Past Medical History:  Past Medical History:   Diagnosis Date    Food allergy 2015    micheal and hazelnut    GUSTABO (generalized anxiety disorder) 4/20/2022    Hearing loss 11/27/2020    same time tinnitus started 24/7    Meniere disease 12/2019    Mixed hyperlipidemia 02/18/2019    Seasonal allergies        Home Medications:  Prior to Admission medications    Medication Sig Start Date End Date Taking? Authorizing Provider   loratadine (CLARITIN) 5 mg chewable tablet Take 5 mg by mouth once daily.   Yes Provider, Historical   meclizine (ANTIVERT) 12.5 mg tablet Take 1 tablet (12.5 mg total) by mouth 2 (two) times daily as needed for Dizziness. 10/31/23  Yes Tristen Louis PA-C   mometasone (NASONEX) 50 mcg/actuation nasal spray Use 2 sprays by Nasal route once daily. 7/12/22  Yes AGUSTIN Saravia MD   montelukast (SINGULAIR) 10 mg tablet Take 1 tablet by mouth every evening. 11/8/22  Yes AGUSTIN Saravia MD   rosuvastatin (CRESTOR) 10 MG tablet Take 1 tablet (10 mg total) by mouth once daily. 11/8/22 11/8/23 Yes Agustina Wells MD   triamterene-hydrochlorothiazide 37.5-25 mg (DYAZIDE) 37.5-25 mg per capsule Take 1 capsule by mouth every morning. 10/31/23 10/30/24 Yes Tristen Louis PA-C    ALPRAZolam (XANAX) 0.25 MG tablet Take 1 tablet (0.25 mg total) by mouth nightly as needed (panic attack). 4/20/22 7/22/22  Lacey Han NP   EScitalopram oxalate (LEXAPRO) 10 MG tablet Take 1 tablet (10 mg total) by mouth once daily. 9/20/22 9/20/23  Lacey Han NP   ondansetron (ZOFRAN-ODT) 4 MG TbDL Take 1 tablet (4 mg total) by mouth every 6 (six) hours as needed (nausea).  Patient not taking: Reported on 11/8/2023 8/30/23   Bran Sharma III, MD   predniSONE (DELTASONE) 10 MG tablet Take Three tablets by mouth for 3 days, then two tablets for 3 days, then one tablet for 3 days.  Best if taken as single dose early in the morning with food. 11/8/22   AGUSTIN Saravia MD   promethazine (PHENERGAN) 25 MG tablet Take 1 tablet (25 mg total) by mouth every 6 (six) hours as needed for Nausea.  Patient not taking: Reported on 11/8/2023 8/30/23   Bran Sharma III, MD       Review of Systems:  Review of Systems   Constitutional:  Positive for fatigue and fever (subjective). Negative for chills.   HENT:  Positive for congestion, postnasal drip, sore throat and voice change. Negative for trouble swallowing.    Respiratory:  Positive for cough and chest tightness. Negative for shortness of breath and wheezing.    Cardiovascular:  Negative for chest pain and palpitations.   Gastrointestinal:  Negative for abdominal pain, constipation, diarrhea, nausea and vomiting.   Musculoskeletal:  Negative for arthralgias and myalgias.   Neurological:  Positive for headaches. Negative for dizziness and light-headedness.   Psychiatric/Behavioral:  The patient is nervous/anxious.        Health Maintainence:   Immunizations:  Health Maintenance         Date Due Completion Date    Pneumococcal Vaccines (Age 0-64) (1 - PCV) Never done ---    TETANUS VACCINE Never done ---    Influenza Vaccine (1) 09/01/2023 12/1/2022    COVID-19 Vaccine (4 - 2023-24 season) 09/01/2023 10/14/2021    Lipid Panel 02/02/2028 2/2/2023  "            PHYSICAL EXAM     BP (!) 130/96 (BP Location: Left arm, Patient Position: Sitting, BP Method: Medium (Manual))   Pulse 80   Temp 98.5 °F (36.9 °C) (Oral)   Ht 5' 9" (1.753 m)   Wt 73.2 kg (161 lb 6 oz)   SpO2 97%   BMI 23.83 kg/m²     Physical Exam  Vitals reviewed.   Constitutional:       Appearance: He is well-developed.   HENT:      Head: Normocephalic.      Right Ear: Tympanic membrane and external ear normal.      Left Ear: External ear normal. Tympanic membrane is erythematous and bulging.      Ears:        Nose: Nose normal.      Mouth/Throat:      Pharynx: Posterior oropharyngeal erythema present. No oropharyngeal exudate.   Eyes:      Pupils: Pupils are equal, round, and reactive to light.   Neck:      Thyroid: No thyromegaly.      Vascular: No JVD.      Trachea: No tracheal deviation.   Cardiovascular:      Rate and Rhythm: Normal rate and regular rhythm.      Heart sounds: No murmur heard.     No friction rub. No gallop.   Pulmonary:      Effort: No respiratory distress.      Breath sounds: Normal breath sounds. No wheezing or rales.   Chest:      Chest wall: No tenderness.   Abdominal:      General: Bowel sounds are normal. There is no distension.      Palpations: Abdomen is soft.      Tenderness: There is no abdominal tenderness.   Musculoskeletal:         General: No tenderness. Normal range of motion.   Lymphadenopathy:      Cervical: No cervical adenopathy.   Skin:     General: Skin is warm and dry.      Findings: No rash.   Neurological:      Mental Status: He is alert and oriented to person, place, and time.   Psychiatric:         Behavior: Behavior normal.         LABS     No results found for: "LABA1C", "HGBA1C"  CMP  Sodium   Date Value Ref Range Status   08/30/2023 139 136 - 145 mmol/L Final     Potassium   Date Value Ref Range Status   08/30/2023 3.5 3.5 - 5.1 mmol/L Final     Chloride   Date Value Ref Range Status   08/30/2023 103 95 - 110 mmol/L Final     CO2   Date Value " Ref Range Status   08/30/2023 26 23 - 29 mmol/L Final     Glucose   Date Value Ref Range Status   08/30/2023 103 70 - 110 mg/dL Final     BUN   Date Value Ref Range Status   08/30/2023 7 6 - 20 mg/dL Final     Creatinine   Date Value Ref Range Status   08/30/2023 0.9 0.5 - 1.4 mg/dL Final     Calcium   Date Value Ref Range Status   08/30/2023 9.3 8.7 - 10.5 mg/dL Final     Total Protein   Date Value Ref Range Status   08/30/2023 7.5 6.0 - 8.4 g/dL Final     Albumin   Date Value Ref Range Status   08/30/2023 4.3 3.5 - 5.2 g/dL Final     Total Bilirubin   Date Value Ref Range Status   08/30/2023 0.8 0.1 - 1.0 mg/dL Final     Comment:     For infants and newborns, interpretation of results should be based  on gestational age, weight and in agreement with clinical  observations.    Premature Infant recommended reference ranges:  Up to 24 hours.............<8.0 mg/dL  Up to 48 hours............<12.0 mg/dL  3-5 days..................<15.0 mg/dL  6-29 days.................<15.0 mg/dL       Alkaline Phosphatase   Date Value Ref Range Status   08/30/2023 59 55 - 135 U/L Final     AST   Date Value Ref Range Status   08/30/2023 80 (H) 10 - 40 U/L Final     ALT   Date Value Ref Range Status   08/30/2023 138 (H) 10 - 44 U/L Final     Anion Gap   Date Value Ref Range Status   08/30/2023 10 8 - 16 mmol/L Final     eGFR if    Date Value Ref Range Status   04/18/2022 >60.0 >60 mL/min/1.73 m^2 Final     eGFR if non    Date Value Ref Range Status   04/18/2022 >60.0 >60 mL/min/1.73 m^2 Final     Comment:     Calculation used to obtain the estimated glomerular filtration  rate (eGFR) is the CKD-EPI equation.        Lab Results   Component Value Date    WBC 7.46 08/30/2023    HGB 15.3 08/30/2023    HCT 46 08/30/2023    MCV 91 08/30/2023     08/30/2023     Lab Results   Component Value Date    CHOL 167 02/02/2023    CHOL 274 (H) 10/24/2022    CHOL 197 04/26/2022     Lab Results   Component Value Date     HDL 37 (L) 02/02/2023    HDL 40 10/24/2022    HDL 41 04/26/2022     Lab Results   Component Value Date    LDLCALC 100.0 02/02/2023    LDLCALC 175.6 (H) 10/24/2022    LDLCALC 127.8 04/26/2022     Lab Results   Component Value Date    TRIG 150 02/02/2023    TRIG 292 (H) 10/24/2022    TRIG 141 04/26/2022     Lab Results   Component Value Date    CHOLHDL 22.2 02/02/2023    CHOLHDL 14.6 (L) 10/24/2022    CHOLHDL 20.8 04/26/2022     Lab Results   Component Value Date    TSH 1.451 04/18/2022       ASSESSMENT/PLAN     Kaden Heller is a 38 y.o. male      Acute cough- all POCT negative. Will treat with Amoxicillin 500mg bid, mucinex, avoid nasal decongestants. Alternate tylenol and advil   -     POCT Strep A, Molecular  -     POCT Influenza A/B Molecular  -     POCT COVID-19 Rapid Screening    Sore throat- all POCT negative. Will treat with Amoxicillin 500mg bid, mucinex, avoid nasal decongestants. Alternate tylenol and advil   -     POCT Strep A, Molecular  -     POCT Influenza A/B Molecular  -     POCT COVID-19 Rapid Screening    Fever, unspecified fever cause- all POCT negative. Will treat with Amoxicillin 500mg bid, mucinex, avoid nasal decongestants. Alternate tylenol and advil   -     POCT Strep A, Molecular  -     POCT Influenza A/B Molecular  -     POCT COVID-19 Rapid Screening    Annual physical exam- labs prior to annual next week   -     CBC Auto Differential; Future; Expected date: 11/08/2023  -     Comprehensive Metabolic Panel; Future; Expected date: 11/08/2023  -     Hemoglobin A1C; Future; Expected date: 11/08/2023  -     Lipid Panel; Future; Expected date: 11/08/2023  -     TSH; Future; Expected date: 11/08/2023    GUSTABO (generalized anxiety disorder)- stable. Stopped lexparo, will discuss new medication at next visit     Mixed hyperlipidemia- will cont crestor, check FLP   -     Lipid Panel; Future; Expected date: 11/08/2023    Meniere's disease of right ear/Tinnitus of right ear/Sensorineural hearing loss  (SNHL) of right ear with unrestricted hearing of left ear- f/u with ENT     Hypertension, unspecified type- poorly controlled off meds. Will restart medicaiton and recheck BP at next visit   -     Comprehensive Metabolic Panel; Future; Expected date: 11/08/2023      Follow up with PCP    Patient education provided from [facility]. Patient was counseled on when and how to seek emergent care.       Lacey LOVE, FRANCIA, FNP-c   Department of Internal Medicine - [facility]  3:27 PM

## 2023-11-08 NOTE — PATIENT INSTRUCTIONS
Start amoxicillin 500mg twice daily for 1 week   Mucinex 600mg twice daily with full glass of water   Alternate tylenol and advil

## 2023-11-15 ENCOUNTER — LAB VISIT (OUTPATIENT)
Dept: LAB | Facility: HOSPITAL | Age: 38
End: 2023-11-15
Payer: COMMERCIAL

## 2023-11-15 DIAGNOSIS — I10 HYPERTENSION, UNSPECIFIED TYPE: ICD-10-CM

## 2023-11-15 DIAGNOSIS — E78.2 MIXED HYPERLIPIDEMIA: ICD-10-CM

## 2023-11-15 DIAGNOSIS — Z00.00 ANNUAL PHYSICAL EXAM: ICD-10-CM

## 2023-11-15 LAB
ALBUMIN SERPL BCP-MCNC: 4.9 G/DL (ref 3.5–5.2)
ALP SERPL-CCNC: 74 U/L (ref 55–135)
ALT SERPL W/O P-5'-P-CCNC: 296 U/L (ref 10–44)
ANION GAP SERPL CALC-SCNC: 15 MMOL/L (ref 8–16)
AST SERPL-CCNC: 148 U/L (ref 10–40)
BASOPHILS # BLD AUTO: 0.03 K/UL (ref 0–0.2)
BASOPHILS NFR BLD: 0.5 % (ref 0–1.9)
BILIRUB SERPL-MCNC: 1.4 MG/DL (ref 0.1–1)
BUN SERPL-MCNC: 16 MG/DL (ref 6–20)
CALCIUM SERPL-MCNC: 10.6 MG/DL (ref 8.7–10.5)
CHLORIDE SERPL-SCNC: 97 MMOL/L (ref 95–110)
CHOLEST SERPL-MCNC: 225 MG/DL (ref 120–199)
CHOLEST/HDLC SERPL: 5.9 {RATIO} (ref 2–5)
CO2 SERPL-SCNC: 27 MMOL/L (ref 23–29)
CREAT SERPL-MCNC: 1 MG/DL (ref 0.5–1.4)
DIFFERENTIAL METHOD: NORMAL
EOSINOPHIL # BLD AUTO: 0.1 K/UL (ref 0–0.5)
EOSINOPHIL NFR BLD: 0.8 % (ref 0–8)
ERYTHROCYTE [DISTWIDTH] IN BLOOD BY AUTOMATED COUNT: 12 % (ref 11.5–14.5)
EST. GFR  (NO RACE VARIABLE): >60 ML/MIN/1.73 M^2
ESTIMATED AVG GLUCOSE: 103 MG/DL (ref 68–131)
GLUCOSE SERPL-MCNC: 84 MG/DL (ref 70–110)
HBA1C MFR BLD: 5.2 % (ref 4–5.6)
HCT VFR BLD AUTO: 50.4 % (ref 40–54)
HDLC SERPL-MCNC: 38 MG/DL (ref 40–75)
HDLC SERPL: 16.9 % (ref 20–50)
HGB BLD-MCNC: 17.2 G/DL (ref 14–18)
IMM GRANULOCYTES # BLD AUTO: 0.02 K/UL (ref 0–0.04)
IMM GRANULOCYTES NFR BLD AUTO: 0.3 % (ref 0–0.5)
LDLC SERPL CALC-MCNC: 145.8 MG/DL (ref 63–159)
LYMPHOCYTES # BLD AUTO: 2 K/UL (ref 1–4.8)
LYMPHOCYTES NFR BLD: 32.1 % (ref 18–48)
MCH RBC QN AUTO: 31 PG (ref 27–31)
MCHC RBC AUTO-ENTMCNC: 34.1 G/DL (ref 32–36)
MCV RBC AUTO: 91 FL (ref 82–98)
MONOCYTES # BLD AUTO: 0.6 K/UL (ref 0.3–1)
MONOCYTES NFR BLD: 9.1 % (ref 4–15)
NEUTROPHILS # BLD AUTO: 3.6 K/UL (ref 1.8–7.7)
NEUTROPHILS NFR BLD: 57.2 % (ref 38–73)
NONHDLC SERPL-MCNC: 187 MG/DL
NRBC BLD-RTO: 0 /100 WBC
PLATELET # BLD AUTO: 229 K/UL (ref 150–450)
PMV BLD AUTO: 11.6 FL (ref 9.2–12.9)
POTASSIUM SERPL-SCNC: 3.3 MMOL/L (ref 3.5–5.1)
PROT SERPL-MCNC: 8.7 G/DL (ref 6–8.4)
RBC # BLD AUTO: 5.54 M/UL (ref 4.6–6.2)
SODIUM SERPL-SCNC: 139 MMOL/L (ref 136–145)
TRIGL SERPL-MCNC: 206 MG/DL (ref 30–150)
TSH SERPL DL<=0.005 MIU/L-ACNC: 1.56 UIU/ML (ref 0.4–4)
WBC # BLD AUTO: 6.23 K/UL (ref 3.9–12.7)

## 2023-11-15 PROCEDURE — 83036 HEMOGLOBIN GLYCOSYLATED A1C: CPT | Performed by: NURSE PRACTITIONER

## 2023-11-15 PROCEDURE — 84443 ASSAY THYROID STIM HORMONE: CPT | Performed by: NURSE PRACTITIONER

## 2023-11-15 PROCEDURE — 80053 COMPREHEN METABOLIC PANEL: CPT | Performed by: NURSE PRACTITIONER

## 2023-11-15 PROCEDURE — 80061 LIPID PANEL: CPT | Performed by: NURSE PRACTITIONER

## 2023-11-15 PROCEDURE — 85025 COMPLETE CBC W/AUTO DIFF WBC: CPT | Performed by: NURSE PRACTITIONER

## 2023-11-15 PROCEDURE — 36415 COLL VENOUS BLD VENIPUNCTURE: CPT | Performed by: NURSE PRACTITIONER

## 2023-11-16 ENCOUNTER — LAB VISIT (OUTPATIENT)
Dept: LAB | Facility: HOSPITAL | Age: 38
End: 2023-11-16
Payer: COMMERCIAL

## 2023-11-16 ENCOUNTER — OFFICE VISIT (OUTPATIENT)
Dept: INTERNAL MEDICINE | Facility: CLINIC | Age: 38
End: 2023-11-16
Payer: COMMERCIAL

## 2023-11-16 VITALS
HEART RATE: 90 BPM | BODY MASS INDEX: 23.47 KG/M2 | HEIGHT: 69 IN | WEIGHT: 158.5 LBS | DIASTOLIC BLOOD PRESSURE: 70 MMHG | SYSTOLIC BLOOD PRESSURE: 120 MMHG | OXYGEN SATURATION: 98 %

## 2023-11-16 DIAGNOSIS — Z29.81 ENCOUNTER FOR HIV PRE-EXPOSURE PROPHYLAXIS: ICD-10-CM

## 2023-11-16 DIAGNOSIS — Z72.52 HIGH RISK HOMOSEXUAL BEHAVIOR: ICD-10-CM

## 2023-11-16 DIAGNOSIS — I10 HYPERTENSION, UNSPECIFIED TYPE: ICD-10-CM

## 2023-11-16 DIAGNOSIS — F41.0 PANIC DISORDER: ICD-10-CM

## 2023-11-16 DIAGNOSIS — E78.1 HYPERTRIGLYCERIDEMIA: ICD-10-CM

## 2023-11-16 DIAGNOSIS — E78.2 MIXED HYPERLIPIDEMIA: ICD-10-CM

## 2023-11-16 DIAGNOSIS — K76.0 FATTY LIVER: ICD-10-CM

## 2023-11-16 DIAGNOSIS — R79.89 ELEVATED LFTS: ICD-10-CM

## 2023-11-16 DIAGNOSIS — H81.01 MENIERE'S DISEASE OF RIGHT EAR: ICD-10-CM

## 2023-11-16 DIAGNOSIS — E83.52 HYPERCALCEMIA: ICD-10-CM

## 2023-11-16 DIAGNOSIS — H90.41 SENSORINEURAL HEARING LOSS (SNHL) OF RIGHT EAR WITH UNRESTRICTED HEARING OF LEFT EAR: ICD-10-CM

## 2023-11-16 DIAGNOSIS — R51.9 NONINTRACTABLE HEADACHE, UNSPECIFIED CHRONICITY PATTERN, UNSPECIFIED HEADACHE TYPE: ICD-10-CM

## 2023-11-16 DIAGNOSIS — F41.1 GAD (GENERALIZED ANXIETY DISORDER): ICD-10-CM

## 2023-11-16 DIAGNOSIS — Z82.49 FAMILY HISTORY OF CARDIOVASCULAR DISEASE: ICD-10-CM

## 2023-11-16 DIAGNOSIS — G43.109 MIGRAINE EQUIVALENT SYNDROME: ICD-10-CM

## 2023-11-16 DIAGNOSIS — Z00.00 ANNUAL PHYSICAL EXAM: Primary | ICD-10-CM

## 2023-11-16 LAB
25(OH)D3+25(OH)D2 SERPL-MCNC: 22 NG/ML (ref 30–96)
ANION GAP SERPL CALC-SCNC: 14 MMOL/L (ref 8–16)
BUN SERPL-MCNC: 14 MG/DL (ref 6–20)
CALCIUM SERPL-MCNC: 10.6 MG/DL (ref 8.7–10.5)
CHLORIDE SERPL-SCNC: 97 MMOL/L (ref 95–110)
CO2 SERPL-SCNC: 28 MMOL/L (ref 23–29)
CREAT SERPL-MCNC: 1 MG/DL (ref 0.5–1.4)
EST. GFR  (NO RACE VARIABLE): >60 ML/MIN/1.73 M^2
GLUCOSE SERPL-MCNC: 91 MG/DL (ref 70–110)
HAV IGM SERPL QL IA: NORMAL
HBV CORE IGM SERPL QL IA: NORMAL
HBV SURFACE AG SERPL QL IA: NORMAL
HCV AB SERPL QL IA: NORMAL
HIV 1+2 AB+HIV1 P24 AG SERPL QL IA: NORMAL
POTASSIUM SERPL-SCNC: 3.7 MMOL/L (ref 3.5–5.1)
PTH-INTACT SERPL-MCNC: 22.2 PG/ML (ref 9–77)
SODIUM SERPL-SCNC: 139 MMOL/L (ref 136–145)

## 2023-11-16 PROCEDURE — 36415 COLL VENOUS BLD VENIPUNCTURE: CPT | Performed by: NURSE PRACTITIONER

## 2023-11-16 PROCEDURE — 87491 CHLMYD TRACH DNA AMP PROBE: CPT | Performed by: NURSE PRACTITIONER

## 2023-11-16 PROCEDURE — 3074F SYST BP LT 130 MM HG: CPT | Mod: CPTII,S$GLB,, | Performed by: NURSE PRACTITIONER

## 2023-11-16 PROCEDURE — 80048 BASIC METABOLIC PNL TOTAL CA: CPT | Performed by: NURSE PRACTITIONER

## 2023-11-16 PROCEDURE — 1159F MED LIST DOCD IN RCRD: CPT | Mod: CPTII,S$GLB,, | Performed by: NURSE PRACTITIONER

## 2023-11-16 PROCEDURE — 86592 SYPHILIS TEST NON-TREP QUAL: CPT | Performed by: NURSE PRACTITIONER

## 2023-11-16 PROCEDURE — 3074F PR MOST RECENT SYSTOLIC BLOOD PRESSURE < 130 MM HG: ICD-10-PCS | Mod: CPTII,S$GLB,, | Performed by: NURSE PRACTITIONER

## 2023-11-16 PROCEDURE — 99395 PR PREVENTIVE VISIT,EST,18-39: ICD-10-PCS | Mod: S$GLB,,, | Performed by: NURSE PRACTITIONER

## 2023-11-16 PROCEDURE — 99999 PR PBB SHADOW E&M-EST. PATIENT-LVL V: CPT | Mod: PBBFAC,,, | Performed by: NURSE PRACTITIONER

## 2023-11-16 PROCEDURE — 1159F PR MEDICATION LIST DOCUMENTED IN MEDICAL RECORD: ICD-10-PCS | Mod: CPTII,S$GLB,, | Performed by: NURSE PRACTITIONER

## 2023-11-16 PROCEDURE — 3044F PR MOST RECENT HEMOGLOBIN A1C LEVEL <7.0%: ICD-10-PCS | Mod: CPTII,S$GLB,, | Performed by: NURSE PRACTITIONER

## 2023-11-16 PROCEDURE — 3008F BODY MASS INDEX DOCD: CPT | Mod: CPTII,S$GLB,, | Performed by: NURSE PRACTITIONER

## 2023-11-16 PROCEDURE — 3078F PR MOST RECENT DIASTOLIC BLOOD PRESSURE < 80 MM HG: ICD-10-PCS | Mod: CPTII,S$GLB,, | Performed by: NURSE PRACTITIONER

## 2023-11-16 PROCEDURE — 99395 PREV VISIT EST AGE 18-39: CPT | Mod: S$GLB,,, | Performed by: NURSE PRACTITIONER

## 2023-11-16 PROCEDURE — 3044F HG A1C LEVEL LT 7.0%: CPT | Mod: CPTII,S$GLB,, | Performed by: NURSE PRACTITIONER

## 2023-11-16 PROCEDURE — 3078F DIAST BP <80 MM HG: CPT | Mod: CPTII,S$GLB,, | Performed by: NURSE PRACTITIONER

## 2023-11-16 PROCEDURE — 80074 ACUTE HEPATITIS PANEL: CPT | Performed by: NURSE PRACTITIONER

## 2023-11-16 PROCEDURE — 99999 PR PBB SHADOW E&M-EST. PATIENT-LVL V: ICD-10-PCS | Mod: PBBFAC,,, | Performed by: NURSE PRACTITIONER

## 2023-11-16 PROCEDURE — 82306 VITAMIN D 25 HYDROXY: CPT | Performed by: NURSE PRACTITIONER

## 2023-11-16 PROCEDURE — 83970 ASSAY OF PARATHORMONE: CPT | Performed by: NURSE PRACTITIONER

## 2023-11-16 PROCEDURE — 87389 HIV-1 AG W/HIV-1&-2 AB AG IA: CPT | Performed by: NURSE PRACTITIONER

## 2023-11-16 PROCEDURE — 3008F PR BODY MASS INDEX (BMI) DOCUMENTED: ICD-10-PCS | Mod: CPTII,S$GLB,, | Performed by: NURSE PRACTITIONER

## 2023-11-16 RX ORDER — FLUOXETINE 10 MG/1
10 CAPSULE ORAL DAILY
Qty: 30 CAPSULE | Refills: 11 | Status: SHIPPED | OUTPATIENT
Start: 2023-11-16 | End: 2024-01-26 | Stop reason: SDUPTHER

## 2023-11-16 RX ORDER — ALPRAZOLAM 0.25 MG/1
0.25 TABLET ORAL NIGHTLY PRN
Qty: 30 TABLET | Refills: 0 | Status: SHIPPED | OUTPATIENT
Start: 2023-11-16 | End: 2023-12-16

## 2023-11-16 NOTE — PROGRESS NOTES
Internal Medicine Annual Exam       CHIEF COMPLAINT     The patient, Kaden Heller, who is a 38 y.o. male with GUSTABO, tinnitus and SNHL, meniere;s disease, HTN, HLD and migraines presents for an annual exam.    HPI     GUSTABO- taking lexparo 10mg - stopped 2/2 excessive sweating   Using xanax 0.25mg as needed for panic attacks     Decreased appetite with weight loss- last year 159 increased to 167 8/30/2023 and down to 158 today     Hypercalcemia- not taking multivitamin     HLD- taking crestor   Reviewed recent FLP     Meniere's disease - followed by ENT - taking triamterene-HCTZ. Potassium depleted and new fatty liver. needs follow up - having worsening head pressure and hearing loss on the left     Pressure to the right posterior neck - describes as tightness   On the left - feels airbubble like feeling   Sharp pain to th left parietal region   Floaters in vision         Past Medical History:  Past Medical History:   Diagnosis Date    Food allergy 2015    micheal and hazelnut    GUSTABO (generalized anxiety disorder) 4/20/2022    Hearing loss 11/27/2020    same time tinnitus started 24/7    Hypertension 11/8/2023    Meniere disease 12/2019    Mixed hyperlipidemia 02/18/2019    Seasonal allergies        Past Surgical History:   Procedure Laterality Date    APPENDECTOMY      HIP ARTHROSCOPY W/ LABRAL REPAIR      HIP SURGERY  3/3/2006    arthriscopic        Family History   Problem Relation Age of Onset    Heart disease Mother     Heart attack Mother     Hypertension Mother         horrible heart disease on mother's side    Asthma Father     Anemia Sister     No Known Problems Daughter     No Known Problems Son     Heart attack Maternal Uncle     Heart disease Maternal Uncle     Heart attack Maternal Grandmother     Heart disease Maternal Grandmother     Heart attack Maternal Grandfather     Heart disease Maternal Grandfather         Social History     Socioeconomic History    Marital status: Single   Tobacco Use    Smoking  status: Never    Smokeless tobacco: Never   Substance and Sexual Activity    Alcohol use: Yes     Alcohol/week: 3.0 standard drinks of alcohol     Types: 2 Cans of beer, 1 Shots of liquor per week     Comment: occas    Drug use: Never    Sexual activity: Never     Birth control/protection: None     Social Determinants of Health     Financial Resource Strain: Low Risk  (7/22/2022)    Overall Financial Resource Strain (CARDIA)     Difficulty of Paying Living Expenses: Not very hard   Food Insecurity: No Food Insecurity (7/22/2022)    Hunger Vital Sign     Worried About Running Out of Food in the Last Year: Never true     Ran Out of Food in the Last Year: Never true   Transportation Needs: No Transportation Needs (7/22/2022)    PRAPARE - Transportation     Lack of Transportation (Medical): No     Lack of Transportation (Non-Medical): No   Physical Activity: Insufficiently Active (7/22/2022)    Exercise Vital Sign     Days of Exercise per Week: 3 days     Minutes of Exercise per Session: 40 min   Stress: Stress Concern Present (7/22/2022)    [employer] - Occupational Stress Questionnaire     Feeling of Stress : To some extent   Social Connections: Unknown (7/22/2022)    Social Connection and Isolation Panel [NHANES]     Frequency of Communication with Friends and Family: More than three times a week     Frequency of Social Gatherings with Friends and Family: More than three times a week     Active Member of Clubs or Organizations: Yes     Attends Club or Organization Meetings: More than 4 times per year     Marital Status: Never    Housing Stability: Low Risk  (7/22/2022)    Housing Stability Vital Sign     Unable to Pay for Housing in the Last Year: No     Number of Places Lived in the Last Year: 2     Unstable Housing in the Last Year: No        Social History     Tobacco Use   Smoking Status Never   Smokeless Tobacco Never        Allergies as of 11/16/2023 - Reviewed 11/16/2023    Allergen Reaction Noted    Hazelnut Itching, Other (See Comments), and Swelling 03/21/2017    Pérez Itching, Other (See Comments), and Swelling 03/21/2017          Home Medications:  Prior to Admission medications    Medication Sig Start Date End Date Taking? Authorizing Provider   loratadine (CLARITIN) 5 mg chewable tablet Take 5 mg by mouth once daily.   Yes Provider, Historical   meclizine (ANTIVERT) 12.5 mg tablet Take 1 tablet (12.5 mg total) by mouth 2 (two) times daily as needed for Dizziness. 10/31/23  Yes Tristen Louis PA-C   mometasone (NASONEX) 50 mcg/actuation nasal spray Use 2 sprays by Nasal route once daily. 7/12/22  Yes AGUSTIN Saravia MD   montelukast (SINGULAIR) 10 mg tablet Take 1 tablet by mouth every evening. 11/8/22  Yes AGUSTIN Saravia MD   triamterene-hydrochlorothiazide 37.5-25 mg (DYAZIDE) 37.5-25 mg per capsule Take 1 capsule by mouth every morning. 10/31/23 10/30/24 Yes Tristen Louis PA-C   ALPRAZolam (XANAX) 0.25 MG tablet Take 1 tablet (0.25 mg total) by mouth nightly as needed (panic attack). 4/20/22 7/22/22  Lacey Han NP   amoxicillin (AMOXIL) 500 MG Tab Take 1 tablet (500 mg total) by mouth every 12 (twelve) hours. for 7 days 11/8/23 11/15/23  Lacey Han NP   EScitalopram oxalate (LEXAPRO) 10 MG tablet Take 1 tablet (10 mg total) by mouth once daily. 9/20/22 9/20/23  Lacey Han NP   ondansetron (ZOFRAN-ODT) 4 MG TbDL Take 1 tablet (4 mg total) by mouth every 6 (six) hours as needed (nausea).  Patient not taking: Reported on 11/8/2023 8/30/23   Bran Sharma III, MD   promethazine (PHENERGAN) 25 MG tablet Take 1 tablet (25 mg total) by mouth every 6 (six) hours as needed for Nausea.  Patient not taking: Reported on 11/8/2023 8/30/23   Bran Sharma III, MD   rosuvastatin (CRESTOR) 10 MG tablet Take 1 tablet (10 mg total) by mouth once daily. 11/8/22 11/8/23  Agustina Wells MD       Review of Systems:  Review of Systems  "  Constitutional:  Negative for chills, fatigue, fever and unexpected weight change.   HENT:  Positive for ear pain and hearing loss. Negative for congestion, postnasal drip, rhinorrhea, sinus pressure and trouble swallowing.    Eyes:  Positive for visual disturbance. Negative for pain and redness.   Respiratory:  Negative for cough, shortness of breath and wheezing.    Cardiovascular:  Negative for chest pain and palpitations.   Gastrointestinal:  Negative for abdominal distention and abdominal pain.   Endocrine: Negative for polydipsia, polyphagia and polyuria.   Genitourinary:  Negative for dysuria, frequency, hematuria and urgency.   Musculoskeletal:  Negative for arthralgias, gait problem and myalgias.   Skin:  Negative for pallor and rash.   Allergic/Immunologic: Negative for environmental allergies and immunocompromised state.   Neurological:  Positive for dizziness, light-headedness and headaches. Negative for weakness.   Hematological:  Negative for adenopathy. Does not bruise/bleed easily.   Psychiatric/Behavioral:  Negative for behavioral problems, confusion and sleep disturbance. The patient is nervous/anxious.        Health Maintainence:   Immunizations:  Health Maintenance         Date Due Completion Date    Pneumococcal Vaccines (Age 0-64) (1 - PCV) Never done ---    TETANUS VACCINE Never done ---    Influenza Vaccine (1) 09/01/2023 12/1/2022    COVID-19 Vaccine (4 - 2023-24 season) 09/01/2023 10/14/2021    Lipid Panel 11/15/2028 11/15/2023             PHYSICAL EXAM     /70 (BP Location: Right arm, Patient Position: Sitting, BP Method: Medium (Manual))   Pulse 90   Ht 5' 9" (1.753 m)   Wt 71.9 kg (158 lb 8.2 oz)   SpO2 98%   BMI 23.41 kg/m²  Body mass index is 23.41 kg/m².    Physical Exam  Vitals reviewed.   Constitutional:       Appearance: He is well-developed.   HENT:      Head: Normocephalic.      Right Ear: External ear normal.      Left Ear: External ear normal.      Nose: Nose " normal.      Mouth/Throat:      Pharynx: No oropharyngeal exudate.   Eyes:      Pupils: Pupils are equal, round, and reactive to light.   Neck:      Thyroid: No thyromegaly.      Vascular: No JVD.      Trachea: No tracheal deviation.   Cardiovascular:      Rate and Rhythm: Normal rate and regular rhythm.      Heart sounds: No murmur heard.     No friction rub. No gallop.   Pulmonary:      Effort: No respiratory distress.      Breath sounds: Normal breath sounds. No wheezing or rales.   Chest:      Chest wall: No tenderness.   Abdominal:      General: Bowel sounds are normal. There is no distension.      Palpations: Abdomen is soft.      Tenderness: There is no abdominal tenderness.   Musculoskeletal:         General: No tenderness. Normal range of motion.   Lymphadenopathy:      Cervical: No cervical adenopathy.   Skin:     General: Skin is warm and dry.      Findings: No rash.   Neurological:      Mental Status: He is alert and oriented to person, place, and time.   Psychiatric:         Behavior: Behavior normal.         LABS     Lab Results   Component Value Date    HGBA1C 5.2 11/15/2023     CMP  Sodium   Date Value Ref Range Status   11/15/2023 139 136 - 145 mmol/L Final     Potassium   Date Value Ref Range Status   11/15/2023 3.3 (L) 3.5 - 5.1 mmol/L Final     Chloride   Date Value Ref Range Status   11/15/2023 97 95 - 110 mmol/L Final     CO2   Date Value Ref Range Status   11/15/2023 27 23 - 29 mmol/L Final     Glucose   Date Value Ref Range Status   11/15/2023 84 70 - 110 mg/dL Final     BUN   Date Value Ref Range Status   11/15/2023 16 6 - 20 mg/dL Final     Creatinine   Date Value Ref Range Status   11/15/2023 1.0 0.5 - 1.4 mg/dL Final     Calcium   Date Value Ref Range Status   11/15/2023 10.6 (H) 8.7 - 10.5 mg/dL Final     Total Protein   Date Value Ref Range Status   11/15/2023 8.7 (H) 6.0 - 8.4 g/dL Final     Albumin   Date Value Ref Range Status   11/15/2023 4.9 3.5 - 5.2 g/dL Final     Total Bilirubin    Date Value Ref Range Status   11/15/2023 1.4 (H) 0.1 - 1.0 mg/dL Final     Comment:     For infants and newborns, interpretation of results should be based  on gestational age, weight and in agreement with clinical  observations.    Premature Infant recommended reference ranges:  Up to 24 hours.............<8.0 mg/dL  Up to 48 hours............<12.0 mg/dL  3-5 days..................<15.0 mg/dL  6-29 days.................<15.0 mg/dL       Alkaline Phosphatase   Date Value Ref Range Status   11/15/2023 74 55 - 135 U/L Final     AST   Date Value Ref Range Status   11/15/2023 148 (H) 10 - 40 U/L Final     ALT   Date Value Ref Range Status   11/15/2023 296 (H) 10 - 44 U/L Final     Anion Gap   Date Value Ref Range Status   11/15/2023 15 8 - 16 mmol/L Final     eGFR if    Date Value Ref Range Status   04/18/2022 >60.0 >60 mL/min/1.73 m^2 Final     eGFR if non    Date Value Ref Range Status   04/18/2022 >60.0 >60 mL/min/1.73 m^2 Final     Comment:     Calculation used to obtain the estimated glomerular filtration  rate (eGFR) is the CKD-EPI equation.        Lab Results   Component Value Date    WBC 6.23 11/15/2023    HGB 17.2 11/15/2023    HCT 50.4 11/15/2023    MCV 91 11/15/2023     11/15/2023     Lab Results   Component Value Date    CHOL 225 (H) 11/15/2023    CHOL 167 02/02/2023    CHOL 274 (H) 10/24/2022     Lab Results   Component Value Date    HDL 38 (L) 11/15/2023    HDL 37 (L) 02/02/2023    HDL 40 10/24/2022     Lab Results   Component Value Date    LDLCALC 145.8 11/15/2023    LDLCALC 100.0 02/02/2023    LDLCALC 175.6 (H) 10/24/2022     Lab Results   Component Value Date    TRIG 206 (H) 11/15/2023    TRIG 150 02/02/2023    TRIG 292 (H) 10/24/2022     Lab Results   Component Value Date    CHOLHDL 16.9 (L) 11/15/2023    CHOLHDL 22.2 02/02/2023    CHOLHDL 14.6 (L) 10/24/2022     Lab Results   Component Value Date    TSH 1.556 11/15/2023       ASSESSMENT/PLAN     Kaden Heller is  a 38 y.o. male    Annual physical exam- All age and gender related screenings discussed     Migraine equivalent syndrome- will refer to neurology CT head.   -     Ambulatory referral/consult to Neurology; Future; Expected date: 11/23/2023  -     CT Head Without Contrast; Future; Expected date: 11/16/2023    GUSTABO (generalized anxiety disorder)- poorly controlled off meds. Will restart fluoxetine and monitor - VV in 6 weeks   -     FLUoxetine 10 MG capsule; Take 1 capsule (10 mg total) by mouth once daily.  Dispense: 30 capsule; Refill: 11  -     ALPRAZolam (XANAX) 0.25 MG tablet; Take 1 tablet (0.25 mg total) by mouth nightly as needed (panic attack).  Dispense: 30 tablet; Refill: 0    Meniere's disease of right ear- poorly controlled. Will f/u with ent     Mixed hyperlipidemia- stable. Will cont crestor. Reviewed lipids    Hypertension, unspecified type- stable. Will cont current meds.     Hypertriglyceridemia    Family history of cardiovascular disease    Sensorineural hearing loss (SNHL) of right ear with unrestricted hearing of left ear  -     Ambulatory referral/consult to Neurology; Future; Expected date: 11/23/2023  -     CT Head Without Contrast; Future; Expected date: 11/16/2023    Nonintractable headache, unspecified chronicity pattern, unspecified headache type  -     Ambulatory referral/consult to Neurology; Future; Expected date: 11/23/2023  -     CT Head Without Contrast; Future; Expected date: 11/16/2023    Elevated LFTs- refer to hepatology - ?cont maxide and crestor. Can he start PreP   -     Ambulatory referral/consult to Hepatology; Future; Expected date: 11/23/2023    Fatty liver  -     Ambulatory referral/consult to Hepatology; Future; Expected date: 11/23/2023    Hypercalcemia  -     Vitamin D; Future; Expected date: 11/16/2023  -     PTH, intact; Future; Expected date: 11/16/2023  -     Basic Metabolic Panel; Future; Expected date: 11/16/2023    Panic disorder  -     ALPRAZolam (XANAX) 0.25 MG  tablet; Take 1 tablet (0.25 mg total) by mouth nightly as needed (panic attack).  Dispense: 30 tablet; Refill: 0    High risk homosexual behavior  -     C. trachomatis/N. gonorrhoeae by AMP DNA  -     RPR; Future; Expected date: 11/16/2023  -     Hepatitis Panel, Acute; Future; Expected date: 11/16/2023  -     HIV 1/2 Ag/Ab (4th Gen); Future; Expected date: 11/16/2023    Encounter for HIV pre-exposure prophylaxis  -     C. trachomatis/N. gonorrhoeae by AMP DNA  -     RPR; Future; Expected date: 11/16/2023  -     Hepatitis Panel, Acute; Future; Expected date: 11/16/2023  -     HIV 1/2 Ag/Ab (4th Gen); Future; Expected date: 11/16/2023           Follow up with PCP     Lacey LOVE, FRANCIA, FNP-c   Department of Internal Medicine - [facility] [address]  11:49 AM

## 2023-11-17 ENCOUNTER — HOSPITAL ENCOUNTER (OUTPATIENT)
Dept: RADIOLOGY | Facility: HOSPITAL | Age: 38
Discharge: HOME | End: 2023-11-17
Attending: NURSE PRACTITIONER
Payer: COMMERCIAL

## 2023-11-17 DIAGNOSIS — G43.109 MIGRAINE EQUIVALENT SYNDROME: ICD-10-CM

## 2023-11-17 DIAGNOSIS — R51.9 NONINTRACTABLE HEADACHE, UNSPECIFIED CHRONICITY PATTERN, UNSPECIFIED HEADACHE TYPE: ICD-10-CM

## 2023-11-17 DIAGNOSIS — H90.41 SENSORINEURAL HEARING LOSS (SNHL) OF RIGHT EAR WITH UNRESTRICTED HEARING OF LEFT EAR: ICD-10-CM

## 2023-11-17 DIAGNOSIS — E55.9 VITAMIN D DEFICIENCY: Primary | ICD-10-CM

## 2023-11-17 LAB
C TRACH DNA SPEC QL NAA+PROBE: NOT DETECTED
N GONORRHOEA DNA SPEC QL NAA+PROBE: NOT DETECTED
RPR SER QL: NORMAL

## 2023-11-17 PROCEDURE — 70450 CT HEAD/BRAIN W/O DYE: CPT | Mod: 26,,, | Performed by: RADIOLOGY

## 2023-11-17 PROCEDURE — 70450 CT HEAD WITHOUT CONTRAST: ICD-10-PCS | Mod: 26,,, | Performed by: RADIOLOGY

## 2023-11-17 PROCEDURE — 70450 CT HEAD/BRAIN W/O DYE: CPT | Mod: TC

## 2023-11-17 RX ORDER — ERGOCALCIFEROL 1.25 MG/1
50000 CAPSULE ORAL
Qty: 12 CAPSULE | Refills: 0 | Status: SHIPPED | OUTPATIENT
Start: 2023-11-17 | End: 2024-02-29 | Stop reason: SDUPTHER

## 2023-11-21 ENCOUNTER — LAB VISIT (OUTPATIENT)
Dept: LAB | Facility: HOSPITAL | Age: 38
End: 2023-11-21
Payer: COMMERCIAL

## 2023-11-21 ENCOUNTER — OFFICE VISIT (OUTPATIENT)
Dept: HEPATOLOGY | Facility: CLINIC | Age: 38
End: 2023-11-21
Payer: COMMERCIAL

## 2023-11-21 VITALS — WEIGHT: 159.38 LBS | HEIGHT: 69 IN | BODY MASS INDEX: 23.6 KG/M2

## 2023-11-21 DIAGNOSIS — K76.0 FATTY LIVER: ICD-10-CM

## 2023-11-21 DIAGNOSIS — R79.89 ELEVATED LFTS: ICD-10-CM

## 2023-11-21 DIAGNOSIS — R79.89 ELEVATED LFTS: Primary | ICD-10-CM

## 2023-11-21 LAB
A1AT SERPL-MCNC: 143 MG/DL (ref 100–190)
ALBUMIN SERPL BCP-MCNC: 4.6 G/DL (ref 3.5–5.2)
ALP SERPL-CCNC: 75 U/L (ref 55–135)
ALT SERPL W/O P-5'-P-CCNC: 151 U/L (ref 10–44)
AST SERPL-CCNC: 95 U/L (ref 10–40)
BILIRUB DIRECT SERPL-MCNC: 0.3 MG/DL (ref 0.1–0.3)
BILIRUB SERPL-MCNC: 0.8 MG/DL (ref 0.1–1)
CERULOPLASMIN SERPL-MCNC: 27 MG/DL (ref 15–45)
CK SERPL-CCNC: 97 U/L (ref 20–200)
FERRITIN SERPL-MCNC: 1167 NG/ML (ref 20–300)
HAV IGG SER QL IA: NORMAL
HBV CORE AB SERPL QL IA: NORMAL
IGG SERPL-MCNC: 1103 MG/DL (ref 650–1600)
IRON SERPL-MCNC: 93 UG/DL (ref 45–160)
PROT SERPL-MCNC: 8.2 G/DL (ref 6–8.4)
SATURATED IRON: 22 % (ref 20–50)
TOTAL IRON BINDING CAPACITY: 426 UG/DL (ref 250–450)
TRANSFERRIN SERPL-MCNC: 288 MG/DL (ref 200–375)

## 2023-11-21 PROCEDURE — 1160F PR REVIEW ALL MEDS BY PRESCRIBER/CLIN PHARMACIST DOCUMENTED: ICD-10-PCS | Mod: CPTII,S$GLB,, | Performed by: NURSE PRACTITIONER

## 2023-11-21 PROCEDURE — 99999 PR PBB SHADOW E&M-EST. PATIENT-LVL III: ICD-10-PCS | Mod: PBBFAC,,, | Performed by: NURSE PRACTITIONER

## 2023-11-21 PROCEDURE — 3008F PR BODY MASS INDEX (BMI) DOCUMENTED: ICD-10-PCS | Mod: CPTII,S$GLB,, | Performed by: NURSE PRACTITIONER

## 2023-11-21 PROCEDURE — 1160F RVW MEDS BY RX/DR IN RCRD: CPT | Mod: CPTII,S$GLB,, | Performed by: NURSE PRACTITIONER

## 2023-11-21 PROCEDURE — 99999 PR PBB SHADOW E&M-EST. PATIENT-LVL III: CPT | Mod: PBBFAC,,, | Performed by: NURSE PRACTITIONER

## 2023-11-21 PROCEDURE — 36415 COLL VENOUS BLD VENIPUNCTURE: CPT | Performed by: NURSE PRACTITIONER

## 2023-11-21 PROCEDURE — 3044F HG A1C LEVEL LT 7.0%: CPT | Mod: CPTII,S$GLB,, | Performed by: NURSE PRACTITIONER

## 2023-11-21 PROCEDURE — 86704 HEP B CORE ANTIBODY TOTAL: CPT | Performed by: NURSE PRACTITIONER

## 2023-11-21 PROCEDURE — 1159F MED LIST DOCD IN RCRD: CPT | Mod: CPTII,S$GLB,, | Performed by: NURSE PRACTITIONER

## 2023-11-21 PROCEDURE — 82390 ASSAY OF CERULOPLASMIN: CPT | Performed by: NURSE PRACTITIONER

## 2023-11-21 PROCEDURE — 82550 ASSAY OF CK (CPK): CPT | Performed by: NURSE PRACTITIONER

## 2023-11-21 PROCEDURE — 1159F PR MEDICATION LIST DOCUMENTED IN MEDICAL RECORD: ICD-10-PCS | Mod: CPTII,S$GLB,, | Performed by: NURSE PRACTITIONER

## 2023-11-21 PROCEDURE — 84466 ASSAY OF TRANSFERRIN: CPT | Performed by: NURSE PRACTITIONER

## 2023-11-21 PROCEDURE — 99213 PR OFFICE/OUTPT VISIT, EST, LEVL III, 20-29 MIN: ICD-10-PCS | Mod: S$GLB,,, | Performed by: NURSE PRACTITIONER

## 2023-11-21 PROCEDURE — 99213 OFFICE O/P EST LOW 20 MIN: CPT | Mod: S$GLB,,, | Performed by: NURSE PRACTITIONER

## 2023-11-21 PROCEDURE — 86381 MITOCHONDRIAL ANTIBODY EACH: CPT | Performed by: NURSE PRACTITIONER

## 2023-11-21 PROCEDURE — 80321 ALCOHOLS BIOMARKERS 1OR 2: CPT | Performed by: NURSE PRACTITIONER

## 2023-11-21 PROCEDURE — 82728 ASSAY OF FERRITIN: CPT | Performed by: NURSE PRACTITIONER

## 2023-11-21 PROCEDURE — 80076 HEPATIC FUNCTION PANEL: CPT | Performed by: NURSE PRACTITIONER

## 2023-11-21 PROCEDURE — 86790 VIRUS ANTIBODY NOS: CPT | Performed by: NURSE PRACTITIONER

## 2023-11-21 PROCEDURE — 83540 ASSAY OF IRON: CPT | Performed by: NURSE PRACTITIONER

## 2023-11-21 PROCEDURE — 86015 ACTIN ANTIBODY EACH: CPT | Performed by: NURSE PRACTITIONER

## 2023-11-21 PROCEDURE — 86038 ANTINUCLEAR ANTIBODIES: CPT | Performed by: NURSE PRACTITIONER

## 2023-11-21 PROCEDURE — 3008F BODY MASS INDEX DOCD: CPT | Mod: CPTII,S$GLB,, | Performed by: NURSE PRACTITIONER

## 2023-11-21 PROCEDURE — 82103 ALPHA-1-ANTITRYPSIN TOTAL: CPT | Performed by: NURSE PRACTITIONER

## 2023-11-21 PROCEDURE — 82784 ASSAY IGA/IGD/IGG/IGM EACH: CPT | Performed by: NURSE PRACTITIONER

## 2023-11-21 PROCEDURE — 3044F PR MOST RECENT HEMOGLOBIN A1C LEVEL <7.0%: ICD-10-PCS | Mod: CPTII,S$GLB,, | Performed by: NURSE PRACTITIONER

## 2023-11-21 PROCEDURE — 86706 HEP B SURFACE ANTIBODY: CPT | Performed by: NURSE PRACTITIONER

## 2023-11-21 NOTE — PROGRESS NOTES
[facility] New Patient Visit    Reason for Visit:  Elevated liver enzymes    PCP: Gwendolyn Upton    HPI:  This is a 38 y.o. male with PMH noted below, here for evaluation of elevated liver enzymes.    The patient's risk factors for fatty liver disease include:     Obesity                                        No; BMI 23.54  Dyslipidemia                                Yes; Prescribed Crestor 10 mg daily - Refer to most recent lipid panel results   Latest Reference Range & Units 11/15/23 11:26   Cholesterol Total 120 - 199 mg/dL 225 (H)   HDL 40 - 75 mg/dL 38 (L)   HDL/Cholesterol Ratio 20.0 - 50.0 % 16.9 (L)   Non-HDL Cholesterol mg/dL 187   Total Cholesterol/HDL Ratio 2.0 - 5.0  5.9 (H)   Triglycerides 30 - 150 mg/dL 206 (H)   LDL Cholesterol 63.0 - 159.0 mg/dL 145.8     Insulin resistance/Diabetes         No; Last HgbA1c was 5.2% (11/2023)  Family history of diabetes           No    He has had elevated liver enzymes in a hepatocellular pattern since at least 4/2022. Prior LFT results in Care Everywhere were normal. Refer to LFT trend as below:     Latest Reference Range & Units 04/18/22 18:03 04/26/22 10:30 02/02/23 10:24 08/30/23 14:34 11/15/23 11:26   ALP 55 - 135 U/L 68 54 (L) 75 59 74   PROTEIN TOTAL 6.0 - 8.4 g/dL 8.6 (H) 7.4 7.9 7.5 8.7 (H)   Albumin 3.5 - 5.2 g/dL 4.8 4.3 4.5 4.3 4.9   BILIRUBIN TOTAL 0.1 - 1.0 mg/dL 1.0 0.6 0.8 0.8 1.4 (H)   Bilirubin Direct 0.1 - 0.3 mg/dL  0.3 0.3     AST 10 - 40 U/L 93 (H) 63 (H) 143 (H) 80 (H) 148 (H)   ALT 10 - 44 U/L 189 (H) 127 (H) 235 (H) 138 (H) 296 (H)     He has never undergone a liver biopsy or non-invasive staging exam.     FIB-4 Calculation: 1.35 at 11/21/2023  4:02 PM   Calculated from:  Last SGOT/AST : 148 at 11/21/2023  4:02 PM  Last SGPT/ALT: 296 at 11/21/2023  4:02 PM   Platelets: 229 at 11/21/2023  4:02 PM   Age: 38 y.o.     FIB-4 below 1.30 is considered as low-risk for advanced fibrosis  FIB-4 over 2.67 is considered as high-risk  for advanced fibrosis  FIB-4 values between 1.30 and 2.67 are considered as intermediate-risk of advanced fibrosis for ages 36-64.     For ages > 64 the cut-off for low-risk goes to < 2.  This is a screening tool and clinical judgement should be used in the interpretation of these results.    CT of the abdomen in 8/2023 (performed during ED visit) showed:    FINDINGS:    Images of the lower thorax are remarkable for mild dependent atelectasis.     The liver is hypoattenuating suggesting steatosis, correlation with LFTs recommended.  The spleen, pancreas, gallbladder and adrenal glands are unremarkable.  There is no biliary dilation or ascites.  The portal vein, splenic vein, SMV, celiac axis and SMA all are patent.  No significant abdominal lymphadenopathy.     The kidneys enhance symmetrically without hydronephrosis or nephrolithiasis.  There is a low attenuating lesion arising from the lower pole of the left kidney measuring 1.4 cm, attenuation of which suggests cyst.  The bilateral ureters are unremarkable without calculi seen.  The urinary bladder is unremarkable.  The prostate is not enlarged.     The distal large bowel is for the most part decompressed.  The terminal ileum is unremarkable.  There is surgical change of appendectomy.  The small bowel is grossly unremarkable.  There are few scattered shotty periaortic, pericaval, and mesenteric lymph nodes.  No focal organized pelvic fluid collection.     There are degenerative changes of the spine.  No significant inguinal lymphadenopathy.     Impression:     1. Findings suggesting hepatic steatosis, correlation with LFTs recommended.  2. No findings to suggest obstructive uropathy.  3. Please see above for additional findings.       He has no known family history of liver or autoimmune diseases. He denies any recent history of heavy alcohol use. He typically consumes on average 5 alcoholic drinks per week. He does not use illicit drugs. Viral hepatitis testing  was negative on recent labs. 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 He has been taking an OTC sleep aid that contains Valerian root within the past week. He is well appearing, and has no signs or symptoms of hepatic decompensation including jaundice, pruritus, abdominal distention, lower extremity edema, hematemesis, melena, or periods of confusion suggestive of hepatic encephalopathy.      PMHX:  has a past medical history of Fatty liver (11/16/2023), Food allergy (2015), GUSTABO (generalized anxiety disorder) (4/20/2022), Hearing loss (11/27/2020), Hypertension (11/8/2023), Meniere disease (12/2019), Mixed hyperlipidemia (02/18/2019), and Seasonal allergies.    PSHX:  has a past surgical history that includes Hip arthroscopy w/ labral repair; Appendectomy; and Hip surgery (3/3/2006).    The patient's social and family histories were reviewed by me and updated in the appropriate section of the electronic medical record.    Review of patient's allergies indicates:   Allergen Reactions    Hazelnut Itching, Other (See Comments) and Swelling    Bly Itching, Other (See Comments) and Swelling     Current Outpatient Medications on File Prior to Visit   Medication Sig Dispense Refill    ALPRAZolam (XANAX) 0.25 MG tablet Take 1 tablet (0.25 mg total) by mouth nightly as needed (panic attack). 30 tablet 0    ergocalciferol (ERGOCALCIFEROL) 50,000 unit Cap Take 1 capsule (50,000 Units total) by mouth every 7 days. 12 capsule 0    FLUoxetine 10 MG capsule Take 1 capsule (10 mg total) by mouth once daily. 30 capsule 11    loratadine (CLARITIN) 5 mg chewable tablet Take 5 mg by mouth once daily.      meclizine (ANTIVERT) 12.5 mg tablet Take 1 tablet (12.5 mg total) by mouth 2 (two) times daily as needed for Dizziness. 30 tablet 11    mometasone (NASONEX) 50 mcg/actuation nasal spray Use 2 sprays by Nasal route once daily. 17 g 11    montelukast (SINGULAIR) 10 mg tablet Take 1  "tablet by mouth every evening. 30 tablet 11    rosuvastatin (CRESTOR) 10 MG tablet Take 1 tablet (10 mg total) by mouth once daily. 90 tablet 3    triamterene-hydrochlorothiazide 37.5-25 mg (DYAZIDE) 37.5-25 mg per capsule Take 1 capsule by mouth every morning. 30 capsule 11    ondansetron (ZOFRAN-ODT) 4 MG TbDL Take 1 tablet (4 mg total) by mouth every 6 (six) hours as needed (nausea). (Patient not taking: Reported on 11/8/2023) 10 tablet 0    promethazine (PHENERGAN) 25 MG tablet Take 1 tablet (25 mg total) by mouth every 6 (six) hours as needed for Nausea. (Patient not taking: Reported on 11/8/2023) 20 tablet 0     No current facility-administered medications on file prior to visit.     SOCIAL HISTORY:   Social History     Tobacco Use   Smoking Status Never   Smokeless Tobacco Never     Social History     Substance and Sexual Activity   Alcohol Use Yes    Alcohol/week: 3.0 standard drinks of alcohol    Types: 2 Cans of beer, 1 Shots of liquor per week    Comment: occas     Social History     Substance and Sexual Activity   Drug Use Never     ROS:   GENERAL: Denies fever, chills, weight loss/gain, fatigue  HEENT: Denies headaches, dizziness, vision/hearing changes  CARDIOVASCULAR: Denies chest pain, palpitations, or edema  RESPIRATORY: Denies dyspnea, cough  GI: Denies abdominal pain, rectal bleeding, nausea, vomiting. No change in bowel pattern or color  : Denies dysuria, hematuria + Dark urine  SKIN: Denies rash, itching   NEURO: Denies confusion, memory loss, or mood changes  PSYCH: Denies depression or anxiety  HEME/LYMPH: Denies easy bruising or bleeding    Objective Findings:    PHYSICAL EXAM:   Friendly White male, in no acute distress; alert and oriented to person, place and time.  VITALS: Ht 5' 9" (1.753 m)   Wt 72.3 kg (159 lb 6.3 oz)   BMI 23.54 kg/m²   HENT: Normocephalic, without obvious abnormality.   EYES: Sclerae anicteric.    NECK: No obvious masses.  CARDIOVASCULAR: No peripheral " edema.  RESPIRATORY: Normal respiratory effort.   GI: Non-distended abdomen.  EXTREMITIES:  No clubbing, cyanosis or edema.  SKIN: Warm and dry. No jaundice. No rashes noted to exposed skin.  NEURO:  Normal gait. No asterixis.  PSYCH:  Memory intact. Thought and speech pattern appropriate. Behavior normal. No depression or anxiety noted.    DIAGNOSTIC STUDIES:    CT Abdomen Pelvis With Contrast 8/30/2023:    FINDINGS:    Images of the lower thorax are remarkable for mild dependent atelectasis.     The liver is hypoattenuating suggesting steatosis, correlation with LFTs recommended.  The spleen, pancreas, gallbladder and adrenal glands are unremarkable.  There is no biliary dilation or ascites.  The portal vein, splenic vein, SMV, celiac axis and SMA all are patent.  No significant abdominal lymphadenopathy.     The kidneys enhance symmetrically without hydronephrosis or nephrolithiasis.  There is a low attenuating lesion arising from the lower pole of the left kidney measuring 1.4 cm, attenuation of which suggests cyst.  The bilateral ureters are unremarkable without calculi seen.  The urinary bladder is unremarkable.  The prostate is not enlarged.     The distal large bowel is for the most part decompressed.  The terminal ileum is unremarkable.  There is surgical change of appendectomy.  The small bowel is grossly unremarkable.  There are few scattered shotty periaortic, pericaval, and mesenteric lymph nodes.  No focal organized pelvic fluid collection.     There are degenerative changes of the spine.  No significant inguinal lymphadenopathy.     Impression:     1. Findings suggesting hepatic steatosis, correlation with LFTs recommended.  2. No findings to suggest obstructive uropathy.  3. Please see above for additional findings.     ASSESSMENT & PLAN:  38 y.o. White male with:    1. Fatty liver  Ambulatory referral/consult to Hepatology    Hepatic Function Panel    OSMANY Screen w/Reflex    Antimitochondrial  Antibody    Anti-Smooth Muscle Antibody    IgG    Alpha-1-Antitrypsin    Ferritin    Phosphatidylethanol (PETH)    Iron and TIBC    CK    Ceruloplasmin    Hepatitis A antibody, IgG    Hepatitis B Core Antibody, Total    Hepatitis B Surface Antibody, Qual/Quant      2. Elevated LFTs  Ambulatory referral/consult to Hepatology    Hepatic Function Panel    OSMANY Screen w/Reflex    Antimitochondrial Antibody    Anti-Smooth Muscle Antibody    IgG    Alpha-1-Antitrypsin    Ferritin    Phosphatidylethanol (PETH)    Iron and TIBC    CK    Ceruloplasmin    Hepatitis A antibody, IgG    Hepatitis B Core Antibody, Total    Hepatitis B Surface Antibody, Qual/Quant        - Repeat liver function tests now. Will also screen for autoimmune and genetic diseases that can affect the liver.   - Check titer levels for Hepatitis A and B, and arrange for prophylactic vaccination if needed.  - If serological work up is unrevealing and LFT's remain elevated, will obtain liver biopsy for further evaluation.  - Avoid alcohol and herbal supplements/alternative remedies.  - Maintain a healthy weight, through diet and exercise.  - Recommend good control of cholesterol, blood pressure, & blood sugar levels.  - Avoid alcohol and herbal supplements/alternative remedies.  - Return to clinic to be determined by lab results.    Thank you for allowing me to participate in the care of Kaden Heller       Hepatology Nurse Practitioner  [facility]    CC'ed note to:   Lacey Han,*  Gwendolyn Upton MD

## 2023-11-24 LAB
ANA SER QL IF: NORMAL
MITOCHONDRIA AB TITR SER IF: NORMAL {TITER}

## 2023-11-27 LAB
CLINICAL BIOCHEMIST REVIEW: NORMAL
HBV SURFACE AB SER QL IA: POSITIVE
HBV SURFACE AB SERPL IA-ACNC: 779 MIU/ML
PLPETH BLD-MCNC: NORMAL NG/ML
POPETH BLD-MCNC: 194 NG/ML
SMOOTH MUSCLE AB TITR SER IF: NORMAL {TITER}

## 2023-11-28 DIAGNOSIS — K76.0 FATTY LIVER: ICD-10-CM

## 2023-11-28 DIAGNOSIS — R79.89 ELEVATED FERRITIN: ICD-10-CM

## 2023-11-28 DIAGNOSIS — Z78.9 ALCOHOL USE: ICD-10-CM

## 2023-11-28 DIAGNOSIS — R79.89 ELEVATED LFTS: Primary | ICD-10-CM

## 2023-11-29 ENCOUNTER — TELEPHONE (OUTPATIENT)
Dept: HEPATOLOGY | Facility: CLINIC | Age: 38
End: 2023-11-29
Payer: COMMERCIAL

## 2023-11-29 NOTE — TELEPHONE ENCOUNTER
----- Message from Domonique Walter NP sent at 11/28/2023  9:27 AM CST -----  Please arrange repeat labs in 1 month. Orders in Epic. Thanks!

## 2023-12-21 ENCOUNTER — TELEPHONE (OUTPATIENT)
Dept: PODIATRY | Facility: CLINIC | Age: 38
End: 2023-12-21
Payer: COMMERCIAL

## 2023-12-21 NOTE — TELEPHONE ENCOUNTER
Spoke with patient and rescheduled him on 2/22/24 with Dr. Nickerson due to incorrectly scheduled. Also added him to a wait list per patients request. Verbalized understanding and no further issues discussed.

## 2023-12-27 ENCOUNTER — LAB VISIT (OUTPATIENT)
Dept: LAB | Facility: HOSPITAL | Age: 38
End: 2023-12-27
Payer: COMMERCIAL

## 2023-12-27 DIAGNOSIS — R79.89 ELEVATED FERRITIN: ICD-10-CM

## 2023-12-27 DIAGNOSIS — Z78.9 ALCOHOL USE: ICD-10-CM

## 2023-12-27 DIAGNOSIS — R79.89 ELEVATED LFTS: ICD-10-CM

## 2023-12-27 DIAGNOSIS — K76.0 FATTY LIVER: ICD-10-CM

## 2023-12-27 LAB
ALBUMIN SERPL BCP-MCNC: 4.5 G/DL (ref 3.5–5.2)
ALP SERPL-CCNC: 60 U/L (ref 55–135)
ALT SERPL W/O P-5'-P-CCNC: 39 U/L (ref 10–44)
AST SERPL-CCNC: 29 U/L (ref 10–40)
BILIRUB DIRECT SERPL-MCNC: 0.2 MG/DL (ref 0.1–0.3)
BILIRUB SERPL-MCNC: 0.5 MG/DL (ref 0.1–1)
FERRITIN SERPL-MCNC: 527 NG/ML (ref 20–300)
PROT SERPL-MCNC: 7.7 G/DL (ref 6–8.4)

## 2023-12-27 PROCEDURE — 80076 HEPATIC FUNCTION PANEL: CPT | Performed by: NURSE PRACTITIONER

## 2023-12-27 PROCEDURE — 80321 ALCOHOLS BIOMARKERS 1OR 2: CPT | Performed by: NURSE PRACTITIONER

## 2023-12-27 PROCEDURE — 82728 ASSAY OF FERRITIN: CPT | Performed by: NURSE PRACTITIONER

## 2024-01-01 DIAGNOSIS — K76.0 FATTY LIVER: Primary | ICD-10-CM

## 2024-01-01 DIAGNOSIS — Z78.9 ALCOHOL USE: ICD-10-CM

## 2024-01-01 DIAGNOSIS — R79.89 ELEVATED FERRITIN: ICD-10-CM

## 2024-01-01 DIAGNOSIS — R79.89 ELEVATED LFTS: ICD-10-CM

## 2024-01-01 LAB
CLINICAL BIOCHEMIST REVIEW: NORMAL
PLPETH BLD-MCNC: <10 NG/ML
POPETH BLD-MCNC: <10 NG/ML

## 2024-01-02 ENCOUNTER — TELEPHONE (OUTPATIENT)
Dept: HEPATOLOGY | Facility: CLINIC | Age: 39
End: 2024-01-02
Payer: COMMERCIAL

## 2024-01-02 NOTE — TELEPHONE ENCOUNTER
----- Message from Domonique Walter NP sent at 1/1/2024  8:00 PM CST -----  Please contact patient to arrange a f/u visit with me in 6 months with labs drawn prior to the visit. Thanks!

## 2024-01-23 ENCOUNTER — OFFICE VISIT (OUTPATIENT)
Dept: URGENT CARE | Facility: CLINIC | Age: 39
End: 2024-01-23
Payer: COMMERCIAL

## 2024-01-23 VITALS
WEIGHT: 159 LBS | TEMPERATURE: 100 F | HEIGHT: 69 IN | OXYGEN SATURATION: 98 % | HEART RATE: 76 BPM | BODY MASS INDEX: 23.55 KG/M2 | SYSTOLIC BLOOD PRESSURE: 136 MMHG | DIASTOLIC BLOOD PRESSURE: 96 MMHG | RESPIRATION RATE: 14 BRPM

## 2024-01-23 DIAGNOSIS — R50.9 FEVER, UNSPECIFIED FEVER CAUSE: ICD-10-CM

## 2024-01-23 DIAGNOSIS — J10.1 INFLUENZA DUE TO INFLUENZA VIRUS, TYPE B: Primary | ICD-10-CM

## 2024-01-23 DIAGNOSIS — R05.9 COUGH, UNSPECIFIED TYPE: ICD-10-CM

## 2024-01-23 DIAGNOSIS — R51.9 ACUTE NONINTRACTABLE HEADACHE, UNSPECIFIED HEADACHE TYPE: ICD-10-CM

## 2024-01-23 DIAGNOSIS — R09.81 NASAL CONGESTION: ICD-10-CM

## 2024-01-23 DIAGNOSIS — H81.01 MENIERE'S DISEASE OF RIGHT EAR: Primary | ICD-10-CM

## 2024-01-23 DIAGNOSIS — J31.0 RHINITIS, UNSPECIFIED TYPE: ICD-10-CM

## 2024-01-23 LAB
CTP QC/QA: YES
POC MOLECULAR INFLUENZA A AGN: NEGATIVE
POC MOLECULAR INFLUENZA B AGN: POSITIVE

## 2024-01-23 PROCEDURE — 99213 OFFICE O/P EST LOW 20 MIN: CPT | Mod: S$GLB,,, | Performed by: NURSE PRACTITIONER

## 2024-01-23 PROCEDURE — 87502 INFLUENZA DNA AMP PROBE: CPT | Mod: QW,S$GLB,, | Performed by: NURSE PRACTITIONER

## 2024-01-23 RX ORDER — BENZONATATE 100 MG/1
100 CAPSULE ORAL 3 TIMES DAILY PRN
Qty: 30 CAPSULE | Refills: 0 | Status: SHIPPED | OUTPATIENT
Start: 2024-01-23 | End: 2024-02-02

## 2024-01-23 RX ORDER — ROSUVASTATIN CALCIUM 10 MG/1
10 TABLET, COATED ORAL DAILY
Qty: 90 TABLET | Refills: 3 | Status: SHIPPED | OUTPATIENT
Start: 2024-01-23 | End: 2025-01-22

## 2024-01-23 RX ORDER — OSELTAMIVIR PHOSPHATE 75 MG/1
75 CAPSULE ORAL 2 TIMES DAILY
Qty: 10 CAPSULE | Refills: 0 | Status: SHIPPED | OUTPATIENT
Start: 2024-01-23 | End: 2024-01-30 | Stop reason: ALTCHOICE

## 2024-01-23 RX ORDER — MONTELUKAST SODIUM 10 MG/1
TABLET ORAL
Qty: 30 TABLET | Refills: 1 | Status: SHIPPED | OUTPATIENT
Start: 2024-01-23

## 2024-01-23 NOTE — PATIENT INSTRUCTIONS
Please drink plenty of fluids.  Please get plenty of rest.  Please return here or go to the Emergency Department for any concerns or worsening of condition.  Tamiflu prescription has been discussed and if prescribed, please take to completion unless you cannot tolerate the side effects.   It is ok to take over the counter plain Allegra or Claritin or Zyrtec.   If you do have Hypertension or palpitations, it is safe to take Coricidin HBP for relief of sinus symptoms.  We recommend you take Flonase (Fluticasone) or another nasally inhaled steroid unless you are already taking one.  Nasal irrigation with a saline spray or Netti Pot like device per their directions is also recommended.  If not allergic, please take over the counter Tylenol (Acetaminophen) and/or Motrin (Ibuprofen) as directed for control of pain and/or fever.  Please follow up with your primary care doctor or specialist as needed.    If you  smoke, please stop smoking.

## 2024-01-23 NOTE — PROGRESS NOTES
"Subjective:      Patient ID: Kaden Heller is a 38 y.o. male.    Vitals:  height is 5' 9" (1.753 m) and weight is 72.1 kg (159 lb). His oral temperature is 100.3 °F (37.9 °C). His blood pressure is 136/96 (abnormal) and his pulse is 76. His respiration is 14 and oxygen saturation is 98%.     Chief Complaint: Sore Throat (Horribly sore throat. Coughing. Occasional sneezing/stuffy nose. Hot to cold. Just miserable. Has been happening 2 days, but was stuck out of town. - Entered by patient)    38 y.o male c/o sore throat and cough x 3days ago. Patient also reports that he been having a headache and nasal congestion with post nasal drip. Patient reports that he has chest tightness. Body sweats and chills     38 year old male presents to clinic with complaints of cough, watery nasal secretions, fatigue, body ache, fever, chills, headache x 3 days started after attending a crowded ,     Sore Throat   This is a new problem. Episode onset: 3 days ago. The problem has been gradually worsening. Neither side of throat is experiencing more pain than the other. The maximum temperature recorded prior to his arrival was 100.4 - 100.9 F. The pain is at a severity of 7/10. The pain is moderate. Associated symptoms include congestion, coughing, ear pain, headaches, a plugged ear sensation and shortness of breath. Pertinent negatives include no abdominal pain, diarrhea, drooling, ear discharge, hoarse voice, neck pain, stridor, swollen glands, trouble swallowing or vomiting. He has tried nothing for the symptoms. The treatment provided no relief.       Constitution: Positive for chills, fatigue and fever. Negative for sweating.   HENT:  Positive for ear pain, congestion, postnasal drip and sore throat. Negative for ear discharge, drooling and trouble swallowing.    Neck: Negative for neck pain.   Respiratory:  Positive for cough and shortness of breath. Negative for stridor.    Gastrointestinal:  Negative for abdominal pain, " vomiting and diarrhea.   Musculoskeletal:  Positive for muscle ache.   Skin:  Negative for erythema.   Allergic/Immunologic: Positive for sneezing.   Neurological:  Positive for headaches.      Objective:     Physical Exam   Constitutional: He is oriented to person, place, and time. He appears well-developed.   HENT:   Head: Normocephalic and atraumatic. Head is without abrasion, without contusion and without laceration.   Ears:   Right Ear: Tympanic membrane, external ear and ear canal normal.   Left Ear: Tympanic membrane, external ear and ear canal normal.   Nose: Mucosal edema and rhinorrhea present. Right sinus exhibits no maxillary sinus tenderness and no frontal sinus tenderness. Left sinus exhibits no maxillary sinus tenderness and no frontal sinus tenderness.   Mouth/Throat: Oropharynx is clear and moist and mucous membranes are normal.   Eyes: EOM and lids are normal. Extraocular movement intact   Neck: Trachea normal and phonation normal. Neck supple.   Cardiovascular: Normal rate, regular rhythm and normal heart sounds.   Pulmonary/Chest: Effort normal and breath sounds normal. No stridor. No respiratory distress.   Musculoskeletal: Normal range of motion.         General: Normal range of motion.   Neurological: He is alert and oriented to person, place, and time.   Skin: Skin is warm, dry, intact and no rash. Capillary refill takes less than 2 seconds. No abrasion, No burn, No bruising, No erythema and No ecchymosis   Psychiatric: His speech is normal and behavior is normal. Judgment and thought content normal.   Nursing note and vitals reviewed.      Assessment:     1. Influenza due to influenza virus, type B    2. Cough, unspecified type    3. Rhinitis, unspecified type    4. Nasal congestion    5. Acute nonintractable headache, unspecified headache type    6. Fever, unspecified fever cause      Results for orders placed or performed in visit on 01/23/24   POCT Influenza A/B MOLECULAR   Result Value  Ref Range    POC Molecular Influenza A Ag Negative Negative, Not Reported    POC Molecular Influenza B Ag Positive (A) Negative, Not Reported     Acceptable Yes       Plan:       Influenza due to influenza virus, type B  -     oseltamivir (TAMIFLU) 75 MG capsule; Take 1 capsule (75 mg total) by mouth 2 (two) times daily. for 5 days  Dispense: 10 capsule; Refill: 0    Cough, unspecified type  -     POCT Influenza A/B MOLECULAR  -     benzonatate (TESSALON) 100 MG capsule; Take 1 capsule (100 mg total) by mouth 3 (three) times daily as needed for Cough.  Dispense: 30 capsule; Refill: 0    Rhinitis, unspecified type    Nasal congestion    Acute nonintractable headache, unspecified headache type    Fever, unspecified fever cause      Patient Instructions   Please drink plenty of fluids.  Please get plenty of rest.  Please return here or go to the Emergency Department for any concerns or worsening of condition.  Tamiflu prescription has been discussed and if prescribed, please take to completion unless you cannot tolerate the side effects.   It is ok to take over the counter plain Allegra or Claritin or Zyrtec.   If you do have Hypertension or palpitations, it is safe to take Coricidin HBP for relief of sinus symptoms.  We recommend you take Flonase (Fluticasone) or another nasally inhaled steroid unless you are already taking one.  Nasal irrigation with a saline spray or Netti Pot like device per their directions is also recommended.  If not allergic, please take over the counter Tylenol (Acetaminophen) and/or Motrin (Ibuprofen) as directed for control of pain and/or fever.  Please follow up with your primary care doctor or specialist as needed.    If you  smoke, please stop smoking.

## 2024-01-26 ENCOUNTER — OFFICE VISIT (OUTPATIENT)
Dept: INTERNAL MEDICINE | Facility: CLINIC | Age: 39
End: 2024-01-26
Payer: COMMERCIAL

## 2024-01-26 DIAGNOSIS — K76.0 FATTY LIVER: ICD-10-CM

## 2024-01-26 DIAGNOSIS — E78.2 MIXED HYPERLIPIDEMIA: ICD-10-CM

## 2024-01-26 DIAGNOSIS — F41.1 GAD (GENERALIZED ANXIETY DISORDER): Primary | ICD-10-CM

## 2024-01-26 DIAGNOSIS — E83.52 HYPERCALCEMIA: ICD-10-CM

## 2024-01-26 DIAGNOSIS — Z29.81 ENCOUNTER FOR HIV PRE-EXPOSURE PROPHYLAXIS: ICD-10-CM

## 2024-01-26 PROCEDURE — 99214 OFFICE O/P EST MOD 30 MIN: CPT | Mod: 95,,, | Performed by: NURSE PRACTITIONER

## 2024-01-26 RX ORDER — FLUOXETINE HYDROCHLORIDE 20 MG/1
20 CAPSULE ORAL DAILY
Qty: 30 CAPSULE | Refills: 11 | Status: SHIPPED | OUTPATIENT
Start: 2024-01-26 | End: 2025-01-25

## 2024-01-26 RX ORDER — FLUOXETINE HYDROCHLORIDE 20 MG/1
20 CAPSULE ORAL DAILY
Qty: 30 CAPSULE | Refills: 11 | Status: SHIPPED | OUTPATIENT
Start: 2024-01-26 | End: 2024-01-26 | Stop reason: SDUPTHER

## 2024-01-26 NOTE — PROGRESS NOTES
INTERNAL MEDICINE PROGRESS NOTE    CHIEF COMPLAINT     Chief Complaint   Patient presents with    Follow-up       HPI     Kaden Heller is a 38 y.o. male who presents for a follow up visit today.    The patient location is: home ,[place]   The chief complaint leading to consultation is: follow up     Visit type: audiovisual    Face to Face time with patient: 20 minutes of total time spent on the encounter, which includes face to face time and non-face to face time preparing to see the patient (eg, review of tests), Obtaining and/or reviewing separately obtained history, Documenting clinical information in the electronic or other health record, Independently interpreting results (not separately reported) and communicating results to the patient/family/caregiver, or Care coordination (not separately reported).         Each patient to whom he or she provides medical services by telemedicine is:  (1) informed of the relationship between the physician and patient and the respective role of any other health care provider with respect to management of the patient; and (2) notified that he or she may decline to receive medical services by telemedicine and may withdraw from such care at any time.    Notes:      GUSTABO - taking fluoxetine 10mg - not controlled but no adverse effects. Using xanax three time per week. Willing amira increase dose to 20mg     Elevated LFTs/NAFLD- reviewed labs from hepatology. LFTs improved after 6 weeks of no alcohol and no supplements.     HLD_ taking crestor     Still would like to initiate Prep - now that liver enzymes are normal will send for labs and refer to Masood     Past Medical History:  Past Medical History:   Diagnosis Date    Fatty liver 11/16/2023    Food allergy 2015    micheal and hazelnut    GUSTABO (generalized anxiety disorder) 4/20/2022    Hearing loss 11/27/2020    same time tinnitus started 24/7    Hypertension 11/8/2023    Meniere disease 12/2019    Mixed hyperlipidemia 02/18/2019     Seasonal allergies        Home Medications:  Prior to Admission medications    Medication Sig Start Date End Date Taking? Authorizing Provider   ALPRAZolam (XANAX) 0.25 MG tablet Take 1 tablet (0.25 mg total) by mouth nightly as needed (panic attack). 11/16/23 12/16/23  Lacey Han NP   benzonatate (TESSALON) 100 MG capsule Take 1 capsule (100 mg total) by mouth 3 (three) times daily as needed for Cough. 1/23/24 2/2/24  Evelyne Gonzalez NP   ergocalciferol (ERGOCALCIFEROL) 50,000 unit Cap Take 1 capsule (50,000 Units total) by mouth every 7 days. 11/17/23   Lacey Han NP   FLUoxetine 10 MG capsule Take 1 capsule (10 mg total) by mouth once daily. 11/16/23 11/15/24  Lacey Han NP   loratadine (CLARITIN) 5 mg chewable tablet Take 5 mg by mouth once daily.    Provider, Historical   meclizine (ANTIVERT) 12.5 mg tablet Take 1 tablet (12.5 mg total) by mouth 2 (two) times daily as needed for Dizziness. 10/31/23   Tristen Louis PA-C   mometasone (NASONEX) 50 mcg/actuation nasal spray Use 2 sprays by Nasal route once daily. 7/12/22   AGUSTIN Saravia MD   montelukast (SINGULAIR) 10 mg tablet Take 1 tablet by mouth every evening. 1/23/24   AGUSTIN Saravia MD   oseltamivir (TAMIFLU) 75 MG capsule Take 1 capsule (75 mg total) by mouth 2 (two) times daily. for 5 days 1/23/24 1/28/24  Evelyne Gonzalez NP   rosuvastatin (CRESTOR) 10 MG tablet Take 1 tablet (10 mg total) by mouth once daily. 1/23/24 1/22/25  Agustina Wells MD   triamterene-hydrochlorothiazide 37.5-25 mg (DYAZIDE) 37.5-25 mg per capsule Take 1 capsule by mouth every morning. 10/31/23 10/30/24  Worrel, Tristen J., PA-C       Review of Systems:  Review of Systems   Constitutional:  Negative for activity change and unexpected weight change.   HENT:  Positive for hearing loss and rhinorrhea. Negative for trouble swallowing.    Eyes:  Negative for discharge and visual disturbance.   Respiratory:  Positive for chest  tightness. Negative for wheezing.    Cardiovascular:  Positive for palpitations. Negative for chest pain.   Gastrointestinal:  Negative for blood in stool, constipation, diarrhea and vomiting.   Endocrine: Negative for polydipsia and polyuria.   Genitourinary:  Negative for difficulty urinating, hematuria and urgency.   Musculoskeletal:  Negative for arthralgias, joint swelling and neck pain.   Neurological:  Positive for weakness and headaches.   Psychiatric/Behavioral:  Negative for confusion and dysphoric mood.        Health Maintainence:   Immunizations:  Health Maintenance         Date Due Completion Date    Pneumococcal Vaccines (Age 0-64) (1 of 2 - PCV) Never done ---    TETANUS VACCINE Never done ---    COVID-19 Vaccine (4 - 2023-24 season) 09/01/2023 10/14/2021    Lipid Panel 11/15/2028 11/15/2023             PHYSICAL EXAM     There were no vitals taken for this visit.    Physical Exam  Constitutional:       Appearance: He is ill-appearing (has the flu).   Pulmonary:      Effort: No respiratory distress.   Neurological:      Mental Status: He is alert and oriented to person, place, and time.         LABS     Lab Results   Component Value Date    HGBA1C 5.2 11/15/2023     CMP  Sodium   Date Value Ref Range Status   11/16/2023 139 136 - 145 mmol/L Final     Potassium   Date Value Ref Range Status   11/16/2023 3.7 3.5 - 5.1 mmol/L Final     Chloride   Date Value Ref Range Status   11/16/2023 97 95 - 110 mmol/L Final     CO2   Date Value Ref Range Status   11/16/2023 28 23 - 29 mmol/L Final     Glucose   Date Value Ref Range Status   11/16/2023 91 70 - 110 mg/dL Final     BUN   Date Value Ref Range Status   11/16/2023 14 6 - 20 mg/dL Final     Creatinine   Date Value Ref Range Status   11/16/2023 1.0 0.5 - 1.4 mg/dL Final     Calcium   Date Value Ref Range Status   11/16/2023 10.6 (H) 8.7 - 10.5 mg/dL Final     Total Protein   Date Value Ref Range Status   12/27/2023 7.7 6.0 - 8.4 g/dL Final     Albumin   Date  Value Ref Range Status   12/27/2023 4.5 3.5 - 5.2 g/dL Final     Total Bilirubin   Date Value Ref Range Status   12/27/2023 0.5 0.1 - 1.0 mg/dL Final     Comment:     For infants and newborns, interpretation of results should be based  on gestational age, weight and in agreement with clinical  observations.    Premature Infant recommended reference ranges:  Up to 24 hours.............<8.0 mg/dL  Up to 48 hours............<12.0 mg/dL  3-5 days..................<15.0 mg/dL  6-29 days.................<15.0 mg/dL       Alkaline Phosphatase   Date Value Ref Range Status   12/27/2023 60 55 - 135 U/L Final     AST   Date Value Ref Range Status   12/27/2023 29 10 - 40 U/L Final     ALT   Date Value Ref Range Status   12/27/2023 39 10 - 44 U/L Final     Anion Gap   Date Value Ref Range Status   11/16/2023 14 8 - 16 mmol/L Final     eGFR if    Date Value Ref Range Status   04/18/2022 >60.0 >60 mL/min/1.73 m^2 Final     eGFR if non    Date Value Ref Range Status   04/18/2022 >60.0 >60 mL/min/1.73 m^2 Final     Comment:     Calculation used to obtain the estimated glomerular filtration  rate (eGFR) is the CKD-EPI equation.        Lab Results   Component Value Date    WBC 6.23 11/15/2023    HGB 17.2 11/15/2023    HCT 50.4 11/15/2023    MCV 91 11/15/2023     11/15/2023     Lab Results   Component Value Date    CHOL 225 (H) 11/15/2023    CHOL 167 02/02/2023    CHOL 274 (H) 10/24/2022     Lab Results   Component Value Date    HDL 38 (L) 11/15/2023    HDL 37 (L) 02/02/2023    HDL 40 10/24/2022     Lab Results   Component Value Date    LDLCALC 145.8 11/15/2023    LDLCALC 100.0 02/02/2023    LDLCALC 175.6 (H) 10/24/2022     Lab Results   Component Value Date    TRIG 206 (H) 11/15/2023    TRIG 150 02/02/2023    TRIG 292 (H) 10/24/2022     Lab Results   Component Value Date    CHOLHDL 16.9 (L) 11/15/2023    CHOLHDL 22.2 02/02/2023    CHOLHDL 14.6 (L) 10/24/2022     Lab Results   Component Value  Date    TSH 1.556 11/15/2023       ASSESSMENT/PLAN     Kaden Heller is a 38 y.o. male     GUSTABO (generalized anxiety disorder)- will increase prozac to 20mg and monitor   -     Discontinue: FLUoxetine 20 MG capsule; Take 1 capsule (20 mg total) by mouth once daily.  Dispense: 30 capsule; Refill: 11  -     CBC Auto Differential; Future; Expected date: 01/26/2024  -     Comprehensive Metabolic Panel; Future; Expected date: 01/26/2024  -     Lipid Panel; Future; Expected date: 01/26/2024  -     FLUoxetine 20 MG capsule; Take 1 capsule (20 mg total) by mouth once daily.  Dispense: 30 capsule; Refill: 11    Mixed hyperlipidemia- stable. Cont crestor and repeat FLP   -     CBC Auto Differential; Future; Expected date: 01/26/2024  -     Comprehensive Metabolic Panel; Future; Expected date: 01/26/2024  -     Lipid Panel; Future; Expected date: 01/26/2024    Fatty liver- LFTs improved, will cont abstinence from etoh and repeat hepatic panel   -     CBC Auto Differential; Future; Expected date: 01/26/2024  -     Comprehensive Metabolic Panel; Future; Expected date: 01/26/2024  -     Lipid Panel; Future; Expected date: 01/26/2024    Hypercalcemia- repeat Ca level, will consider changing BP meds if still elevated   -     CBC Auto Differential; Future; Expected date: 01/26/2024  -     Comprehensive Metabolic Panel; Future; Expected date: 01/26/2024  -     Lipid Panel; Future; Expected date: 01/26/2024    Encounter for HIV pre-exposure prophylaxis  -     C. trachomatis/N. gonorrhoeae by AMP DNA; Future; Expected date: 01/26/2024  -     CBC Auto Differential; Future; Expected date: 01/26/2024  -     Comprehensive Metabolic Panel; Future; Expected date: 01/26/2024  -     Lipid Panel; Future; Expected date: 01/26/2024  -     RPR; Future; Expected date: 01/26/2024  -     HIV 1/2 Ag/Ab (4th Gen); Future; Expected date: 01/26/2024  -     Hepatitis Panel, Acute; Future; Expected date: 01/26/2024           Follow up with PCP     Patient  education provided from [facility]. Patient was counseled on when and how to seek emergent care.       Lacey LOVE, FRANCIA, FNP-c   Department of Internal Medicine - [facility]  7:39 AM

## 2024-01-30 ENCOUNTER — OFFICE VISIT (OUTPATIENT)
Dept: OTOLARYNGOLOGY | Facility: CLINIC | Age: 39
End: 2024-01-30
Payer: COMMERCIAL

## 2024-01-30 VITALS
HEIGHT: 69 IN | SYSTOLIC BLOOD PRESSURE: 138 MMHG | WEIGHT: 156.63 LBS | DIASTOLIC BLOOD PRESSURE: 99 MMHG | HEART RATE: 81 BPM | BODY MASS INDEX: 23.2 KG/M2

## 2024-01-30 DIAGNOSIS — H93.8X3 PRESSURE SENSATION IN BOTH EARS: ICD-10-CM

## 2024-01-30 DIAGNOSIS — J34.2 NASAL SEPTAL DEVIATION: ICD-10-CM

## 2024-01-30 DIAGNOSIS — H81.01 MENIERE'S DISEASE OF RIGHT EAR: ICD-10-CM

## 2024-01-30 DIAGNOSIS — H69.93 DYSFUNCTION OF BOTH EUSTACHIAN TUBES: ICD-10-CM

## 2024-01-30 DIAGNOSIS — H93.13 TINNITUS OF BOTH EARS: ICD-10-CM

## 2024-01-30 DIAGNOSIS — J30.9 ALLERGIC RHINITIS, UNSPECIFIED SEASONALITY, UNSPECIFIED TRIGGER: ICD-10-CM

## 2024-01-30 DIAGNOSIS — H90.41 SENSORINEURAL HEARING LOSS (SNHL) OF RIGHT EAR WITH UNRESTRICTED HEARING OF LEFT EAR: Primary | ICD-10-CM

## 2024-01-30 PROCEDURE — 3008F BODY MASS INDEX DOCD: CPT | Mod: CPTII,S$GLB,, | Performed by: SPECIALIST

## 2024-01-30 PROCEDURE — 99999 PR PBB SHADOW E&M-EST. PATIENT-LVL IV: CPT | Mod: PBBFAC,,, | Performed by: SPECIALIST

## 2024-01-30 PROCEDURE — 1159F MED LIST DOCD IN RCRD: CPT | Mod: CPTII,S$GLB,, | Performed by: SPECIALIST

## 2024-01-30 PROCEDURE — 1160F RVW MEDS BY RX/DR IN RCRD: CPT | Mod: CPTII,S$GLB,, | Performed by: SPECIALIST

## 2024-01-30 PROCEDURE — 3080F DIAST BP >= 90 MM HG: CPT | Mod: CPTII,S$GLB,, | Performed by: SPECIALIST

## 2024-01-30 PROCEDURE — 3075F SYST BP GE 130 - 139MM HG: CPT | Mod: CPTII,S$GLB,, | Performed by: SPECIALIST

## 2024-01-30 PROCEDURE — 99214 OFFICE O/P EST MOD 30 MIN: CPT | Mod: S$GLB,,, | Performed by: SPECIALIST

## 2024-01-30 NOTE — PROGRESS NOTES
Subjective:       Patient ID: Kaden Heller is a 38 y.o. male.    Chief Complaint: Tinnitus (Pressure, swelling, ringing in both ears, recovering from flu. Has not had a hearing test done recently)      The patient is returning for follow-up visit.  It has been 14 months since I last saw him.  There are issues to discuss:  1. Hearing loss right ear/Meniere's disease right:  The patient has been observing a low-sodium diet and attempting to decrease his intake of caffeine.  He has noticed increase in tinnitus and pressure in his right ear intermittently.  On September 12, 2023 he started noticing some intermittent pressure and tinnitus in his left ear, the unaffected ear.  He has pressure to some degree in the left ear on an everyday basis.  He has tinnitus in the ear 2-3 times per week and decreased hearing in the ear that fluctuates on an off all day long.  He has also been having unsteadiness for the last 2 months.  The patient is taking Dyazide on a daily basis.  2. Tinnitus:  He is now developed ringing and pressure in his left ear.  The ringing pressure in both ears fluctuate.  He is also having fluctuation of the hearing in both ears.  3. Allergic rhinitis:  He is taking loratadine in the morning, montelukast at night and using mometasone nasal spray once daily.  He is having clear nasal discharge nasal symptoms have been stable.  4. Headaches:  When he tries to go caffeine free he develops headaches.  He is weaning himself off of caffeine but has not yet been able to stop it.          Review of Systems     Constitutional: Positive for fatigue.  Negative for appetite change, chills, fever and unexpected weight loss.      HENT: Positive for ear pain, hearing loss, postnasal drip, ringing in the ears, runny nose, sinus pressure, sore throat and stuffy nose.  Negative for ear discharge, ear infection, facial swelling, mouth sores, nosebleeds, sinus infection, tonsil infection, dental problems, trouble swallowing  and voice change.      Eyes:  Negative for change in eyesight, eye drainage, eye itching and photophobia.     Respiratory:  Positive for cough. Negative for shortness of breath, sleep apnea, snoring and wheezing.      Cardiovascular:  Positive for chest pain. Negative for foot swelling, irregular heartbeat and swollen veins.     Gastrointestinal:  Negative for abdominal pain, acid reflux, constipation, diarrhea, heartburn and vomiting.     Genitourinary: Negative for difficulty urinating, sexual problems and frequent urination.     Musc: Negative for aching joints, aching muscles, back pain and neck pain.     Skin: Negative for rash.     Allergy: Positive for seasonal allergies. Negative for food allergies.     Endocrine: Negative for cold intolerance and heat intolerance.      Neurological: Positive for dizziness, headaches and light-headedness. Negative for seizures and tremors.      Hematologic: Negative for bruises/bleeds easily and swollen glands.      Psychiatric: Positive for depression and nervous/anxious. Negative for decreased concentration and sleep disturbance.            See serial audiograms below:                          Objective:      Physical Exam  Vitals and nursing note reviewed.   Constitutional:       General: He is awake.      Appearance: Normal appearance. He is well-developed, well-groomed and normal weight.   HENT:      Head: Normocephalic.      Jaw: There is normal jaw occlusion.      Salivary Glands: Right salivary gland is not diffusely enlarged or tender. Left salivary gland is not diffusely enlarged or tender.      Right Ear: Ear canal and external ear normal. Decreased hearing noted. Tympanic membrane is retracted.      Left Ear: Hearing, ear canal and external ear normal. Tympanic membrane is retracted.      Nose: Septal deviation (to the right), mucosal edema (cyanotic, boggy inferior turbinates bilaterally) and rhinorrhea (clear mucus bilaterally) present. No nasal deformity.  Rhinorrhea is clear.      Right Turbinates: Enlarged and pale.      Left Turbinates: Enlarged and pale.      Mouth/Throat:      Lips: No lesions.      Mouth: Mucous membranes are moist. No oral lesions.      Dentition: No gum lesions.      Tongue: No lesions.      Palate: No mass and lesions.      Pharynx: Oropharynx is clear. Uvula midline.      Tonsils: 2+ on the right. 2+ on the left.   Eyes:      General: Lids are normal.         Right eye: No discharge.         Left eye: No discharge.      Conjunctiva/sclera:      Right eye: Right conjunctiva is injected. No exudate.     Left eye: Left conjunctiva is injected. No exudate.     Pupils: Pupils are equal, round, and reactive to light.   Neck:      Thyroid: No thyroid mass or thyromegaly.      Vascular: No carotid bruit.      Trachea: Trachea normal. No tracheal deviation.   Cardiovascular:      Rate and Rhythm: Normal rate and regular rhythm.      Pulses: Normal pulses.      Heart sounds: Normal heart sounds.   Pulmonary:      Effort: Pulmonary effort is normal.      Breath sounds: Normal breath sounds. No stridor. No decreased breath sounds, wheezing, rhonchi or rales.   Abdominal:      General: Bowel sounds are normal.      Palpations: Abdomen is soft.      Tenderness: There is no abdominal tenderness.   Musculoskeletal:         General: Normal range of motion.      Cervical back: Normal range of motion and neck supple. No muscular tenderness.   Lymphadenopathy:      Head:      Right side of head: No submental, submandibular, preauricular, posterior auricular or occipital adenopathy.      Left side of head: No submental, submandibular, preauricular, posterior auricular or occipital adenopathy.      Cervical: No cervical adenopathy.   Skin:     General: Skin is warm and dry.      Findings: No petechiae or rash.      Nails: There is no clubbing.   Neurological:      Mental Status: He is alert and oriented to person, place, and time.      Cranial Nerves: No cranial  nerve deficit.      Sensory: No sensory deficit.      Gait: Gait normal.      Comments: Neuro otologic-no nystagmus, normal gait, Romberg negative, tandem Romberg falls to the left   Psychiatric:         Speech: Speech normal.         Behavior: Behavior normal. Behavior is cooperative.         Thought Content: Thought content normal.         Judgment: Judgment normal.         Assessment:       1. Sensorineural hearing loss (SNHL) of right ear with unrestricted hearing of left ear    2. Meniere's disease of right ear    3. Pressure sensation in both ears    4. Tinnitus of both ears    5. Allergic rhinitis, unspecified seasonality, unspecified trigger    6. Dysfunction of both eustachian tubes    7. Nasal septal deviation        Plan:        I will schedule patient for a comprehensive auditory evaluation.  He is to continue with his low-sodium no nicotine no caffeine diet.  He is to take a 2nd dose of Dyazide on any days which he notices an increase in fullness, pressure, tinnitus or hearing loss in his ears.  He will use meclizine on an as-needed basis for imbalance or vertigo.  I will recheck him in 3 months.  We did discuss referral to the otologist.  He would like to wait to see how he does by improving his diet.            DISCLAIMER: This note was prepared with [employer] voice recognition transcription software. Garbled syntax, mangled pronouns, and other bizarre constructions may be attributed to that software system. While efforts were made to correct any mistakes made by this voice recognition program, some errors and/or omissions may remain in the note that were missed when the note was originally created.

## 2024-02-08 ENCOUNTER — PATIENT MESSAGE (OUTPATIENT)
Dept: OTOLARYNGOLOGY | Facility: CLINIC | Age: 39
End: 2024-02-08
Payer: COMMERCIAL

## 2024-02-09 ENCOUNTER — LAB VISIT (OUTPATIENT)
Dept: LAB | Facility: HOSPITAL | Age: 39
End: 2024-02-09
Attending: NURSE PRACTITIONER
Payer: COMMERCIAL

## 2024-02-09 DIAGNOSIS — K76.0 FATTY LIVER: ICD-10-CM

## 2024-02-09 DIAGNOSIS — E83.52 HYPERCALCEMIA: ICD-10-CM

## 2024-02-09 DIAGNOSIS — F41.1 GAD (GENERALIZED ANXIETY DISORDER): ICD-10-CM

## 2024-02-09 DIAGNOSIS — Z29.81 ENCOUNTER FOR HIV PRE-EXPOSURE PROPHYLAXIS: ICD-10-CM

## 2024-02-09 DIAGNOSIS — E78.2 MIXED HYPERLIPIDEMIA: ICD-10-CM

## 2024-02-09 LAB
ALBUMIN SERPL BCP-MCNC: 4.4 G/DL (ref 3.5–5.2)
ALP SERPL-CCNC: 57 U/L (ref 55–135)
ALT SERPL W/O P-5'-P-CCNC: 73 U/L (ref 10–44)
ANION GAP SERPL CALC-SCNC: 10 MMOL/L (ref 8–16)
AST SERPL-CCNC: 44 U/L (ref 10–40)
BASOPHILS # BLD AUTO: 0.03 K/UL (ref 0–0.2)
BASOPHILS NFR BLD: 0.5 % (ref 0–1.9)
BILIRUB SERPL-MCNC: 0.7 MG/DL (ref 0.1–1)
BUN SERPL-MCNC: 10 MG/DL (ref 6–20)
CALCIUM SERPL-MCNC: 9.9 MG/DL (ref 8.7–10.5)
CHLORIDE SERPL-SCNC: 100 MMOL/L (ref 95–110)
CHOLEST SERPL-MCNC: 159 MG/DL (ref 120–199)
CHOLEST/HDLC SERPL: 4.4 {RATIO} (ref 2–5)
CO2 SERPL-SCNC: 29 MMOL/L (ref 23–29)
CREAT SERPL-MCNC: 0.9 MG/DL (ref 0.5–1.4)
DIFFERENTIAL METHOD BLD: ABNORMAL
EOSINOPHIL # BLD AUTO: 0.1 K/UL (ref 0–0.5)
EOSINOPHIL NFR BLD: 0.8 % (ref 0–8)
ERYTHROCYTE [DISTWIDTH] IN BLOOD BY AUTOMATED COUNT: 12.1 % (ref 11.5–14.5)
EST. GFR  (NO RACE VARIABLE): >60 ML/MIN/1.73 M^2
GLUCOSE SERPL-MCNC: 84 MG/DL (ref 70–110)
HAV IGM SERPL QL IA: NORMAL
HBV CORE IGM SERPL QL IA: NORMAL
HBV SURFACE AG SERPL QL IA: NORMAL
HCT VFR BLD AUTO: 46 % (ref 40–54)
HCV AB SERPL QL IA: NORMAL
HDLC SERPL-MCNC: 36 MG/DL (ref 40–75)
HDLC SERPL: 22.6 % (ref 20–50)
HGB BLD-MCNC: 15.5 G/DL (ref 14–18)
HIV 1+2 AB+HIV1 P24 AG SERPL QL IA: NORMAL
IMM GRANULOCYTES # BLD AUTO: 0.01 K/UL (ref 0–0.04)
IMM GRANULOCYTES NFR BLD AUTO: 0.2 % (ref 0–0.5)
LDLC SERPL CALC-MCNC: 102.2 MG/DL (ref 63–159)
LYMPHOCYTES # BLD AUTO: 2 K/UL (ref 1–4.8)
LYMPHOCYTES NFR BLD: 33.7 % (ref 18–48)
MCH RBC QN AUTO: 31.4 PG (ref 27–31)
MCHC RBC AUTO-ENTMCNC: 33.7 G/DL (ref 32–36)
MCV RBC AUTO: 93 FL (ref 82–98)
MONOCYTES # BLD AUTO: 0.5 K/UL (ref 0.3–1)
MONOCYTES NFR BLD: 8.5 % (ref 4–15)
NEUTROPHILS # BLD AUTO: 3.3 K/UL (ref 1.8–7.7)
NEUTROPHILS NFR BLD: 56.3 % (ref 38–73)
NONHDLC SERPL-MCNC: 123 MG/DL
NRBC BLD-RTO: 0 /100 WBC
PLATELET # BLD AUTO: 251 K/UL (ref 150–450)
PMV BLD AUTO: 11.4 FL (ref 9.2–12.9)
POTASSIUM SERPL-SCNC: 3.7 MMOL/L (ref 3.5–5.1)
PROT SERPL-MCNC: 7.8 G/DL (ref 6–8.4)
RBC # BLD AUTO: 4.93 M/UL (ref 4.6–6.2)
SODIUM SERPL-SCNC: 139 MMOL/L (ref 136–145)
TRIGL SERPL-MCNC: 104 MG/DL (ref 30–150)
WBC # BLD AUTO: 5.91 K/UL (ref 3.9–12.7)

## 2024-02-09 PROCEDURE — 80061 LIPID PANEL: CPT | Performed by: NURSE PRACTITIONER

## 2024-02-09 PROCEDURE — 86592 SYPHILIS TEST NON-TREP QUAL: CPT | Performed by: NURSE PRACTITIONER

## 2024-02-09 PROCEDURE — 80074 ACUTE HEPATITIS PANEL: CPT | Performed by: NURSE PRACTITIONER

## 2024-02-09 PROCEDURE — 80053 COMPREHEN METABOLIC PANEL: CPT | Performed by: NURSE PRACTITIONER

## 2024-02-09 PROCEDURE — 87389 HIV-1 AG W/HIV-1&-2 AB AG IA: CPT | Performed by: NURSE PRACTITIONER

## 2024-02-09 PROCEDURE — 85025 COMPLETE CBC W/AUTO DIFF WBC: CPT | Performed by: NURSE PRACTITIONER

## 2024-02-09 PROCEDURE — 36415 COLL VENOUS BLD VENIPUNCTURE: CPT | Performed by: NURSE PRACTITIONER

## 2024-02-10 LAB — RPR SER QL: NORMAL

## 2024-02-14 ENCOUNTER — PATIENT MESSAGE (OUTPATIENT)
Dept: INTERNAL MEDICINE | Facility: CLINIC | Age: 39
End: 2024-02-14
Payer: COMMERCIAL

## 2024-02-15 ENCOUNTER — OFFICE VISIT (OUTPATIENT)
Dept: INTERNAL MEDICINE | Facility: CLINIC | Age: 39
End: 2024-02-15
Payer: COMMERCIAL

## 2024-02-15 VITALS
BODY MASS INDEX: 22.83 KG/M2 | WEIGHT: 154.13 LBS | SYSTOLIC BLOOD PRESSURE: 124 MMHG | OXYGEN SATURATION: 97 % | HEART RATE: 85 BPM | DIASTOLIC BLOOD PRESSURE: 90 MMHG | HEIGHT: 69 IN

## 2024-02-15 DIAGNOSIS — Z72.52 HIGH RISK HOMOSEXUAL BEHAVIOR: Primary | ICD-10-CM

## 2024-02-15 PROCEDURE — 3008F BODY MASS INDEX DOCD: CPT | Mod: CPTII,S$GLB,, | Performed by: NURSE PRACTITIONER

## 2024-02-15 PROCEDURE — 1159F MED LIST DOCD IN RCRD: CPT | Mod: CPTII,S$GLB,, | Performed by: NURSE PRACTITIONER

## 2024-02-15 PROCEDURE — 87591 N.GONORRHOEAE DNA AMP PROB: CPT | Mod: 59 | Performed by: NURSE PRACTITIONER

## 2024-02-15 PROCEDURE — 99213 OFFICE O/P EST LOW 20 MIN: CPT | Mod: S$GLB,,, | Performed by: NURSE PRACTITIONER

## 2024-02-15 PROCEDURE — 99999 PR PBB SHADOW E&M-EST. PATIENT-LVL III: CPT | Mod: PBBFAC,,, | Performed by: NURSE PRACTITIONER

## 2024-02-15 PROCEDURE — 87491 CHLMYD TRACH DNA AMP PROBE: CPT | Mod: 59 | Performed by: NURSE PRACTITIONER

## 2024-02-15 PROCEDURE — 3080F DIAST BP >= 90 MM HG: CPT | Mod: CPTII,S$GLB,, | Performed by: NURSE PRACTITIONER

## 2024-02-15 PROCEDURE — 3074F SYST BP LT 130 MM HG: CPT | Mod: CPTII,S$GLB,, | Performed by: NURSE PRACTITIONER

## 2024-02-15 PROCEDURE — 1160F RVW MEDS BY RX/DR IN RCRD: CPT | Mod: CPTII,S$GLB,, | Performed by: NURSE PRACTITIONER

## 2024-02-15 RX ORDER — EMTRICITABINE AND TENOFOVIR DISOPROXIL FUMARATE 200; 300 MG/1; MG/1
1 TABLET, FILM COATED ORAL DAILY
Qty: 90 TABLET | Refills: 0 | Status: ACTIVE | OUTPATIENT
Start: 2024-02-15 | End: 2024-05-30

## 2024-02-15 NOTE — PROGRESS NOTES
Subjective     Patient ID: Kaden Heller is a 38 y.o. male.    Chief Complaint: prep    Presenting for PrEP consultation. Has had two male partners in the past 6 months; inconsistent condom use. No history of STI's. Wanting to start PrEP for HIV prophylaxis. Recently had labs done on 2/9/24; hiv, rpr, and hep panel negative. Has fatty liver disease; enzymes slightly elevated but improving. Has no complaints today.         Review of Systems   All other systems reviewed and are negative.         Objective     Physical Exam  Constitutional:       Appearance: Normal appearance.   HENT:      Head: Normocephalic and atraumatic.   Cardiovascular:      Rate and Rhythm: Normal rate and regular rhythm.   Pulmonary:      Effort: Pulmonary effort is normal.      Breath sounds: Normal breath sounds.   Skin:     General: Skin is warm and dry.   Neurological:      General: No focal deficit present.      Mental Status: He is alert and oriented to person, place, and time.   Psychiatric:         Mood and Affect: Mood normal.         Behavior: Behavior normal.          Assessment and Plan     1. High risk homosexual behavior; Patient is a good candidate for PrEP in order to reduce their risk of HIV transmission. Had recent negative HIV and hep panel on 2/9/24; recent enough to send in prescription for truvada. Screening GC/CT urine/oral/rectal performed today.     Plan  - Safe sex counseling.  - Discussed risks vs benefits of PrEP, per current CDC guidelines.  - Advised that PrEP does not reduce the risk of transmission of other STI's.  - Labs: HIV, RPR, GC/CT (urine, throat, rectal), serum creatinine q 3 months. Obtain Hepatitis C screening on initiation and then yearly. Obtain Hep A and Hep B screening on initiation.   - emtricitabine-tenofovir 200-300 mg (TRUVADA) 200-300 mg Tab; Take 1 tablet by mouth once daily.  Dispense: 90 tablet; Refill: 0  - Monitor liver enzymes closely     Masood Washington NP   Internal Medicine             Follow up in about 3 months (around 5/15/2024) for PrEP.

## 2024-02-15 NOTE — Clinical Note
Thank you for sending this lexis gentleman my way. I will continue to see him every three months for PrEP.   Masood

## 2024-02-16 LAB
C TRACH DNA SPEC QL NAA+PROBE: NORMAL
N GONORRHOEA DNA SPEC QL NAA+PROBE: NORMAL

## 2024-02-18 LAB
C TRACH RRNA SPEC QL NAA+PROBE: NEGATIVE
C TRACH RRNA SPEC QL NAA+PROBE: NEGATIVE
N GONORRHOEA RRNA SPEC QL NAA+PROBE: NEGATIVE
N GONORRHOEA RRNA SPEC QL NAA+PROBE: NEGATIVE
N.GONORROHEAE, AMP RNA SOURCE: NORMAL
N.GONORROHEAE, AMP RNA SOURCE: NORMAL
SPECIMEN SOURCE: NORMAL
SPECIMEN SOURCE: NORMAL

## 2024-02-19 PROBLEM — Z72.52 HIGH RISK HOMOSEXUAL BEHAVIOR: Status: ACTIVE | Noted: 2024-02-19

## 2024-02-23 ENCOUNTER — CLINICAL SUPPORT (OUTPATIENT)
Dept: AUDIOLOGY | Facility: CLINIC | Age: 39
End: 2024-02-23
Payer: COMMERCIAL

## 2024-02-23 DIAGNOSIS — H90.41 SENSORINEURAL HEARING LOSS (SNHL) OF RIGHT EAR WITH UNRESTRICTED HEARING OF LEFT EAR: Primary | ICD-10-CM

## 2024-02-23 PROCEDURE — 92557 COMPREHENSIVE HEARING TEST: CPT | Mod: S$GLB,,, | Performed by: AUDIOLOGIST

## 2024-02-23 PROCEDURE — 99999 PR PBB SHADOW E&M-EST. PATIENT-LVL I: CPT | Mod: PBBFAC,,, | Performed by: AUDIOLOGIST

## 2024-02-23 PROCEDURE — 92567 TYMPANOMETRY: CPT | Mod: S$GLB,,, | Performed by: AUDIOLOGIST

## 2024-02-23 NOTE — PROGRESS NOTES
Audiologic Evaluation 2/23/2024:       Kaden Heller, a 38 y.o. male, was seen today in the clinic for an audiologic evaluation.  Mr. Heller reported bilateral aural fullness and pressure that is worse in the right ear. He noted constant, right-sided ringing tinnitus. He has experienced brief instances of left-sided tinnitus over the past few months. Mr. Heller denied otalgia.     Tympanometry revealed Type A tympanogram in the right ear and Type A tympanogram in the left ear. Audiogram results revealed mild to moderate sensorineural hearing loss in the right ear and normal hearing sensitivity in the left ear.  Speech reception thresholds were noted at 35 dB in the right ear and 10 dB in the left ear.  Speech discrimination scores were 88% in the right ear and 100% in the left ear.    Recommendations:  Otologic evaluation  Hearing aid consultation for left ear with medical clearance  Annual audiogram  Hearing protection when in noise

## 2024-02-28 ENCOUNTER — PATIENT MESSAGE (OUTPATIENT)
Dept: INTERNAL MEDICINE | Facility: CLINIC | Age: 39
End: 2024-02-28
Payer: COMMERCIAL

## 2024-02-28 ENCOUNTER — PATIENT MESSAGE (OUTPATIENT)
Dept: NEUROLOGY | Facility: CLINIC | Age: 39
End: 2024-02-28
Payer: COMMERCIAL

## 2024-02-28 DIAGNOSIS — H81.01 MENIERE'S DISEASE OF RIGHT EAR: ICD-10-CM

## 2024-02-28 DIAGNOSIS — H90.41 SENSORINEURAL HEARING LOSS (SNHL) OF RIGHT EAR WITH UNRESTRICTED HEARING OF LEFT EAR: ICD-10-CM

## 2024-02-28 DIAGNOSIS — E55.9 VITAMIN D DEFICIENCY: ICD-10-CM

## 2024-02-28 DIAGNOSIS — H81.4 VERTIGO OF CENTRAL ORIGIN: ICD-10-CM

## 2024-02-29 ENCOUNTER — PATIENT MESSAGE (OUTPATIENT)
Dept: NEUROLOGY | Facility: CLINIC | Age: 39
End: 2024-02-29
Payer: COMMERCIAL

## 2024-02-29 RX ORDER — ERGOCALCIFEROL 1.25 MG/1
50000 CAPSULE ORAL
Qty: 12 CAPSULE | Refills: 3 | Status: SHIPPED | OUTPATIENT
Start: 2024-02-29

## 2024-03-01 RX ORDER — PREDNISONE 20 MG/1
60 TABLET ORAL DAILY
Qty: 12 TABLET | Refills: 0 | Status: SHIPPED | OUTPATIENT
Start: 2024-03-01 | End: 2024-03-05

## 2024-03-12 ENCOUNTER — CLINICAL SUPPORT (OUTPATIENT)
Dept: REHABILITATION | Facility: OTHER | Age: 39
End: 2024-03-12
Attending: STUDENT IN AN ORGANIZED HEALTH CARE EDUCATION/TRAINING PROGRAM
Payer: COMMERCIAL

## 2024-03-12 DIAGNOSIS — H90.41 SENSORINEURAL HEARING LOSS (SNHL) OF RIGHT EAR WITH UNRESTRICTED HEARING OF LEFT EAR: ICD-10-CM

## 2024-03-12 DIAGNOSIS — H81.01 MENIERE'S DISEASE OF RIGHT EAR: ICD-10-CM

## 2024-03-12 DIAGNOSIS — H81.4 VERTIGO OF CENTRAL ORIGIN: ICD-10-CM

## 2024-03-12 PROCEDURE — 97161 PT EVAL LOW COMPLEX 20 MIN: CPT | Mod: PN | Performed by: PHYSICAL THERAPIST

## 2024-03-12 PROCEDURE — 97112 NEUROMUSCULAR REEDUCATION: CPT | Mod: PN | Performed by: PHYSICAL THERAPIST

## 2024-03-12 NOTE — PATIENT INSTRUCTIONS
Resource: [employer]       Resource: [employer]       Juan C String Convergence Saccades    Tie one end of the string on a door knob and hold the other end at tip of your nose.  Position the 3 beads at the end, the middle, and near nose.  Adjust the near bead so that it is as close to your nose as possible (goal of 4 inches) while being able to see the bead clearly.  Focus on one bead then switch focus to another bead.  Keep jumping your focus from bead to bead in any pattern.  While focusing on a bead you should notice an illusion of 2 strings crossing with the intersection at the bead.      Perform for 2 min, 3-4x per day.

## 2024-03-12 NOTE — PROGRESS NOTES
[facility]   Physical Therapy Vestibular Initial Evaluation      Name: Kaden Heller  Clinic Number: [id number]    Therapy Diagnosis:   Encounter Diagnoses   Name Primary?    Sensorineural hearing loss (SNHL) of right ear with unrestricted hearing of left ear     Vertigo of central origin     Meniere's disease of right ear         Physician: Shruti Nickerson MD    Physician Orders: PT Eval and Treat   Medical Diagnosis from Referral:   H90.41 (ICD-10-CM) - Sensorineural hearing loss (SNHL) of right ear with unrestricted hearing of left ear   H81.4 (ICD-10-CM) - Vertigo of central origin   H81.01 (ICD-10-CM) - Meniere's disease of right ear     Evaluation Date: 3/12/2024  Authorization Period Expiration: 02/01/2025  Plan of Care Expiration: 04/30/2024  Progress Note Due: 04/12/2024  Visit # / Visits authorized: Eval/ 0   FOTO: 50/100    Precautions: Standard     Time In: 0901  Time Out: 1028  Total Appointment Time (timed & untimed codes): 87 minutes    Subjective     Date of onset: 5 years ago had bad episode of vertigo while at work; diagnosed with Meniere's; 2 years ago felt pressure and CHAZ and then diagnosed with Meniere's and followed up with audiologist and ENT; constant ringing in the ears since sept 2023; reports a closed head trauma about 12 years ago hit head and saw stars for about 1-2 mins disoriented, did not get medical care; found that sodium can exacerbate and small caffeine helps; notes that crowds and busy situations are okay; grocery stores can cause symptoms    History of current condition - Kaden reports: quick head movements brings on symptoms; and eyes feel out of wack; last time taken antivert/MCZ about 1 month ago: takes prn    Falls: 1-2 episodes of LOB during     Imaging: Brain MRI planned    Prior Therapy: not for vertigo; but post ortho sx  Social History:  lives alone  Occupation: research coordinator; computer and interactions with pts  Prior Level of  Function: mod I  Current Level of Function: mod I    History of Current Symptoms   Triggers: stress; increased sodium or caffeine; mid morning to mid afternoon can be the most aggravating times of day   Alleviating Factors: sit and focus on one thing that's not moving; hydrating   Description of symptoms: HA or vertigo; lightheadedness(sensation of spinning vs lightheadedness)   Frequency: daily in episodes   Duration:1-2 hours   Positional changes: yes; bending forward; rolling in bed;   Limitations due to symptoms:slower at work; fluorescent lights     History of migraines: none    Patients goals: manage symptoms     Medical History:   Past Medical History:   Diagnosis Date    Fatty liver 11/16/2023    Food allergy 2015    micheal and hazelnut    GUSTABO (generalized anxiety disorder) 4/20/2022    Hearing loss 11/27/2020    same time tinnitus started 24/7    Hypertension 11/8/2023    Meniere disease 12/2019    Mixed hyperlipidemia 02/18/2019    Seasonal allergies        Surgical History:   Kaden Heller  has a past surgical history that includes Hip arthroscopy w/ labral repair; Appendectomy; and Hip surgery (3/3/2006).    Medications:   Kaden has a current medication list which includes the following prescription(s): alprazolam, emtricitabine-tenofovir 200-300 mg, ergocalciferol, famotidine, fluoxetine, loratadine, meclizine, mometasone, montelukast, rosuvastatin, and triamterene-hydrochlorothiazide 37.5-25 mg.    Allergies:   Review of patient's allergies indicates:   Allergen Reactions    Hazelnut Itching, Other (See Comments) and Swelling    Micheal Itching, Other (See Comments) and Swelling            Objective   - Follows commands: 100% of time   - Speech: no deficits     Mental status: alert, oriented to person, place, and time, normal mood, behavior, speech, dress, motor activity, and thought processes, affect appropriate to mood  Appearance: Casually dressed and Well groomed  Behavior:  calm, cooperative, and  adequate rapport can be established  Attention Span and Concentration:  Normal      Sensation: Light Touch: Intact          Proprioception: Intact, Kinesthesia Intact         Visual/Auditory: denies changes   Tracking/Smooth Pursuits:Intact  Saccades: Impaired: mild overshooting to right  Acuity:Intact  R/L discrimination: Intact  Visual field: Intact  Convergence: >7cm  VOR: Impaired: lost focus on target bilaterally; in supine position noted uncoordinated eye movements without pt reported symptoms    Coordination:   - fine motor: WFL  - LE coordination:  WFL    ROM:     CERVICAL SPINE  Flexion 90 degrees (80-90 deg)  Extension 80 degrees (70-80 deg)  L side bend 45 degrees, R side bend 45 degrees (20-45 degrees)  L rotation 80 degrees, R rotation 80 degrees (70-90 degrees)  Are concurrent symptoms present with any of these movements: no    Modified VAS (Vertebral Artery Screen), in sitting (rotation, then extension):  R: negative  L: negative    UPPER EXTREMITY--AROM/PROM  (R) UE: WFLs  (L) UE: WFLs         Flexibility: limited keri HS flexibility    [place] SENSORY TESTING: TBA    Functional Gait Assessment: TBA    POSITIONAL CANAL TESTING  Looking for nystagmus (slow phase followed by quick phase to the affected side for BPPV)    [place] (posterior / CL anterior)   Right : Negative; pt reported that he felt the room shift twice    Left: negative; pt reported that eyes felt hyper focused and loss focus  Horizontal Canals   Right: negative   Left: negative  Treatment Performed: Not indicated at this time      Functional Mobility (Bed mobility, transfers)  Bed mobility: I  Supine to sit: I  Sit to supine: I  Sit to stand:  I    ADL's:  Feeding: I  Grooming: I  Hygiene: I  UB Dressing: I  LB Dressing: I  Toileting: I  Bathing: I      FOTO      Intake Outcome Measure for FOTO  Survey    Therapist reviewed FOTO scores for Kaden Heller on 3/12/2024.   FOTO documents entered into EPIC - see Media section.    Intake  Score: 50%         Treatment     Total Treatment time (time-based codes) separate from Evaluation: 20 minutes     Kaden received the treatments listed below:      therapeutic exercises to develop strength, endurance, ROM, flexibility, posture, and core stabilization for 00 minutes including:    neuromuscular re-education activities to improve: Balance, Coordination, Kinesthetic, Sense, Proprioception, and Posture for 25 minutes. The following activities were included:  +seated VOR horiz and vertical  +seated Smooth pursuits  +seated convergence    therapeutic activities to improve functional performance for 00  minutes, including:    gait training to improve functional mobility and safety for 00  minutes, including:    Patient Education and Home Exercises     Education provided:   - Vestibular rehabilitation  -HEP    Written Home Exercises Provided: Patient instructed to cont prior HEP. Exercises were reviewed and Kaden was able to demonstrate them prior to the end of the session.  Kaden demonstrated good  understanding of the education provided. See EMR under Patient Instructions for exercises provided during therapy sessions.    Assessment     Kaden is a 38 y.o. male referred to outpatient Physical Therapy with a medical diagnosis of central vertigo symptoms. Patient presents with decreased oculomotor reflexes with c/o episode of vertigo and lightheadedness.    Patient prognosis is Good.   Patient will benefit from skilled outpatient Physical Therapy to address the deficits stated above and in the chart below, provide patient /family education, and to maximize patientt's level of independence.     Plan of care discussed with patient: Yes  Patient's spiritual, cultural and educational needs considered and patient is agreeable to the plan of care and goals as stated below:     Anticipated Barriers for therapy: none identified    Medical Necessity is demonstrated by the following  History  Co-morbidities and  personal factors that may impact the plan of care [] LOW: no personal factors / co-morbidities  [x] MODERATE: 1-2 personal factors / co-morbidities  [] HIGH: 3+ personal factors / co-morbidities    Moderate / High Support Documentation:   Co-morbidities affecting plan of care:      Examination  Body Structures and Functions, activity limitations and participation restrictions that may impact the plan of care [x] LOW: addressing 1-2 elements  [] MODERATE: 3+ elements  [] HIGH: 4+ elements (please support below)    Moderate / High Support Documentation:      Clinical Presentation [x] LOW: stable  [] MODERATE: Evolving  [] HIGH: Unstable     Decision Making/ Complexity Score: low       Goals:  The following goals were discussed with the patient and patient is in agreement with them as to be addressed in the treatment plan.      STG'S: 3 weeks  1. Patient independent in HEP of balance, habituation, head-eye coordination, gaze stabilization, and repositioning.  Exercises to be done Daily.  2. Decrease patient's subjective rating of dizziness to a 4 on 0-10 scale) with forward bending and quick head turns  3.  Decrease patient's subjective rating of dizziness to a  2  (on 0-10 scale) or less as baseline.     LTG'S  : 6 weeks  1. Patient able to ambulate in community safely without assistive device, on varied terrainwith head movement, without LOB or c/o dizziness.  2. Patient's subjective rating of dizziness will decreased to 0 on 0-10 scale  3. Patient able to perform running for exercise without LOB or c/o dizziness  4. Patient able to drive independently without c/o dizziness  5. Pt to be I with self management of condition and progression vestibular program for maintenance.    Plan     Plan of care Certification: 3/12/2024 to 04/30/2024.    Outpatient Physical Therapy 1 times weekly for 6 weeks to include the following interventions: Gait Training, Neuromuscular Re-ed, Patient Education, Self Care, Therapeutic  Activities, and Therapeutic Exercise.     Tatiana Simon, PT ,DPT, MHA, CLT, CEAS

## 2024-03-14 ENCOUNTER — HOSPITAL ENCOUNTER (OUTPATIENT)
Dept: RADIOLOGY | Facility: OTHER | Age: 39
Discharge: HOME | End: 2024-03-14
Attending: STUDENT IN AN ORGANIZED HEALTH CARE EDUCATION/TRAINING PROGRAM
Payer: COMMERCIAL

## 2024-03-14 DIAGNOSIS — H81.01 MENIERE'S DISEASE OF RIGHT EAR: ICD-10-CM

## 2024-03-14 DIAGNOSIS — H90.41 SENSORINEURAL HEARING LOSS (SNHL) OF RIGHT EAR WITH UNRESTRICTED HEARING OF LEFT EAR: ICD-10-CM

## 2024-03-14 DIAGNOSIS — H81.4 VERTIGO OF CENTRAL ORIGIN: ICD-10-CM

## 2024-03-14 PROCEDURE — A9585 GADOBUTROL INJECTION: HCPCS | Performed by: STUDENT IN AN ORGANIZED HEALTH CARE EDUCATION/TRAINING PROGRAM

## 2024-03-14 PROCEDURE — 70553 MRI BRAIN STEM W/O & W/DYE: CPT | Mod: 26,,, | Performed by: STUDENT IN AN ORGANIZED HEALTH CARE EDUCATION/TRAINING PROGRAM

## 2024-03-14 PROCEDURE — 70553 MRI BRAIN STEM W/O & W/DYE: CPT | Mod: TC

## 2024-03-14 PROCEDURE — 25500020 PHARM REV CODE 255: Performed by: STUDENT IN AN ORGANIZED HEALTH CARE EDUCATION/TRAINING PROGRAM

## 2024-03-14 RX ORDER — GADOBUTROL 604.72 MG/ML
7 INJECTION INTRAVENOUS
Status: COMPLETED | OUTPATIENT
Start: 2024-03-14 | End: 2024-03-14

## 2024-03-14 RX ADMIN — GADOBUTROL 7 ML: 604.72 INJECTION INTRAVENOUS at 02:03

## 2024-03-19 ENCOUNTER — PATIENT MESSAGE (OUTPATIENT)
Dept: INTERNAL MEDICINE | Facility: CLINIC | Age: 39
End: 2024-03-19
Payer: COMMERCIAL

## 2024-03-21 ENCOUNTER — CLINICAL SUPPORT (OUTPATIENT)
Dept: REHABILITATION | Facility: OTHER | Age: 39
End: 2024-03-21
Payer: COMMERCIAL

## 2024-03-21 DIAGNOSIS — H81.4 VERTIGO OF CENTRAL ORIGIN: Primary | ICD-10-CM

## 2024-03-21 PROCEDURE — 97112 NEUROMUSCULAR REEDUCATION: CPT | Mod: PN | Performed by: PHYSICAL THERAPIST

## 2024-03-21 NOTE — PROGRESS NOTES
"[facility]   Physical Therapy Treatment Note      Name: Kaden Heller  Clinic Number: [id number]    Therapy Diagnosis:   Encounter Diagnosis   Name Primary?    Vertigo of central origin Yes     Physician: Shruti Nickerson MD    Visit Date: 3/21/2024  Physician Orders: PT Eval and Treat   Medical Diagnosis from Referral:   H90.41 (ICD-10-CM) - Sensorineural hearing loss (SNHL) of right ear with unrestricted hearing of left ear   H81.4 (ICD-10-CM) - Vertigo of central origin   H81.01 (ICD-10-CM) - Meniere's disease of right ear      Evaluation Date: 3/12/2024  Authorization Period Expiration: 02/01/2025  Plan of Care Expiration: 04/30/2024  Progress Note Due: 04/12/2024  Visit # / Visits authorized: 1(+Eval)/ 20   FOTO: 50/100     Precautions: Standard      Time In: 0908  Time Out: 0959  Total Appointment Time (timed & untimed codes): 51 minutes      Subjective     Patient reports: feels like maybe having something with allergies today.First time this morning  He was compliant with home exercise program. "They feel good when I do them" Notes that Saccades is "hard"  Response to previous treatment: last week was a good week; felt a little tired after last session  Functional change: not taking Antivert; pt notes that he has held off on yoga practice and would like to try again    Symptoms: 7/10  Description: foggy and lightheaded; pressure in both ears; fullness feeling; ringing in Right ear is intense today    Objective      Objective Measures updated at progress report unless specified.     03/21/2024    [place] SENSORY TESTING:  (P= Pass, F= Fail; note any sway; hold each position for 30")  Condition 1: (firm surface/feet together/eyes open) P  Condition 2: (firm surface/feet together/eyes closed) P  Condition 3: (firm surface/feet in tandem/eyes open) P  Condition 4: (firm surface/feet in tandem/eyes closed) 7, 30, P  Condition 5: (soft surface/feet together/eyes open) P  Condition 6: (soft " "surface/feet together/eyes closed) P    Pt reported that symptoms were 5/10 at end of session. " I feel a little better"    Treatment     Kaden received the treatments listed below:      therapeutic exercises to develop strength, endurance, ROM, flexibility, posture, and core stabilization for 00 minutes including:     neuromuscular re-education activities to improve: Balance, Coordination, Kinesthetic, Sense, Proprioception, and Posture for 51 minutes. The following activities were included:  +seated VOR horiz and vertical:  80 bpm 2 x 30 sec  +Standing FT: VOR Horiz and Vertical : 80 bpm 2 x 30 sec; no symptoms with vertical; horizontal dizziness after exercise completed  +standing FT Smooth pursuits horiz and Vert 80 bpm  +standing convergence 2x 30 seconds self paced  Objective testing as documented above     therapeutic activities to improve functional performance for 00  minutes, including:     gait training to improve functional mobility and safety for 00  minutes, including:    Patient Education and Home Exercises       Education provided:   -Progression of  Occu exercises to integrate standing balance  - Vestibular rehabilitation  -HEP     Written Home Exercises Provided: Patient instructed to cont prior HEP and added 3 more exercises to HEP for progression of initial HEP. Exercises were reviewed and Kaden was able to demonstrate them prior to the end of the session.  Kaden demonstrated good  understanding of the education provided. See EMR under Patient Instructions for exercises provided during therapy sessions.  Assessment     Kaden is a 38 y.o. male referred to outpatient Physical Therapy with a medical diagnosis of central vertigo symptoms. Patient presents with decreased oculomotor reflexes with c/o episode of vertigo and lightheadedness. Pt has not had any episodes of dizziness/vertigo since last session.  Pt is progressing very well.     Kaden Is progressing well towards his goals.   Patient " prognosis is Good.     Patient will continue to benefit from skilled outpatient physical therapy to address the deficits listed in the problem list box on initial evaluation, provide pt/family education and to maximize pt's level of independence in the home and community environment.     Patient's spiritual, cultural and educational needs considered and pt agreeable to plan of care and goals.     Anticipated barriers to physical therapy: none identified     Goals:   1. Patient independent in HEP of balance, habituation, head-eye coordination, gaze stabilization, and repositioning.  Exercises to be done Daily.met  2. Decrease patient's subjective rating of dizziness to a 4 on 0-10 scale) with forward bending and quick head turns Progressing, not met  3.  Decrease patient's subjective rating of dizziness to a  2  (on 0-10 scale) or less as baseline. Progressing, not met     LTG'S  : 6 weeks  1. Patient able to ambulate in community safely without assistive device, on varied terrainwith head movement, without LOB or c/o dizziness. Progressing, not met  2. Patient's subjective rating of dizziness will decreased to 0 on 0-10 scale Progressing, not met  3. Patient able to perform running/ yoga for exercise without LOB or c/o dizziness Progressing, not met  4. Patient able to drive independently without c/o dizziness Progressing, not met  5. Pt to be I with self management of condition and progression vestibular program for maintenance.Progressing, not met     Plan      Plan of care Certification: 3/12/2024 to 04/30/2024.     Outpatient Physical Therapy 1 times weekly for 6 weeks to include the following interventions: Gait Training, Neuromuscular Re-ed, Patient Education, Self Care, Therapeutic Activities, and Therapeutic Exercise.      Tatiana Simon, PT ,DPT, MHA, CLT, CEAS

## 2024-03-26 ENCOUNTER — CLINICAL SUPPORT (OUTPATIENT)
Dept: REHABILITATION | Facility: OTHER | Age: 39
End: 2024-03-26
Payer: COMMERCIAL

## 2024-03-26 DIAGNOSIS — H81.4 VERTIGO OF CENTRAL ORIGIN: Primary | ICD-10-CM

## 2024-03-26 DIAGNOSIS — H81.01 MENIERE'S DISEASE OF RIGHT EAR: ICD-10-CM

## 2024-03-26 DIAGNOSIS — H90.41 SENSORINEURAL HEARING LOSS (SNHL) OF RIGHT EAR WITH UNRESTRICTED HEARING OF LEFT EAR: ICD-10-CM

## 2024-03-26 PROCEDURE — 97112 NEUROMUSCULAR REEDUCATION: CPT | Mod: PN | Performed by: PHYSICAL THERAPIST

## 2024-03-26 NOTE — PROGRESS NOTES
"[facility]   Physical Therapy Treatment Note      Name: Kaden Heller  Clinic Number: [id number]    Therapy Diagnosis:   Encounter Diagnoses   Name Primary?    Vertigo of central origin Yes    Meniere's disease of right ear     Sensorineural hearing loss (SNHL) of right ear with unrestricted hearing of left ear        Physician: Shruti Nickerson MD    Visit Date: 3/26/2024  Physician Orders: PT Eval and Treat   Medical Diagnosis from Referral:   H90.41 (ICD-10-CM) - Sensorineural hearing loss (SNHL) of right ear with unrestricted hearing of left ear   H81.4 (ICD-10-CM) - Vertigo of central origin   H81.01 (ICD-10-CM) - Meniere's disease of right ear      Evaluation Date: 3/12/2024  Authorization Period Expiration: 02/01/2025  Plan of Care Expiration: 04/30/2024  Progress Note Due: 04/12/2024  Visit # / Visits authorized: 2(+Eval)/ 20   FOTO: 50/100     Precautions: Standard      Time In: 1202  Time Out: 1245  Total Appointment Time (timed & untimed codes): 43 minutes      Subjective     Patient reports: no symptoms except that fluorescent lights and echoes in clinic areas  He was compliant with home exercise program.  Response to previous treatment:feels like the exercises are getting better but the saccades are still difficult  Functional change: no dizziness; notices increased exertion connected to increased ringing and increased ear pressure    Symptoms: 3/10  Description: "Feels great overall" but ringing in right ear and pressure in both ears    Objective      Objective Measures updated at progress report unless specified.     03/21/2024    [place] SENSORY TESTING:  (P= Pass, F= Fail; note any sway; hold each position for 30")  Condition 1: (firm surface/feet together/eyes open) P  Condition 2: (firm surface/feet together/eyes closed) P  Condition 3: (firm surface/feet in tandem/eyes open) P  Condition 4: (firm surface/feet in tandem/eyes closed) 7, 30, P  Condition 5: (soft surface/feet " "together/eyes open) P  Condition 6: (soft surface/feet together/eyes closed) P    Pt reported that symptoms were 5/10 at end of session. " I feel a little better"    Treatment     Kaden received the treatments listed below:      therapeutic exercises to develop strength, endurance, ROM, flexibility, posture, and core stabilization for 00 minutes including:     neuromuscular re-education activities to improve: Balance, Coordination, Kinesthetic, Sense, Proprioception, and Posture for 43 minutes. The following activities were included:  +seated VOR horiz and vertical:  120 bpm 2 x 30 sec  +Seated Saccades 115 bpm 1x 30 sec  +Seated smooth pursuits 115 bpm 1 x 30 sec  +Saccades 4 targets (boxed) on [employer] clockwise and counter clockwise  +[employer] video on youtube in seated x 6 minutes     Not performed today  +standing convergence 2x 30 seconds self paced  +Standing FT: VOR Horiz and Vertical : 80 bpm 2 x 30 sec; no symptoms with vertical; horizontal dizziness after exercise completed  +standing FT Smooth pursuits horiz and Vert 115  bpm      therapeutic activities to improve functional performance for 00  minutes, including:     gait training to improve functional mobility and safety for 00  minutes, including:    Patient Education and Home Exercises       Education provided:   -Progression of  Occu exercises to integrate standing balance  - Vestibular rehabilitation  -HEP     Written Home Exercises Provided: Patient instructed to cont prior HEP and added 3 more exercises to HEP for progression of initial HEP. Exercises were reviewed and Kaden was able to demonstrate them prior to the end of the session.  Kaden demonstrated good  understanding of the education provided. See EMR under Patient Instructions for exercises provided during therapy sessions.  Assessment     Kaden is a 38 y.o. male referred to outpatient Physical Therapy with a medical diagnosis of central vertigo symptoms. Patient presents with improving " oculomotor reflexes without c/o episode of vertigo and lightheadedness. Pt has not had any episodes of dizziness/vertigo since last session.  Pt is progressing very well.     aKden Is progressing well towards his goals.   Patient prognosis is Good.     Patient will continue to benefit from skilled outpatient physical therapy to address the deficits listed in the problem list box on initial evaluation, provide pt/family education and to maximize pt's level of independence in the home and community environment.     Patient's spiritual, cultural and educational needs considered and pt agreeable to plan of care and goals.     Anticipated barriers to physical therapy: none identified     Goals:   1. Patient independent in HEP of balance, habituation, head-eye coordination, gaze stabilization, and repositioning.  Exercises to be done Daily.met  2. Decrease patient's subjective rating of dizziness to a 4 on 0-10 scale) with forward bending and quick head turns  met  3.  Decrease patient's subjective rating of dizziness to a  2  (on 0-10 scale) or less as baseline. Progressing, not met     LTG'S  : 6 weeks  1. Patient able to ambulate in community safely without assistive device, on varied terrainwith head movement, without LOB or c/o dizziness. met  2. Patient's subjective rating of dizziness will decreased to 0 on 0-10 scale Progressing, not met  3. Patient able to perform running/ yoga for exercise without LOB or c/o dizziness Progressing, not met  4. Patient able to drive independently without c/o dizziness Progressing, not met  5. Pt to be I with self management of condition and progression vestibular program for maintenance.Progressing, not met     Plan      Plan of care Certification: 3/12/2024 to 04/30/2024.  Continue 1-2 sessions to monitor pt tolerance to increasing activity  Outpatient Physical Therapy 1 times weekly for 6 weeks to include the following interventions: Gait Training, Neuromuscular Re-ed, Patient  Education, Self Care, Therapeutic Activities, and Therapeutic Exercise.      Tatiana Simon, PT ,DPT, MHA, CLT, CEAS

## 2024-04-04 ENCOUNTER — CLINICAL SUPPORT (OUTPATIENT)
Dept: REHABILITATION | Facility: OTHER | Age: 39
End: 2024-04-04
Payer: COMMERCIAL

## 2024-04-04 DIAGNOSIS — H81.01 MENIERE'S DISEASE OF RIGHT EAR: ICD-10-CM

## 2024-04-04 DIAGNOSIS — H81.4 VERTIGO OF CENTRAL ORIGIN: Primary | ICD-10-CM

## 2024-04-04 DIAGNOSIS — H90.41 SENSORINEURAL HEARING LOSS (SNHL) OF RIGHT EAR WITH UNRESTRICTED HEARING OF LEFT EAR: ICD-10-CM

## 2024-04-04 PROCEDURE — 97530 THERAPEUTIC ACTIVITIES: CPT | Mod: PN | Performed by: PHYSICAL THERAPIST

## 2024-04-04 NOTE — PROGRESS NOTES
[facility]   Physical Therapy Treatment Note      Name: Kaden Heller  Clinic Number: [id number]    Therapy Diagnosis:   Encounter Diagnoses   Name Primary?    Vertigo of central origin Yes    Meniere's disease of right ear     Sensorineural hearing loss (SNHL) of right ear with unrestricted hearing of left ear      Physician: Shruti Nickerson MD    Visit Date: 4/4/2024  Physician Orders: PT Eval and Treat   Medical Diagnosis from Referral:   H90.41 (ICD-10-CM) - Sensorineural hearing loss (SNHL) of right ear with unrestricted hearing of left ear   H81.4 (ICD-10-CM) - Vertigo of central origin   H81.01 (ICD-10-CM) - Meniere's disease of right ear      Evaluation Date: 3/12/2024  Authorization Period Expiration: 02/01/2025  Plan of Care Expiration: 04/30/2024  Progress Note Due: 04/12/2024  Visit # / Visits authorized: 3(+Eval)/ 20   FOTO: 50/100     Precautions: Standard      Time In: 1205  Time Out: 1220  Total Appointment Time (timed & untimed codes): 15 minutes    Subjective     Patient reports: noticed that music can soothe ringing in right ear; eye twitching hhas calmed down in the last week; ran 10k this weekend and felt no symptoms  He was compliant with home exercise program.  Response to previous treatment:nothing to report  Functional change: kadi overall improved    Symptoms: 00/10    Objective      Objective Measures updated at progress report unless specified.     Treatment     Kaden received the treatments listed below:      therapeutic exercises to develop strength, endurance, ROM, flexibility, posture, and core stabilization for 00 minutes including:     neuromuscular re-education activities to improve: Balance, Coordination, Kinesthetic, Sense, Proprioception, and Posture for 00 minutes. The following activities were included:     Not performed today  +standing convergence 2x 30 seconds self paced  +Standing FT: VOR Horiz and Vertical : 80 bpm 2 x 30 sec; no symptoms with  vertical; horizontal dizziness after exercise completed  +standing FT Smooth pursuits horiz and Vert 115  bpm  +seated VOR horiz and vertical:  120 bpm 2 x 30 sec  +Seated Saccades 115 bpm 1x 30 sec  +Seated smooth pursuits 115 bpm 1 x 30 sec  +Saccades 4 targets (boxed) on busy bkgd clockwise and counter clockwise  +OKN video on youtube in seated x 6 minutes      therapeutic activities to improve functional performance for 15 minutes, including:  +Reviewed response to physical activity  +Discussed managing fluorescent lighting when able  +Educated pt to continue above exercises and to increase running and yoga/meditation to pt tolerance  +pt advised to follow up via [employer] message in a couple of weeks to check in on progress and to ask any questions  +Pt advised to follow up with if symptoms return to for additional evaluation and treatment     gait training to improve functional mobility and safety for 00  minutes, including:    Patient Education and Home Exercises       Education provided:   -Progression of  Occu exercises to integrate standing balance  - Vestibular rehabilitation  -HEP  -Discharge to self care/management     Written Home Exercises Provided: Patient instructed to cont prior HEP. Kaden demonstrated good  understanding of the education provided. See EMR under Patient Instructions for exercises provided during therapy sessions.  Assessment     Kaden is a 38 y.o. male referred to outpatient Physical Therapy with a medical diagnosis of central vertigo symptoms. Pt has not had any episodes of dizziness/vertigo since last session.  Pt has met all goals set on initial evaluation     Kaden Is progressing well towards his goals.   Patient prognosis is Good.     Patient's spiritual, cultural and educational needs considered and pt agreeable to plan of care and goals.     Anticipated barriers to physical therapy: none identified     Goals:   1. Patient independent in HEP of balance, habituation, head-eye  coordination, gaze stabilization, and repositioning.  Exercises to be done Daily.met  2. Decrease patient's subjective rating of dizziness to a 4 on 0-10 scale) with forward bending and quick head turns  met  3.  Decrease patient's subjective rating of dizziness to a  2  (on 0-10 scale) or less as baseline. met     LTG'S  : 6 weeks  1. Patient able to ambulate in community safely without assistive device, on varied terrainwith head movement, without LOB or c/o dizziness. met  2. Patient's subjective rating of dizziness will decreased to 0 on 0-10 scale met  3. Patient able to perform running/ yoga for exercise without LOB or c/o dizziness met  4. Patient able to drive independently without c/o dizziness met  5. Pt to be I with self management of condition and progression vestibular program for maintenance. met     PHYSICAL THERAPY DISCHARGE SUMMARY   Total Visits:4  Missed Visits:0  Cancelled Visits: 0  Status Towards Goals Met: 8/8  Goals Not achieved and why:   na    Discharge reason : Met goals    PLAN   This patient is discharged from Outpatient Physical Therapy Services.     Tatiana Simon, PT ,DPT, MHA, CLT, CEAS

## 2024-04-11 ENCOUNTER — PATIENT MESSAGE (OUTPATIENT)
Dept: ADMINISTRATIVE | Facility: OTHER | Age: 39
End: 2024-04-11
Payer: COMMERCIAL

## 2024-04-20 NOTE — PROGRESS NOTES
Subjective:       Patient ID: Kaden Heller is a 39 y.o. male.    Chief Complaint: No chief complaint on file.      The patient is returning for follow-up visit.  It has been 3 months since I last saw him.  There are issues to discuss:  1. Hearing loss right ear/Meniere's disease right:   the patient overall has progressive symptoms in both his right ear and left.  He is now having tinnitus in pressure in the left ear.  He feels that his hearing has worsened somewhat on the right.  2. Tinnitus:    Two or 3 days per week the patient developed a high-pitched tinnitus  In his left ear that increases in 10 city until it resolves. This is the way symptoms began on his right side.  The tinnitus on the right side is constant  3. Allergic rhinitis: nasal secretions are clear.  He is taking loratadine in the morning, montelukast at night and using mometasone nasal spray once daily.   4. Headaches:  When he tries to go caffeine free he develops headaches.  He is weaning himself off of caffeine but has not yet been able to stop it.  He actually feels that the pressure in his ears increases when he does not consume small amounts of caffeine.          Review of Systems     Constitutional: Positive for fatigue.  Negative for appetite change, chills, fever and unexpected weight loss.      HENT: Positive for ear pain, hearing loss, postnasal drip, ringing in the ears, runny nose, sinus pressure, sore throat and stuffy nose.  Negative for ear discharge, ear infection, facial swelling, mouth sores, nosebleeds, sinus infection, tonsil infection, dental problems, trouble swallowing and voice change.      Eyes:  Negative for change in eyesight, eye drainage, eye itching and photophobia.     Respiratory:  Positive for cough. Negative for shortness of breath, sleep apnea, snoring and wheezing.      Cardiovascular:  Positive for chest pain. Negative for foot swelling, irregular heartbeat and swollen veins.     Gastrointestinal:  Negative  for abdominal pain, acid reflux, constipation, diarrhea, heartburn and vomiting.     Genitourinary: Negative for difficulty urinating, sexual problems and frequent urination.     Musc: Negative for aching joints, aching muscles, back pain and neck pain.     Skin: Negative for rash.     Allergy: Positive for seasonal allergies. Negative for food allergies.     Endocrine: Negative for cold intolerance and heat intolerance.      Neurological: Positive for dizziness, headaches and light-headedness. Negative for seizures and tremors.      Hematologic: Negative for bruises/bleeds easily and swollen glands.      Psychiatric: Positive for depression and nervous/anxious. Negative for decreased concentration and sleep disturbance.              Objective:      Physical Exam  Vitals and nursing note reviewed.   Constitutional:       General: He is awake.      Appearance: Normal appearance. He is well-developed, well-groomed and normal weight.   HENT:      Head: Normocephalic.      Jaw: There is normal jaw occlusion.      Salivary Glands: Right salivary gland is not diffusely enlarged or tender. Left salivary gland is not diffusely enlarged or tender.      Right Ear: Ear canal and external ear normal. Decreased hearing noted. Tympanic membrane is retracted.      Left Ear: Hearing, ear canal and external ear normal. Tympanic membrane is retracted.      Nose: Septal deviation (to the right), mucosal edema (cyanotic, boggy inferior turbinates bilaterally) and rhinorrhea (clear mucus bilaterally) present. No nasal deformity. Rhinorrhea is clear.      Right Turbinates: Enlarged and pale.      Left Turbinates: Enlarged and pale.      Mouth/Throat:      Lips: No lesions.      Mouth: Mucous membranes are moist. No oral lesions.      Dentition: No gum lesions.      Tongue: No lesions.      Palate: No mass and lesions.      Pharynx: Oropharynx is clear. Uvula midline.      Tonsils: 2+ on the right. 2+ on the left.   Eyes:      General:  Lids are normal.         Right eye: No discharge.         Left eye: No discharge.      Conjunctiva/sclera:      Right eye: Right conjunctiva is injected. No exudate.     Left eye: Left conjunctiva is injected. No exudate.     Pupils: Pupils are equal, round, and reactive to light.   Neck:      Thyroid: No thyroid mass or thyromegaly.      Vascular: No carotid bruit.      Trachea: Trachea normal. No tracheal deviation.   Cardiovascular:      Rate and Rhythm: Normal rate and regular rhythm.      Pulses: Normal pulses.      Heart sounds: Normal heart sounds.   Pulmonary:      Effort: Pulmonary effort is normal.      Breath sounds: Normal breath sounds. No stridor. No decreased breath sounds, wheezing, rhonchi or rales.   Abdominal:      General: Bowel sounds are normal.      Palpations: Abdomen is soft.      Tenderness: There is no abdominal tenderness.   Musculoskeletal:         General: Normal range of motion.      Cervical back: Normal range of motion and neck supple. No muscular tenderness.   Lymphadenopathy:      Head:      Right side of head: No submental, submandibular, preauricular, posterior auricular or occipital adenopathy.      Left side of head: No submental, submandibular, preauricular, posterior auricular or occipital adenopathy.      Cervical: No cervical adenopathy.   Skin:     General: Skin is warm and dry.      Findings: No petechiae or rash.      Nails: There is no clubbing.   Neurological:      Mental Status: He is alert and oriented to person, place, and time.      Cranial Nerves: No cranial nerve deficit.      Sensory: No sensory deficit.      Gait: Gait normal.      Comments: Neuro otologic-no nystagmus, normal gait, Romberg negative, tandem Romberg falls to the left   Psychiatric:         Speech: Speech normal.         Behavior: Behavior normal. Behavior is cooperative.         Thought Content: Thought content normal.         Judgment: Judgment normal.        Serum potassium  02/09/2024-3.7                        I personally reviewed the above audiogram and discuss them full detail with the patient during his visit.  I offered him copies of each/all of the audiograms.  Pertinent findings:  Moderate sensorineural hearing loss right ear with normal hearing left ear, minimally diminished auditory discrimination at increased speech level in the right ear and normal impedance audiometry        Assessment:       1. Meniere's disease of right ear    2. Sensorineural hearing loss (SNHL) of right ear with unrestricted hearing of left ear    3. Tinnitus of both ears    4. Allergic rhinitis, unspecified seasonality, unspecified trigger    5. Nasal septal deviation    6. Tinnitus of right ear    7. Migraine equivalent syndrome          Plan:        I am advising the patient to  take a 2nd dose of Dyazide on days where he notices increasing pressure or increasing tinnitus.  He is inquiring about starting betahistine therapy.  I will send a message to my otology colleagues regarding this issue.  I will have him continue with his allergy therapy and I will recheck him in 3 months            DISCLAIMER: This note was prepared with [employer] voice recognition transcription software. Garbled syntax, mangled pronouns, and other bizarre constructions may be attributed to that software system. While efforts were made to correct any mistakes made by this voice recognition program, some errors and/or omissions may remain in the note that were missed when the note was originally created.

## 2024-04-22 ENCOUNTER — OFFICE VISIT (OUTPATIENT)
Dept: OTOLARYNGOLOGY | Facility: CLINIC | Age: 39
End: 2024-04-22
Payer: COMMERCIAL

## 2024-04-22 VITALS
HEART RATE: 68 BPM | BODY MASS INDEX: 23 KG/M2 | SYSTOLIC BLOOD PRESSURE: 128 MMHG | DIASTOLIC BLOOD PRESSURE: 94 MMHG | WEIGHT: 155.75 LBS | OXYGEN SATURATION: 96 %

## 2024-04-22 DIAGNOSIS — H90.41 SENSORINEURAL HEARING LOSS (SNHL) OF RIGHT EAR WITH UNRESTRICTED HEARING OF LEFT EAR: ICD-10-CM

## 2024-04-22 DIAGNOSIS — J34.2 NASAL SEPTAL DEVIATION: ICD-10-CM

## 2024-04-22 DIAGNOSIS — H93.13 TINNITUS OF BOTH EARS: ICD-10-CM

## 2024-04-22 DIAGNOSIS — H81.01 MENIERE'S DISEASE OF RIGHT EAR: Primary | ICD-10-CM

## 2024-04-22 DIAGNOSIS — H93.11 TINNITUS OF RIGHT EAR: ICD-10-CM

## 2024-04-22 DIAGNOSIS — G43.109 MIGRAINE EQUIVALENT SYNDROME: ICD-10-CM

## 2024-04-22 DIAGNOSIS — J30.9 ALLERGIC RHINITIS, UNSPECIFIED SEASONALITY, UNSPECIFIED TRIGGER: ICD-10-CM

## 2024-04-22 PROCEDURE — 1160F RVW MEDS BY RX/DR IN RCRD: CPT | Mod: CPTII,S$GLB,, | Performed by: SPECIALIST

## 2024-04-22 PROCEDURE — 99999 PR PBB SHADOW E&M-EST. PATIENT-LVL III: CPT | Mod: PBBFAC,,, | Performed by: SPECIALIST

## 2024-04-22 PROCEDURE — 1159F MED LIST DOCD IN RCRD: CPT | Mod: CPTII,S$GLB,, | Performed by: SPECIALIST

## 2024-04-22 PROCEDURE — 3008F BODY MASS INDEX DOCD: CPT | Mod: CPTII,S$GLB,, | Performed by: SPECIALIST

## 2024-04-22 PROCEDURE — 3074F SYST BP LT 130 MM HG: CPT | Mod: CPTII,S$GLB,, | Performed by: SPECIALIST

## 2024-04-22 PROCEDURE — 3080F DIAST BP >= 90 MM HG: CPT | Mod: CPTII,S$GLB,, | Performed by: SPECIALIST

## 2024-04-22 PROCEDURE — 99214 OFFICE O/P EST MOD 30 MIN: CPT | Mod: S$GLB,,, | Performed by: SPECIALIST

## 2024-04-29 ENCOUNTER — PATIENT MESSAGE (OUTPATIENT)
Dept: OTOLARYNGOLOGY | Facility: CLINIC | Age: 39
End: 2024-04-29
Payer: COMMERCIAL

## 2024-05-01 ENCOUNTER — TELEPHONE (OUTPATIENT)
Dept: NEUROLOGY | Facility: CLINIC | Age: 39
End: 2024-05-01
Payer: COMMERCIAL

## 2024-05-01 ENCOUNTER — PATIENT MESSAGE (OUTPATIENT)
Dept: NEUROLOGY | Facility: CLINIC | Age: 39
End: 2024-05-01
Payer: COMMERCIAL

## 2024-05-01 NOTE — TELEPHONE ENCOUNTER
----- Message from AGUSTIN Saravia MD sent at 4/30/2024  5:00 PM CDT -----  Thank you for responding and for taking care of this.  Jarrett Saravia  ----- Message -----  From: Shruti Nickerson MD  Sent: 4/30/2024   4:29 PM CDT  To: AGUSTIN Saravia MD; aPu Solorzano MA    Hi Dr. Saravia,  Thanks for reaching out. We can absolutely schedule a f/u. No need for referral.     Pau please schedule a VV to discuss.    Thanks  ----- Message -----  From: AGUSTIN Saravia MD  Sent: 4/30/2024   8:21 AM CDT  To: MD Dr. Liya Alfredo,  You have recently seen Kaden eHller.  He is having an increase in his ear symptoms.  He has seen Dr. Sivakumar Moore, an otologist, who feels that he is having migraine variant as opposed to Meniere's.  Do you feel you need to see him again to evaluate this specific issue?  Do I need to send you a consult if you do want to see him?  Thank you,  AGUSTIN Saravia M.D.

## 2024-05-03 ENCOUNTER — TELEPHONE (OUTPATIENT)
Dept: NEUROLOGY | Facility: CLINIC | Age: 39
End: 2024-05-03
Payer: COMMERCIAL

## 2024-05-03 NOTE — TELEPHONE ENCOUNTER
----- Message from Cheli Fuentes sent at 5/3/2024  1:33 PM CDT -----  Type:  Return Call    Who Called: DONOVAN VILLALBA [[id number]]    Who Left Message for Patient: Pau    Does the patient know what this is regarding?: YES     Would the patient rather a call back or a response via [facility]? Call back     Best Call Back Number: [phone]    Additional Information: Pt say he has his ringer on this time to answer call to schedule Virtual appt

## 2024-05-13 DIAGNOSIS — Z72.52 HIGH RISK HOMOSEXUAL BEHAVIOR: Primary | ICD-10-CM

## 2024-05-13 RX ORDER — EMTRICITABINE AND TENOFOVIR DISOPROXIL FUMARATE 200; 300 MG/1; MG/1
1 TABLET, FILM COATED ORAL DAILY
Qty: 90 TABLET | Refills: 0 | Status: CANCELLED | OUTPATIENT
Start: 2024-05-13

## 2024-05-16 ENCOUNTER — LAB VISIT (OUTPATIENT)
Dept: LAB | Facility: HOSPITAL | Age: 39
End: 2024-05-16
Payer: COMMERCIAL

## 2024-05-16 ENCOUNTER — OFFICE VISIT (OUTPATIENT)
Dept: INTERNAL MEDICINE | Facility: CLINIC | Age: 39
End: 2024-05-16
Payer: COMMERCIAL

## 2024-05-16 VITALS
DIASTOLIC BLOOD PRESSURE: 88 MMHG | WEIGHT: 158.94 LBS | BODY MASS INDEX: 23.54 KG/M2 | HEIGHT: 69 IN | SYSTOLIC BLOOD PRESSURE: 134 MMHG | HEART RATE: 73 BPM | OXYGEN SATURATION: 97 %

## 2024-05-16 DIAGNOSIS — Z72.52 HIGH RISK HOMOSEXUAL BEHAVIOR: ICD-10-CM

## 2024-05-16 DIAGNOSIS — Z72.52 HIGH RISK HOMOSEXUAL BEHAVIOR: Primary | ICD-10-CM

## 2024-05-16 LAB
ALBUMIN SERPL BCP-MCNC: 4.3 G/DL (ref 3.5–5.2)
ALP SERPL-CCNC: 67 U/L (ref 55–135)
ALT SERPL W/O P-5'-P-CCNC: 150 U/L (ref 10–44)
ANION GAP SERPL CALC-SCNC: 9 MMOL/L (ref 8–16)
AST SERPL-CCNC: 90 U/L (ref 10–40)
BILIRUB SERPL-MCNC: 0.6 MG/DL (ref 0.1–1)
BUN SERPL-MCNC: 9 MG/DL (ref 6–20)
CALCIUM SERPL-MCNC: 9.6 MG/DL (ref 8.7–10.5)
CHLORIDE SERPL-SCNC: 102 MMOL/L (ref 95–110)
CO2 SERPL-SCNC: 28 MMOL/L (ref 23–29)
CREAT SERPL-MCNC: 1 MG/DL (ref 0.5–1.4)
EST. GFR  (NO RACE VARIABLE): >60 ML/MIN/1.73 M^2
GLUCOSE SERPL-MCNC: 86 MG/DL (ref 70–110)
HIV 1+2 AB+HIV1 P24 AG SERPL QL IA: NORMAL
POTASSIUM SERPL-SCNC: 3.9 MMOL/L (ref 3.5–5.1)
PROT SERPL-MCNC: 7.4 G/DL (ref 6–8.4)
SODIUM SERPL-SCNC: 139 MMOL/L (ref 136–145)
TREPONEMA PALLIDUM IGG+IGM AB [PRESENCE] IN SERUM OR PLASMA BY IMMUNOASSAY: NONREACTIVE

## 2024-05-16 PROCEDURE — 80053 COMPREHEN METABOLIC PANEL: CPT | Performed by: NURSE PRACTITIONER

## 2024-05-16 PROCEDURE — 87491 CHLMYD TRACH DNA AMP PROBE: CPT | Mod: 59 | Performed by: NURSE PRACTITIONER

## 2024-05-16 PROCEDURE — 87591 N.GONORRHOEAE DNA AMP PROB: CPT | Performed by: NURSE PRACTITIONER

## 2024-05-16 PROCEDURE — 1159F MED LIST DOCD IN RCRD: CPT | Mod: CPTII,S$GLB,, | Performed by: NURSE PRACTITIONER

## 2024-05-16 PROCEDURE — 99999 PR PBB SHADOW E&M-EST. PATIENT-LVL IV: CPT | Mod: PBBFAC,,, | Performed by: NURSE PRACTITIONER

## 2024-05-16 PROCEDURE — 86593 SYPHILIS TEST NON-TREP QUANT: CPT | Performed by: NURSE PRACTITIONER

## 2024-05-16 PROCEDURE — 1160F RVW MEDS BY RX/DR IN RCRD: CPT | Mod: CPTII,S$GLB,, | Performed by: NURSE PRACTITIONER

## 2024-05-16 PROCEDURE — 87591 N.GONORRHOEAE DNA AMP PROB: CPT | Mod: 59 | Performed by: NURSE PRACTITIONER

## 2024-05-16 PROCEDURE — 99213 OFFICE O/P EST LOW 20 MIN: CPT | Mod: S$GLB,,, | Performed by: NURSE PRACTITIONER

## 2024-05-16 PROCEDURE — 36415 COLL VENOUS BLD VENIPUNCTURE: CPT | Performed by: NURSE PRACTITIONER

## 2024-05-16 PROCEDURE — 3079F DIAST BP 80-89 MM HG: CPT | Mod: CPTII,S$GLB,, | Performed by: NURSE PRACTITIONER

## 2024-05-16 PROCEDURE — 87389 HIV-1 AG W/HIV-1&-2 AB AG IA: CPT | Performed by: NURSE PRACTITIONER

## 2024-05-16 PROCEDURE — 3008F BODY MASS INDEX DOCD: CPT | Mod: CPTII,S$GLB,, | Performed by: NURSE PRACTITIONER

## 2024-05-16 PROCEDURE — 3075F SYST BP GE 130 - 139MM HG: CPT | Mod: CPTII,S$GLB,, | Performed by: NURSE PRACTITIONER

## 2024-05-16 NOTE — PROGRESS NOTES
"Subjective     Patient ID: Kaden Heller is a 39 y.o. male.  /88 (BP Location: Right arm, Patient Position: Sitting)   Pulse 73   Ht 5' 9" (1.753 m)   Wt 72.1 kg (158 lb 15.2 oz)   SpO2 97%   BMI 23.47 kg/m²      Chief Complaint: prep    Presenting for 3 month follow-up for PrEP for prevention of HIV. Tolerating medication well and without side effects. Only missed one dose. Has one primary sexual partner in the past 3 months and has only participated in oral. Has not participated anal/oral contact. Not currently experiencing STI symptoms.     Patient Active Problem List   Diagnosis    Family history of cardiovascular disease    Mixed hyperlipidemia    Hypertriglyceridemia    Sensorineural hearing loss (SNHL) of right ear with unrestricted hearing of left ear    Tinnitus of right ear    Meniere's disease of right ear    Migraine equivalent syndrome    Allergic rhinitis    Nasal septal deviation    GUSTABO (generalized anxiety disorder)    Hypertension    Fatty liver    Panic disorder    Elevated LFTs    On PrEP for prevention of HIV        Current Outpatient Medications   Medication Sig Dispense Refill    emtricitabine-tenofovir 200-300 mg (TRUVADA) 200-300 mg Tab Take 1 tablet by mouth once daily. 90 tablet 0    ergocalciferol (ERGOCALCIFEROL) 50,000 unit Cap Take 1 capsule (50,000 Units total) by mouth every 7 days. 12 capsule 3    FLUoxetine 20 MG capsule Take 1 capsule (20 mg total) by mouth once daily. 30 capsule 11    loratadine (CLARITIN) 5 mg chewable tablet Take 5 mg by mouth once daily.      meclizine (ANTIVERT) 12.5 mg tablet Take 1 tablet (12.5 mg total) by mouth 2 (two) times daily as needed for Dizziness. 30 tablet 11    mometasone (NASONEX) 50 mcg/actuation nasal spray Use 2 sprays by Nasal route once daily. 17 g 11    montelukast (SINGULAIR) 10 mg tablet Take 1 tablet by mouth every evening. 30 tablet 1    rosuvastatin (CRESTOR) 10 MG tablet Take 1 tablet (10 mg total) by mouth once daily. 90 " tablet 3    triamterene-hydrochlorothiazide 37.5-25 mg (DYAZIDE) 37.5-25 mg per capsule Take 1 capsule by mouth every morning. 30 capsule 11    ALPRAZolam (XANAX) 0.25 MG tablet Take 1 tablet (0.25 mg total) by mouth nightly as needed (panic attack). 30 tablet 0    famotidine (PEPCID) 40 MG tablet Take 1 tablet (40 mg total) by mouth once daily. for 14 days 14 tablet 0     No current facility-administered medications for this visit.        Review of Systems   HENT:  Positive for ear pain.    All other systems reviewed and are negative.         Objective     Physical Exam  Constitutional:       General: He is not in acute distress.     Appearance: Normal appearance. He is normal weight. He is not ill-appearing, toxic-appearing or diaphoretic.   HENT:      Head: Normocephalic and atraumatic.   Cardiovascular:      Rate and Rhythm: Normal rate.   Pulmonary:      Effort: Pulmonary effort is normal.   Abdominal:      General: Abdomen is flat.   Musculoskeletal:         General: Normal range of motion.      Cervical back: Normal range of motion.   Skin:     General: Skin is warm and dry.   Neurological:      General: No focal deficit present.      Mental Status: He is alert and oriented to person, place, and time.   Psychiatric:         Mood and Affect: Mood normal.         Behavior: Behavior normal.        Assessment and Plan     1. High risk homosexual behavior; labs toady, per CDC guidelines for PrEP for prevention of HIV in a high-risk individual   -     HIV 1/2 Ag/Ab (4th Gen); Future; Expected date: 05/16/2024  -     C.trach/N.gonor AMP RNA  -     C. trachomatis/N. gonorrhoeae by AMP DNA [facility]; Urine  -     Comprehensive Metabolic Panel; Future; Expected date: 05/16/2024  -     Treponema Pallidium Antibodies IgG, IgM; Future; Expected date: 05/16/2024  -     Safe sex counseling.  -     Discussed risks vs benefits of PrEP, per current CDC guidelines.  -     Advised that PrEP does not reduce the risk of transmission  of other STI's.  -     OK to refill truvada if HIV negative and renal function normal.         Masood Washington NP   Internal Medicine           Follow up in about 11 weeks (around 8/1/2024) for PrEP.

## 2024-05-17 ENCOUNTER — PATIENT MESSAGE (OUTPATIENT)
Dept: INTERNAL MEDICINE | Facility: CLINIC | Age: 39
End: 2024-05-17
Payer: COMMERCIAL

## 2024-05-17 LAB
C TRACH DNA SPEC QL NAA+PROBE: NOT DETECTED
N GONORRHOEA DNA SPEC QL NAA+PROBE: NOT DETECTED

## 2024-05-17 NOTE — TELEPHONE ENCOUNTER
Liver enzymes elevated from three months ago. Started on Truvada for prevention of HIV when enzymes were lower. Patient drinks a seltzer a few nights a week, as well as two liquor drinks twice a week. This is an increase from the amount he was drinking. I cannot say if his increase in liver enzymes are due to Truvada and/or alcohol, or another cause. I think he would benefit from follow up with his hepatologist.

## 2024-05-20 LAB
C TRACH RRNA SPEC QL NAA+PROBE: NEGATIVE
N GONORRHOEA RRNA SPEC QL NAA+PROBE: NEGATIVE
N.GONORROHEAE, AMP RNA SOURCE: NORMAL
SPECIMEN SOURCE: NORMAL

## 2024-05-22 ENCOUNTER — PATIENT MESSAGE (OUTPATIENT)
Dept: NEUROLOGY | Facility: CLINIC | Age: 39
End: 2024-05-22
Payer: COMMERCIAL

## 2024-05-22 ENCOUNTER — PATIENT MESSAGE (OUTPATIENT)
Dept: OTOLARYNGOLOGY | Facility: CLINIC | Age: 39
End: 2024-05-22
Payer: COMMERCIAL

## 2024-05-28 ENCOUNTER — PATIENT MESSAGE (OUTPATIENT)
Dept: OTOLARYNGOLOGY | Facility: CLINIC | Age: 39
End: 2024-05-28
Payer: COMMERCIAL

## 2024-05-28 ENCOUNTER — PATIENT MESSAGE (OUTPATIENT)
Dept: HEPATOLOGY | Facility: CLINIC | Age: 39
End: 2024-05-28
Payer: COMMERCIAL

## 2024-05-30 ENCOUNTER — OFFICE VISIT (OUTPATIENT)
Dept: HEPATOLOGY | Facility: CLINIC | Age: 39
End: 2024-05-30
Payer: COMMERCIAL

## 2024-05-30 VITALS — WEIGHT: 157.19 LBS | HEIGHT: 69 IN | BODY MASS INDEX: 23.28 KG/M2

## 2024-05-30 DIAGNOSIS — E78.2 MIXED HYPERLIPIDEMIA: ICD-10-CM

## 2024-05-30 DIAGNOSIS — R74.8 ELEVATED LIVER ENZYMES: Primary | ICD-10-CM

## 2024-05-30 DIAGNOSIS — Z78.9 ALCOHOL USE: ICD-10-CM

## 2024-05-30 DIAGNOSIS — K76.0 FATTY LIVER: ICD-10-CM

## 2024-05-30 PROBLEM — F10.90 ALCOHOL USE: Status: ACTIVE | Noted: 2024-05-30

## 2024-05-30 PROCEDURE — 3008F BODY MASS INDEX DOCD: CPT | Mod: CPTII,S$GLB,, | Performed by: NURSE PRACTITIONER

## 2024-05-30 PROCEDURE — 1160F RVW MEDS BY RX/DR IN RCRD: CPT | Mod: CPTII,S$GLB,, | Performed by: NURSE PRACTITIONER

## 2024-05-30 PROCEDURE — 99214 OFFICE O/P EST MOD 30 MIN: CPT | Mod: S$GLB,,, | Performed by: NURSE PRACTITIONER

## 2024-05-30 PROCEDURE — 1159F MED LIST DOCD IN RCRD: CPT | Mod: CPTII,S$GLB,, | Performed by: NURSE PRACTITIONER

## 2024-05-30 PROCEDURE — 99999 PR PBB SHADOW E&M-EST. PATIENT-LVL III: CPT | Mod: PBBFAC,,, | Performed by: NURSE PRACTITIONER

## 2024-05-30 NOTE — PROGRESS NOTES
[facility] Established Patient Visit    Reason for Visit:  Elevated liver enzymes    PCP: Lacey Han    HPI:  This is a 39 y.o. male with PMH noted below, here for follow up for elevated liver enzymes. He was last seen in clinic by myself in 11/2023.    The patient's risk factors for fatty liver disease include:     Obesity                                        No; BMI 23.21  Dyslipidemia                                Yes; Well controlled on Crestor 10 mg daily - Refer to most recent lipid panel results:   Latest Reference Range & Units 02/09/24 12:06   Cholesterol Total 120 - 199 mg/dL 159   HDL 40 - 75 mg/dL 36 (L)   HDL/Cholesterol Ratio 20.0 - 50.0 % 22.6   Non-HDL Cholesterol mg/dL 123   Total Cholesterol/HDL Ratio 2.0 - 5.0  4.4   Triglycerides 30 - 150 mg/dL 104   LDL Cholesterol 63.0 - 159.0 mg/dL 102.2     Insulin resistance/Diabetes         No; Last HgbA1c was 5.2% (11/2023)  Family history of diabetes           No    He has had elevated liver enzymes in a hepatocellular pattern since at least 4/2022. Prior LFT results in Care Everywhere were normal.     He has never undergone a liver biopsy or non-invasive staging exam.     FIB-4 Calculation: 1.35 at 11/21/2023  4:02 PM   Calculated from:  Last SGOT/AST : 90 at 5/30/2024 10:57 AM  Last SGPT/ALT: 296 at 11/21/2023  4:02 PM   Platelets: 251 at 5/30/2024 10:57 AM   Age: 39 y.o.     FIB-4 below 1.30 is considered as low-risk for advanced fibrosis  FIB-4 over 2.67 is considered as high-risk for advanced fibrosis  FIB-4 values between 1.30 and 2.67 are considered as intermediate-risk of advanced fibrosis for ages 36-64.     For ages > 64 the cut-off for low-risk goes to < 2.  This is a screening tool and clinical judgement should be used in the interpretation of these results.    CT of the abdomen in 8/2023 (performed during ED visit) showed:    FINDINGS:    Images of the lower thorax are remarkable for mild dependent atelectasis.      The liver is hypoattenuating suggesting steatosis, correlation with LFTs recommended.  The spleen, pancreas, gallbladder and adrenal glands are unremarkable.  There is no biliary dilation or ascites.  The portal vein, splenic vein, SMV, celiac axis and SMA all are patent.  No significant abdominal lymphadenopathy.     The kidneys enhance symmetrically without hydronephrosis or nephrolithiasis.  There is a low attenuating lesion arising from the lower pole of the left kidney measuring 1.4 cm, attenuation of which suggests cyst.  The bilateral ureters are unremarkable without calculi seen.  The urinary bladder is unremarkable.  The prostate is not enlarged.     The distal large bowel is for the most part decompressed.  The terminal ileum is unremarkable.  There is surgical change of appendectomy.  The small bowel is grossly unremarkable.  There are few scattered shotty periaortic, pericaval, and mesenteric lymph nodes.  No focal organized pelvic fluid collection.     There are degenerative changes of the spine.  No significant inguinal lymphadenopathy.     Impression:     1. Findings suggesting hepatic steatosis, correlation with LFTs recommended.  2. No findings to suggest obstructive uropathy.  3. Please see above for additional findings.     He has no known family history of liver or autoimmune diseases. He denies any recent history of heavy alcohol use. He typically consumes on average 4-5 alcoholic drinks per week. He does not use illicit drugs. HIV negative. 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 Due to elevated liver enzymes he stopped taking OTC sleep aid that contains Valerian root prior to last visit.     Serological work up for other causes of chronic liver disease was negative for Huang Disease, Alpha-1 antitrypsin deficiency, autoimmune liver disease, viral hepatitis, and iron overload. Ferritin level was elevated, but iron saturation was normal, and  Peth was positive at 194, so the likely cause of elevated ferritin level was fatty liver/alcohol use.     After last visit, he stopped consuming alcohol and LFT's had normalized in December 2023. He was started on Fluoxetine in 11/2023 by his PCP for anxiety and repeat LFTs in 2/2024 showed a mild elevation in ALT and AST (73/44). He was also placed on Truvada in 2/2024 for PrEP, and repeat labs this month showed a significant rise in his liver enzymes from February (, AST 90). Due to worsening liver enzymes, his PCP stopped Truvada. He is well appearing, and has no signs or symptoms of hepatic decompensation including jaundice, pruritus, abdominal distention, lower extremity edema, hematemesis, melena, or periods of confusion suggestive of hepatic encephalopathy.      PMHX:  has a past medical history of Fatty liver (11/16/2023), Food allergy (2015), GUSTABO (generalized anxiety disorder) (4/20/2022), Hearing loss (11/27/2020), Hypertension (11/8/2023), Meniere disease (12/2019), Mixed hyperlipidemia (02/18/2019), and Seasonal allergies.    PSHX:  has a past surgical history that includes Hip arthroscopy w/ labral repair; Appendectomy; and Hip surgery (3/3/2006).    The patient's social and family histories were reviewed by me and updated in the appropriate section of the electronic medical record.    Review of patient's allergies indicates:   Allergen Reactions    Hazelnut Itching, Other (See Comments) and Swelling    Pérez Itching, Other (See Comments) and Swelling     Current Outpatient Medications on File Prior to Visit   Medication Sig Dispense Refill    ALPRAZolam (XANAX) 0.25 MG tablet Take 1 tablet (0.25 mg total) by mouth nightly as needed (panic attack). 30 tablet 0    ergocalciferol (ERGOCALCIFEROL) 50,000 unit Cap Take 1 capsule (50,000 Units total) by mouth every 7 days. 12 capsule 3    FLUoxetine 20 MG capsule Take 1 capsule (20 mg total) by mouth once daily. 30 capsule 11    meclizine (ANTIVERT)  12.5 mg tablet Take 1 tablet (12.5 mg total) by mouth 2 (two) times daily as needed for Dizziness. 30 tablet 11    mometasone (NASONEX) 50 mcg/actuation nasal spray Use 2 sprays by Nasal route once daily. 17 g 11    montelukast (SINGULAIR) 10 mg tablet Take 1 tablet by mouth every evening. 30 tablet 1    rosuvastatin (CRESTOR) 10 MG tablet Take 1 tablet (10 mg total) by mouth once daily. 90 tablet 3    triamterene-hydrochlorothiazide 37.5-25 mg (DYAZIDE) 37.5-25 mg per capsule Take 1 capsule by mouth every morning. 30 capsule 11    [DISCONTINUED] emtricitabine-tenofovir 200-300 mg (TRUVADA) 200-300 mg Tab Take 1 tablet by mouth once daily. (Patient not taking: Reported on 5/30/2024) 90 tablet 0    [DISCONTINUED] famotidine (PEPCID) 40 MG tablet Take 1 tablet (40 mg total) by mouth once daily. for 14 days 14 tablet 0    [DISCONTINUED] loratadine (CLARITIN) 5 mg chewable tablet Take 5 mg by mouth once daily. (Patient not taking: Reported on 5/30/2024)       No current facility-administered medications on file prior to visit.     SOCIAL HISTORY:   Social History     Tobacco Use   Smoking Status Never   Smokeless Tobacco Never     Social History     Substance and Sexual Activity   Alcohol Use Yes    Alcohol/week: 3.0 standard drinks of alcohol    Types: 2 Cans of beer, 1 Shots of liquor per week    Comment: occas     Social History     Substance and Sexual Activity   Drug Use Never     ROS:   GENERAL: Denies fever, chills, weight loss/gain, fatigue  HEENT: Denies headaches, + dizziness and hearing deficit - History of Meniere's disease - followed by Neurology and ENT  CARDIOVASCULAR: Denies chest pain, palpitations, or edema  RESPIRATORY: Denies dyspnea, cough  GI: Denies abdominal pain, rectal bleeding, nausea, vomiting. No change in bowel pattern or color  : Denies dysuria, hematuria  SKIN: Denies rash, itching   NEURO: Denies confusion, memory loss, or mood changes  PSYCH: Denies depression or anxiety  HEME/LYMPH:  "Denies easy bruising or bleeding    Objective Findings:    PHYSICAL EXAM:   Friendly White male, in no acute distress; alert and oriented to person, place and time.  VITALS: Ht 5' 9" (1.753 m)   Wt 71.3 kg (157 lb 3 oz)   BMI 23.21 kg/m²   HENT: Normocephalic, without obvious abnormality.   EYES: Sclerae anicteric.    NECK: No obvious masses.  CARDIOVASCULAR: No peripheral edema.  RESPIRATORY: Normal respiratory effort.   GI: Non-distended abdomen.  EXTREMITIES:  No clubbing, cyanosis or edema.  SKIN: Warm and dry. No jaundice. No rashes noted to exposed skin.  NEURO:  Normal gait. No asterixis.  PSYCH:  Memory intact. Thought and speech pattern appropriate. Behavior normal. No depression or anxiety noted.    DIAGNOSTIC STUDIES:    CT Abdomen Pelvis With Contrast 8/30/2023:    FINDINGS:    Images of the lower thorax are remarkable for mild dependent atelectasis.     The liver is hypoattenuating suggesting steatosis, correlation with LFTs recommended.  The spleen, pancreas, gallbladder and adrenal glands are unremarkable.  There is no biliary dilation or ascites.  The portal vein, splenic vein, SMV, celiac axis and SMA all are patent.  No significant abdominal lymphadenopathy.     The kidneys enhance symmetrically without hydronephrosis or nephrolithiasis.  There is a low attenuating lesion arising from the lower pole of the left kidney measuring 1.4 cm, attenuation of which suggests cyst.  The bilateral ureters are unremarkable without calculi seen.  The urinary bladder is unremarkable.  The prostate is not enlarged.     The distal large bowel is for the most part decompressed.  The terminal ileum is unremarkable.  There is surgical change of appendectomy.  The small bowel is grossly unremarkable.  There are few scattered shotty periaortic, pericaval, and mesenteric lymph nodes.  No focal organized pelvic fluid collection.     There are degenerative changes of the spine.  No significant inguinal lymphadenopathy.   "   Impression:     1. Findings suggesting hepatic steatosis, correlation with LFTs recommended.  2. No findings to suggest obstructive uropathy.  3. Please see above for additional findings.     ASSESSMENT & PLAN:  39 y.o. White male with:    1. Elevated liver enzymes  Hepatic Function Panel    Ferritin    Phosphatidylethanol (PETH)    Iron and TIBC      2. Fatty liver        3. Mixed hyperlipidemia        4. Alcohol use          - Repeat liver function tests in 2 weeks. Will also recheck Iron studies and Peth.   - Continue to remain off Truvada. Okay to continue Fluoxetine.   - Avoid alcohol and herbal supplements/alternative remedies.  - If liver enzymes improved/normalized on next labs, will obtain Fibroscan to non-invasively stage liver disease.  - If liver enzymes normalized on next labs, okay to restart Truvada as well, per guidance from PCP, with recommendation for monthly lab monitoring.   - Maintain a healthy weight, through diet and exercise.  - Recommend good control of cholesterol, blood pressure, & blood sugar levels.  - Avoid alcohol and herbal supplements/alternative remedies.  - Return to clinic to be determined by lab results.    Thank you for allowing me to participate in the care of Kaden Heller       Hepatology Nurse Practitioner  [facility]    CC'ed note to:   No ref. provider found  Pretus-Lacey Ann NP

## 2024-06-10 ENCOUNTER — PATIENT MESSAGE (OUTPATIENT)
Dept: OTOLARYNGOLOGY | Facility: CLINIC | Age: 39
End: 2024-06-10
Payer: COMMERCIAL

## 2024-08-23 DIAGNOSIS — Z12.83 ENCOUNTER FOR SCREENING FOR MALIGNANT NEOPLASM OF SKIN: Primary | ICD-10-CM

## 2024-09-18 ENCOUNTER — E-VISIT (OUTPATIENT)
Dept: INTERNAL MEDICINE | Facility: CLINIC | Age: 39
End: 2024-09-18
Payer: COMMERCIAL

## 2024-09-18 ENCOUNTER — TELEPHONE (OUTPATIENT)
Dept: INTERNAL MEDICINE | Facility: CLINIC | Age: 39
End: 2024-09-18
Payer: COMMERCIAL

## 2024-09-18 DIAGNOSIS — J02.9 SORE THROAT: Primary | ICD-10-CM

## 2024-09-18 LAB
CTP QC/QA: YES
S PYO RRNA THROAT QL PROBE: NEGATIVE

## 2024-09-18 RX ORDER — AZELASTINE 1 MG/ML
1 SPRAY, METERED NASAL 2 TIMES DAILY
Qty: 30 ML | Refills: 0 | Status: SHIPPED | OUTPATIENT
Start: 2024-09-18 | End: 2025-09-18

## 2024-09-18 RX ORDER — LIDOCAINE HYDROCHLORIDE 20 MG/ML
SOLUTION OROPHARYNGEAL
Qty: 100 ML | Refills: 0 | Status: SHIPPED | OUTPATIENT
Start: 2024-09-18 | End: 2024-09-25

## 2024-09-18 RX ORDER — AMOXICILLIN AND CLAVULANATE POTASSIUM 875; 125 MG/1; MG/1
1 TABLET, FILM COATED ORAL EVERY 12 HOURS
Qty: 14 TABLET | Refills: 0 | Status: SHIPPED | OUTPATIENT
Start: 2024-09-18 | End: 2024-09-25

## 2024-09-18 NOTE — PROGRESS NOTES
Patient ID: Kaden Heller is a 39 y.o. male.    Chief Complaint: URI (Entered automatically based on patient selection in [facility].)    The patient initiated a request through [employer] on 9/18/2024 for evaluation and management with a chief complaint of URI (Entered automatically based on patient selection in [employer].)     I evaluated the questionnaire submission on 09/18/2024.    [facility] E-Visit Covid    9/18/2024 10:31 AM CDT - Filed by Patient   Do you agree to participate in an E-Visit? Yes   If you have any of the following symptoms, go to your local emergency room or call 911: I acknowledge   Choose the state of your primary residence [place]   What is the main issue you would like addressed today? Horrible sore throat down into my chest. Both sides of my nose clears up, but then gets stuffy again. Slight temp/headache.   Do you think you might have COVID or the Flu? No   Have you tested positive for COVID or Flu? No   What symptoms do you currently have?  Headache;  Nasal Congestion;  Sore throat;  Pain around the nose and face   Have you had any of the following? None of the above   Have you ever smoked? I have never smoked   Have you had a fever? Yes   What has been the range of your fever? I have not checked my temperature with a thermometer.   When did your symptoms first appear? 9/17/2024   In the last two weeks, have you been in close contact with someone who has COVID-19 or the Flu? No   List what you have done or taken to help your symptoms. Nothing as of yet.   How severe are your symptoms? Moderate   Have your symptoms gotten better or worse since they started?  Worse   Do you have transportation to get testing if it is needed and ordered for you at an [facility] location? Yes   Provide any additional information you feel is important. The sore throat is the worst. The stuffy nose was horrible when i first woke up, but it goes in and out. Has been clear for a little bit now. Slight chest pain. Probably  normal anxiety stuff, but i can now feel the pain in my throat when i swallow.   Please attach any relevant images or files    Are you able to take your vital signs? No         Encounter Diagnosis   Name Primary?    Sore throat Yes        Orders Placed This Encounter   Procedures    POCT Rapid Strep A      Medications Ordered This Encounter   Medications    amoxicillin-clavulanate 875-125mg (AUGMENTIN) 875-125 mg per tablet     Sig: Take 1 tablet by mouth every 12 (twelve) hours. for 7 days     Dispense:  14 tablet     Refill:  0    azelastine (ASTELIN) 137 mcg (0.1 %) nasal spray     Si spray (137 mcg total) by Nasal route 2 (two) times daily.     Dispense:  30 mL     Refill:  0    LIDOcaine viscous HCl 2% (LIDOCAINE VISCOUS) 2 % Soln     Sig: by Mucous Membrane route every 3 (three) hours. for 7 days     Dispense:  100 mL     Refill:  0        No follow-ups on file.      E-Visit Time Tracking:    Day 1 Time (in minutes): 25    Total Time (in minutes): 25

## 2025-01-30 ENCOUNTER — LAB VISIT (OUTPATIENT)
Dept: LAB | Facility: HOSPITAL | Age: 40
End: 2025-01-30
Payer: COMMERCIAL

## 2025-01-30 ENCOUNTER — OFFICE VISIT (OUTPATIENT)
Dept: INTERNAL MEDICINE | Facility: CLINIC | Age: 40
End: 2025-01-30
Payer: COMMERCIAL

## 2025-01-30 DIAGNOSIS — F41.0 PANIC DISORDER: ICD-10-CM

## 2025-01-30 DIAGNOSIS — E55.9 VITAMIN D DEFICIENCY: ICD-10-CM

## 2025-01-30 DIAGNOSIS — J02.9 SORE THROAT: ICD-10-CM

## 2025-01-30 DIAGNOSIS — G43.109 MIGRAINE EQUIVALENT SYNDROME: ICD-10-CM

## 2025-01-30 DIAGNOSIS — J06.9 UPPER RESPIRATORY TRACT INFECTION, UNSPECIFIED TYPE: ICD-10-CM

## 2025-01-30 DIAGNOSIS — B34.9 VIRAL ILLNESS: Primary | ICD-10-CM

## 2025-01-30 LAB
ANION GAP SERPL CALC-SCNC: 11 MMOL/L (ref 8–16)
BUN SERPL-MCNC: 15 MG/DL (ref 6–20)
CALCIUM SERPL-MCNC: 10 MG/DL (ref 8.7–10.5)
CHLORIDE SERPL-SCNC: 94 MMOL/L (ref 95–110)
CO2 SERPL-SCNC: 29 MMOL/L (ref 23–29)
CREAT SERPL-MCNC: 1.1 MG/DL (ref 0.5–1.4)
CTP QC/QA: YES
EST. GFR  (NO RACE VARIABLE): >60 ML/MIN/1.73 M^2
GLUCOSE SERPL-MCNC: 103 MG/DL (ref 70–110)
POC MOLECULAR INFLUENZA A AGN: NEGATIVE
POC MOLECULAR INFLUENZA B AGN: NEGATIVE
POTASSIUM SERPL-SCNC: 3.5 MMOL/L (ref 3.5–5.1)
S PYO RRNA THROAT QL PROBE: NEGATIVE
SARS-COV-2 RDRP RESP QL NAA+PROBE: NEGATIVE
SODIUM SERPL-SCNC: 134 MMOL/L (ref 136–145)

## 2025-01-30 PROCEDURE — 3008F BODY MASS INDEX DOCD: CPT | Mod: CPTII,S$GLB,, | Performed by: NURSE PRACTITIONER

## 2025-01-30 PROCEDURE — 3075F SYST BP GE 130 - 139MM HG: CPT | Mod: CPTII,S$GLB,, | Performed by: NURSE PRACTITIONER

## 2025-01-30 PROCEDURE — 1159F MED LIST DOCD IN RCRD: CPT | Mod: CPTII,S$GLB,, | Performed by: NURSE PRACTITIONER

## 2025-01-30 PROCEDURE — 87502 INFLUENZA DNA AMP PROBE: CPT | Mod: QW,S$GLB,, | Performed by: NURSE PRACTITIONER

## 2025-01-30 PROCEDURE — 3080F DIAST BP >= 90 MM HG: CPT | Mod: CPTII,S$GLB,, | Performed by: NURSE PRACTITIONER

## 2025-01-30 PROCEDURE — 99999 PR PBB SHADOW E&M-EST. PATIENT-LVL IV: CPT | Mod: PBBFAC,,, | Performed by: NURSE PRACTITIONER

## 2025-01-30 PROCEDURE — 80048 BASIC METABOLIC PNL TOTAL CA: CPT | Performed by: NURSE PRACTITIONER

## 2025-01-30 PROCEDURE — 82306 VITAMIN D 25 HYDROXY: CPT | Performed by: NURSE PRACTITIONER

## 2025-01-30 PROCEDURE — 36415 COLL VENOUS BLD VENIPUNCTURE: CPT | Performed by: NURSE PRACTITIONER

## 2025-01-30 PROCEDURE — 87635 SARS-COV-2 COVID-19 AMP PRB: CPT | Mod: QW,S$GLB,, | Performed by: NURSE PRACTITIONER

## 2025-01-30 PROCEDURE — 99214 OFFICE O/P EST MOD 30 MIN: CPT | Mod: S$GLB,,, | Performed by: NURSE PRACTITIONER

## 2025-01-30 PROCEDURE — 87880 STREP A ASSAY W/OPTIC: CPT | Mod: QW,S$GLB,, | Performed by: NURSE PRACTITIONER

## 2025-01-30 RX ORDER — ERGOCALCIFEROL 1.25 MG/1
50000 CAPSULE ORAL
Qty: 4 CAPSULE | Refills: 0 | Status: SHIPPED | OUTPATIENT
Start: 2025-01-30

## 2025-01-30 RX ORDER — OSELTAMIVIR PHOSPHATE 75 MG/1
75 CAPSULE ORAL 2 TIMES DAILY
Qty: 10 CAPSULE | Refills: 0 | Status: SHIPPED | OUTPATIENT
Start: 2025-01-30 | End: 2025-02-04

## 2025-01-30 RX ORDER — AZELASTINE 1 MG/ML
1 SPRAY, METERED NASAL 2 TIMES DAILY
Qty: 30 ML | Refills: 11 | OUTPATIENT
Start: 2025-01-30 | End: 2026-01-30

## 2025-01-30 RX ORDER — KETOROLAC TROMETHAMINE 30 MG/ML
30 INJECTION, SOLUTION INTRAMUSCULAR; INTRAVENOUS
Status: SHIPPED | OUTPATIENT
Start: 2025-01-30 | End: 2025-02-02

## 2025-01-30 RX ORDER — ALPRAZOLAM 0.25 MG/1
0.25 TABLET ORAL NIGHTLY PRN
Qty: 30 TABLET | Refills: 0 | Status: SHIPPED | OUTPATIENT
Start: 2025-01-30 | End: 2025-03-01

## 2025-01-30 RX ORDER — MOMETASONE FUROATE MONOHYDRATE 50 UG/1
2 SPRAY, METERED NASAL DAILY
Qty: 17 G | Refills: 11 | Status: SHIPPED | OUTPATIENT
Start: 2025-01-30

## 2025-01-30 NOTE — PATIENT INSTRUCTIONS
Dayquil and nyquil prn.   Rest and hydrate well.   Warm salt water gargles, honey/lemon, throat lozenges prn sore throat   Tylenol prn.   IBU okay for tomorrow.   Refilled your vit D for a few more weeks. You'll need to repeat a vit D at some point. I ordered it for you.

## 2025-01-30 NOTE — LETTER
January 30, 2025      [facility]  [address]  [facility] [address]  Phone: [phone]  Fax: [phone]       Patient: Kaden Heller   YOB: 1985  Date of Visit: 01/30/2025    To Whom It May Concern:    Pascual Heller  was at [facility] on 01/30/2025. The patient may return to work on 2/3/25. If you have any questions or concerns, or if I can be of further assistance, please do not hesitate to contact me.    Sincerely,     Deepti Johns NP

## 2025-01-30 NOTE — PROGRESS NOTES
INTERNAL MEDICINE PROGRESS/URGENT CARE NOTE    CHIEF COMPLAINT     Chief Complaint   Patient presents with    Sore Throat    Nasal Congestion    Fever       HPI     Kaden Heller is a 39 y.o. male who presents for an urgent visit today.    Started last night with headache, sore throat, congestion. Started with a cough today. Hx of migraine equivalent- frontal headache with photophobia.  Felt feverish.   +chills and fatigue.   No nvd.   Hasn't taken anything for symptoms.     Hx of panic DO- takes xanax prn sparingly. Almost out of them.     Hx of vit D deficiency- overdue for recheck has been on weekly vit D x 1 yr.      Patient Active Problem List   Diagnosis    Family history of cardiovascular disease    Mixed hyperlipidemia    Hypertriglyceridemia    Sensorineural hearing loss (SNHL) of right ear with unrestricted hearing of left ear    Tinnitus of right ear    Meniere's disease of right ear    Migraine equivalent syndrome    Allergic rhinitis    Nasal septal deviation    GUSTABO (generalized anxiety disorder)    Hypertension    Fatty liver    Panic disorder    Elevated LFTs    On PrEP for prevention of HIV    Elevated liver enzymes    Alcohol use        Past Medical History:  Past Medical History:   Diagnosis Date    Fatty liver 11/16/2023    Food allergy 2015    micheal and hazelnut    GUSTABO (generalized anxiety disorder) 4/20/2022    Hearing loss 11/27/2020    same time tinnitus started 24/7    Hypertension 11/8/2023    Meniere disease 12/2019    Mixed hyperlipidemia 02/18/2019    Seasonal allergies         Past Surgical History:  Past Surgical History:   Procedure Laterality Date    APPENDECTOMY      HIP ARTHROSCOPY W/ LABRAL REPAIR      HIP SURGERY  3/3/2006    arthriscopic        Allergies:  Review of patient's allergies indicates:   Allergen Reactions    Hazelnut Itching, Other (See Comments) and Swelling    Micheal Itching, Other (See Comments) and Swelling       Home Medications:    Current Outpatient Medications:      meclizine (ANTIVERT) 12.5 mg tablet, Take 1 tablet (12.5 mg total) by mouth 2 (two) times daily as needed for Dizziness., Disp: 30 tablet, Rfl: 11    montelukast (SINGULAIR) 10 mg tablet, Take 1 tablet by mouth every evening., Disp: 30 tablet, Rfl: 1    ALPRAZolam (XANAX) 0.25 MG tablet, Take 1 tablet (0.25 mg total) by mouth nightly as needed (panic attack)., Disp: 30 tablet, Rfl: 0    azelastine (ASTELIN) 137 mcg (0.1 %) nasal spray, 1 spray (137 mcg total) by Nasal route 2 (two) times daily., Disp: 30 mL, Rfl: 11    betahistine HCl (BETAHISTINE, BULK, MISC), by Misc.(Non-Drug; Combo Route) route., Disp: , Rfl:     ergocalciferol (ERGOCALCIFEROL) 50,000 unit Cap, Take 1 capsule (50,000 Units total) by mouth every 7 days., Disp: 4 capsule, Rfl: 0    FLUoxetine 20 MG capsule, Take 1 capsule (20 mg total) by mouth once daily., Disp: 30 capsule, Rfl: 11    mometasone (NASONEX) 50 mcg/actuation nasal spray, Use 2 sprays by Nasal route once daily., Disp: 17 g, Rfl: 11    oseltamivir (TAMIFLU) 75 MG capsule, Take 1 capsule (75 mg total) by mouth 2 (two) times daily. for 5 days, Disp: 10 capsule, Rfl: 0    rosuvastatin (CRESTOR) 10 MG tablet, Take 1 tablet (10 mg total) by mouth once daily., Disp: 90 tablet, Rfl: 3    triamterene-hydrochlorothiazide 37.5-25 mg (DYAZIDE) 37.5-25 mg per capsule, Take 1 capsule by mouth every morning., Disp: 30 capsule, Rfl: 11    Current Facility-Administered Medications:     ketorolac injection 30 mg, 30 mg, Intramuscular, 1 time in Clinic/HOD,      Review of Systems:  Review of Systems   Constitutional:  Positive for chills, fatigue and fever.   HENT:  Positive for congestion, postnasal drip, rhinorrhea, sinus pressure and sore throat. Negative for ear pain, mouth sores, sinus pain, sneezing and trouble swallowing.    Eyes:  Negative for discharge, redness and itching.   Respiratory:  Positive for cough. Negative for shortness of breath, wheezing and stridor.    Cardiovascular:   "Negative for chest pain.   Gastrointestinal:  Negative for abdominal pain, diarrhea, nausea and vomiting.   Musculoskeletal:  Negative for myalgias.   Neurological:  Positive for headaches. Negative for dizziness and weakness.         PHYSICAL EXAM     Vitals:    01/30/25 1440   BP: (!) 134/100   BP Location: Right arm   Patient Position: Sitting   Pulse: 96   Temp: 99.5 °F (37.5 °C)   TempSrc: Oral   SpO2: 96%   Weight: 71.6 kg (157 lb 13.6 oz)   Height: 5' 9" (1.753 m)      Body mass index is 23.31 kg/m².     Physical Exam  Vitals reviewed.   Constitutional:       Appearance: Normal appearance.   HENT:      Head: Normocephalic and atraumatic.      Right Ear: Tympanic membrane and ear canal normal.      Left Ear: Tympanic membrane and ear canal normal.      Nose: Congestion present.      Mouth/Throat:      Mouth: Mucous membranes are moist.      Pharynx: Oropharynx is clear. Uvula midline. Posterior oropharyngeal erythema present. No oropharyngeal exudate or uvula swelling.   Eyes:      Conjunctiva/sclera: Conjunctivae normal.      Pupils: Pupils are equal, round, and reactive to light.   Cardiovascular:      Rate and Rhythm: Normal rate and regular rhythm.   Pulmonary:      Effort: Pulmonary effort is normal.      Breath sounds: Normal breath sounds.   Musculoskeletal:      Cervical back: Normal range of motion and neck supple.   Lymphadenopathy:      Cervical: No cervical adenopathy.   Skin:     General: Skin is warm and dry.      Findings: No rash.   Neurological:      General: No focal deficit present.      Mental Status: He is alert.   Psychiatric:         Mood and Affect: Mood normal.         Behavior: Behavior normal.         LABS     Lab Results   Component Value Date    HGBA1C 5.2 11/15/2023     CMP  Sodium   Date Value Ref Range Status   01/30/2025 134 (L) 136 - 145 mmol/L Final     Potassium   Date Value Ref Range Status   01/30/2025 3.5 3.5 - 5.1 mmol/L Final     Chloride   Date Value Ref Range Status "   01/30/2025 94 (L) 95 - 110 mmol/L Final     CO2   Date Value Ref Range Status   01/30/2025 29 23 - 29 mmol/L Final     Glucose   Date Value Ref Range Status   01/30/2025 103 70 - 110 mg/dL Final     BUN   Date Value Ref Range Status   01/30/2025 15 6 - 20 mg/dL Final     Creatinine   Date Value Ref Range Status   01/30/2025 1.1 0.5 - 1.4 mg/dL Final     Calcium   Date Value Ref Range Status   01/30/2025 10.0 8.7 - 10.5 mg/dL Final     Total Protein   Date Value Ref Range Status   05/16/2024 7.4 6.0 - 8.4 g/dL Final     Albumin   Date Value Ref Range Status   05/16/2024 4.3 3.5 - 5.2 g/dL Final     Total Bilirubin   Date Value Ref Range Status   05/16/2024 0.6 0.1 - 1.0 mg/dL Final     Comment:     For infants and newborns, interpretation of results should be based  on gestational age, weight and in agreement with clinical  observations.    Premature Infant recommended reference ranges:  Up to 24 hours.............<8.0 mg/dL  Up to 48 hours............<12.0 mg/dL  3-5 days..................<15.0 mg/dL  6-29 days.................<15.0 mg/dL       Alkaline Phosphatase   Date Value Ref Range Status   05/16/2024 67 55 - 135 U/L Final     AST   Date Value Ref Range Status   05/16/2024 90 (H) 10 - 40 U/L Final     ALT   Date Value Ref Range Status   05/16/2024 150 (H) 10 - 44 U/L Final     Anion Gap   Date Value Ref Range Status   01/30/2025 11 8 - 16 mmol/L Final     eGFR if    Date Value Ref Range Status   04/18/2022 >60.0 >60 mL/min/1.73 m^2 Final     eGFR if non    Date Value Ref Range Status   04/18/2022 >60.0 >60 mL/min/1.73 m^2 Final     Comment:     Calculation used to obtain the estimated glomerular filtration  rate (eGFR) is the CKD-EPI equation.        Lab Results   Component Value Date    WBC 5.91 02/09/2024    HGB 15.5 02/09/2024    HCT 46.0 02/09/2024    MCV 93 02/09/2024     02/09/2024     Lab Results   Component Value Date    CHOL 159 02/09/2024    CHOL 225 (H)  11/15/2023    CHOL 167 02/02/2023     Lab Results   Component Value Date    HDL 36 (L) 02/09/2024    HDL 38 (L) 11/15/2023    HDL 37 (L) 02/02/2023     Lab Results   Component Value Date    LDLCALC 102.2 02/09/2024    LDLCALC 145.8 11/15/2023    LDLCALC 100.0 02/02/2023     Lab Results   Component Value Date    TRIG 104 02/09/2024    TRIG 206 (H) 11/15/2023    TRIG 150 02/02/2023     Lab Results   Component Value Date    CHOLHDL 22.6 02/09/2024    CHOLHDL 16.9 (L) 11/15/2023    CHOLHDL 22.2 02/02/2023     Lab Results   Component Value Date    TSH 1.556 11/15/2023       ASSESSMENT     1. Viral illness    2. Upper respiratory tract infection, unspecified type    3. Vitamin D deficiency    4. Migraine equivalent syndrome    5. Panic disorder         PLAN  Covid flu and strep all negative. He feels like he has the flu. Will go ahead and tx with tamiflu. Toradol IM here.   Okay for steroid nasal spray and dayquil/nyquil prn.   Check vit d and cmp. Will refill rx vit D.    reviewed. Xanax refilled   Rest and hydrate well with water.   F/u with pCP    1. Upper respiratory tract infection, unspecified type  - POCT COVID-19 Rapid Screening  - POCT Influenza A/B Molecular  - POCT rapid strep A  - mometasone (NASONEX) 50 mcg/actuation nasal spray; Use 2 sprays by Nasal route once daily.  Dispense: 17 g; Refill: 11    2. Vitamin D deficiency  - ergocalciferol (ERGOCALCIFEROL) 50,000 unit Cap; Take 1 capsule (50,000 Units total) by mouth every 7 days.  Dispense: 4 capsule; Refill: 0  - Vitamin D; Future  - BASIC METABOLIC PANEL; Future    3. Migraine equivalent syndrome  - ketorolac injection 30 mg    4. Panic disorder  - ALPRAZolam (XANAX) 0.25 MG tablet; Take 1 tablet (0.25 mg total) by mouth nightly as needed (panic attack).  Dispense: 30 tablet; Refill: 0    5. Viral illness  - oseltamivir (TAMIFLU) 75 MG capsule; Take 1 capsule (75 mg total) by mouth 2 (two) times daily. for 5 days  Dispense: 10 capsule; Refill: 0        Follow up with PCP      Patient's plan/treatment was discussed including medications and possible side effects. Verbalized understanding of all instructions.     This note was partly generated with [employer] voice recognition software. I apologize for any possible typographical errors.          ZAHRA Joy  Department of Internal Medicine - [facility]  01/31/2025

## 2025-01-31 VITALS
HEART RATE: 96 BPM | WEIGHT: 157.88 LBS | TEMPERATURE: 100 F | HEIGHT: 69 IN | OXYGEN SATURATION: 96 % | SYSTOLIC BLOOD PRESSURE: 140 MMHG | BODY MASS INDEX: 23.38 KG/M2 | DIASTOLIC BLOOD PRESSURE: 90 MMHG

## 2025-01-31 LAB — 25(OH)D3+25(OH)D2 SERPL-MCNC: 18 NG/ML (ref 30–96)

## 2025-08-06 ENCOUNTER — E-VISIT (OUTPATIENT)
Dept: INTERNAL MEDICINE | Facility: CLINIC | Age: 40
End: 2025-08-06
Payer: COMMERCIAL

## 2025-08-06 DIAGNOSIS — U07.1 COVID-19: Primary | ICD-10-CM

## 2025-08-06 RX ORDER — NIRMATRELVIR AND RITONAVIR 300-100 MG
KIT ORAL
Qty: 30 TABLET | Refills: 0 | Status: SHIPPED | OUTPATIENT
Start: 2025-08-06 | End: 2025-08-11

## 2025-08-06 RX ORDER — PROMETHAZINE HYDROCHLORIDE AND DEXTROMETHORPHAN HYDROBROMIDE 6.25; 15 MG/5ML; MG/5ML
5 SYRUP ORAL EVERY 4 HOURS PRN
Qty: 473 ML | Refills: 0 | Status: SHIPPED | OUTPATIENT
Start: 2025-08-06 | End: 2025-08-22

## 2025-08-06 NOTE — PROGRESS NOTES
Patient ID: Kaden Heller is a 40 y.o. male.    Chief Complaint: URI (Entered automatically based on patient selection in Method.)    The patient initiated a request through Method on 8/6/2025 for evaluation and management with a chief complaint of URI (Entered automatically based on patient selection in Method.)     I evaluated the questionnaire submission on 8/6/2025  .    [facility] P[facility] E-Visit Covid    8/6/2025 12:44 PM CDT - Filed by Patient   Do you agree to participate in an E-Visit? Yes   If you have any of the following symptoms, go to your local emergency room or call 911: I acknowledge   Choose the state of your primary residence [place]   What is the main issue you would like addressed today? Cough, sore throat, headache   Do you think you might have COVID-19 or the Flu? (COVID-19, Flu, No, Not sure) COVID-19   Have you tested positive for COVID-19 or Flu?(COVID-19, Flu, No) COVID-19   What symptoms do you have? (Chills, Cough, Diarrhea, Fatigue, Fever, Headache, Stuffy nose,  Loss of taste or smell, Muscule or body aches, Nausea, Runny nose, Sore throat, None) Chills;  Cough;  Fever;  Headache;  Muscle or body aches;  Sore throat   When did your concern begin? 8/5/2025   What have you tried to help your symptoms?(Cold medication, Cough syrup, Drank fluids (water, tea), Gargled salt water, Ibuprofen or Naproxen, Rest and sleep, Tylenol, Other) Cough syrup;  Drinking more fluids;  Rest and sleep;  Tylenol   Provide any additional information you feel is important. Temp got up to 101.7 last night before bed. It is now 101.6.   Please attach any relevant images or files    Are you able to take your vitals? Yes   Systolic Blood Pressure    Diastolic Blood Pressure    Weight:    Height:    Pluse:    Temp 101.6   Resp:    SpO2          Encounter Diagnosis   Name Primary?    COVID-19 Yes        No orders of the defined types were placed in this encounter.     Medications Ordered This Encounter   Medications     nirmatrelvir-ritonavir (PAXLOVID) 300 mg (150 mg x 2)-100 mg copackaged tablets (EUA)     Sig: Take 3 tablets by mouth 2 (two) times daily. Each dose contains 2 nirmatrelvir (pink tablets) and 1 ritonavir (white tablet). Take all 3 tablets together     Dispense:  30 tablet     Refill:  0    promethazine-dextromethorphan (PROMETHAZINE-DM) 6.25-15 mg/5 mL Syrp     Sig: Take 5 mLs by mouth every 4 (four) hours as needed (cough).     Dispense:  473 mL     Refill:  0        No follow-ups on file.      E-Visit Time Tracking:    Day 1 Time (in minutes): 6    Total Time (in minutes): 6